# Patient Record
Sex: MALE | Race: WHITE | Employment: OTHER | URBAN - METROPOLITAN AREA
[De-identification: names, ages, dates, MRNs, and addresses within clinical notes are randomized per-mention and may not be internally consistent; named-entity substitution may affect disease eponyms.]

---

## 2017-04-25 ENCOUNTER — OFFICE VISIT (OUTPATIENT)
Dept: FAMILY MEDICINE CLINIC | Age: 62
End: 2017-04-25

## 2017-04-25 VITALS
TEMPERATURE: 97 F | WEIGHT: 214 LBS | SYSTOLIC BLOOD PRESSURE: 110 MMHG | DIASTOLIC BLOOD PRESSURE: 80 MMHG | BODY MASS INDEX: 30.71 KG/M2

## 2017-04-25 DIAGNOSIS — J06.9 UPPER RESPIRATORY TRACT INFECTION, UNSPECIFIED TYPE: Primary | ICD-10-CM

## 2017-04-25 PROCEDURE — 99213 OFFICE O/P EST LOW 20 MIN: CPT | Performed by: PHYSICIAN ASSISTANT

## 2017-04-25 RX ORDER — PREDNISONE 20 MG/1
60 TABLET ORAL DAILY
Qty: 15 TABLET | Refills: 0 | Status: SHIPPED | OUTPATIENT
Start: 2017-04-25 | End: 2017-04-30

## 2017-04-25 RX ORDER — CEPHALEXIN 500 MG/1
500 CAPSULE ORAL 3 TIMES DAILY
Qty: 30 CAPSULE | Refills: 0 | Status: SHIPPED | OUTPATIENT
Start: 2017-04-25 | End: 2017-07-07 | Stop reason: ALTCHOICE

## 2017-04-25 RX ORDER — ALBUTEROL SULFATE 90 UG/1
2 AEROSOL, METERED RESPIRATORY (INHALATION) EVERY 6 HOURS PRN
Qty: 1 INHALER | Refills: 5 | Status: SHIPPED | OUTPATIENT
Start: 2017-04-25 | End: 2020-04-23 | Stop reason: SDUPTHER

## 2017-04-25 ASSESSMENT — ENCOUNTER SYMPTOMS
TROUBLE SWALLOWING: 0
NAUSEA: 0
SORE THROAT: 0
COUGH: 1
EYE PAIN: 0
RHINORRHEA: 1
SINUS PRESSURE: 1
WHEEZING: 0
VOMITING: 0
SHORTNESS OF BREATH: 1
DIARRHEA: 0

## 2017-07-07 PROBLEM — Z00.00 ANNUAL PHYSICAL EXAM: Status: ACTIVE | Noted: 2017-07-07

## 2017-07-11 ENCOUNTER — TELEPHONE (OUTPATIENT)
Dept: OTHER | Facility: CLINIC | Age: 62
End: 2017-07-11

## 2017-10-13 ENCOUNTER — TELEPHONE (OUTPATIENT)
Dept: FAMILY MEDICINE CLINIC | Age: 62
End: 2017-10-13

## 2017-10-13 NOTE — TELEPHONE ENCOUNTER
Patient would like a lab order for his potassium level to be checked and what other labs need to be done    Please call them at [phone] when it's ready to be picked up.

## 2017-10-24 ENCOUNTER — TELEPHONE (OUTPATIENT)
Dept: FAMILY MEDICINE CLINIC | Age: 62
End: 2017-10-24

## 2017-10-24 DIAGNOSIS — I10 ESSENTIAL HYPERTENSION: Primary | ICD-10-CM

## 2017-10-24 NOTE — TELEPHONE ENCOUNTER
Pt's spouse states pt went to [facility] for his back and they ran blood test and EKG and chest x-rays and everything should be one Care Everywhere; pt's spouse states she would like to know the information; stated the creatin was a little high. Does pt need to do another blood work now or wait? Currently registered to do blood work on Friday 10/27/17. Please call and advise.

## 2017-10-26 ENCOUNTER — TELEPHONE (OUTPATIENT)
Dept: FAMILY MEDICINE CLINIC | Age: 62
End: 2017-10-26

## 2017-10-26 DIAGNOSIS — N28.9 ABNORMAL KIDNEY FUNCTION: Primary | ICD-10-CM

## 2017-11-06 ENCOUNTER — TELEPHONE (OUTPATIENT)
Dept: FAMILY MEDICINE CLINIC | Age: 62
End: 2017-11-06

## 2017-11-07 ENCOUNTER — OFFICE VISIT (OUTPATIENT)
Dept: FAMILY MEDICINE CLINIC | Age: 62
End: 2017-11-07

## 2017-11-07 VITALS
HEART RATE: 63 BPM | BODY MASS INDEX: 29.92 KG/M2 | TEMPERATURE: 97.3 F | WEIGHT: 210 LBS | DIASTOLIC BLOOD PRESSURE: 90 MMHG | SYSTOLIC BLOOD PRESSURE: 130 MMHG | OXYGEN SATURATION: 99 %

## 2017-11-07 DIAGNOSIS — I10 ESSENTIAL HYPERTENSION: ICD-10-CM

## 2017-11-07 DIAGNOSIS — M54.6 ACUTE LEFT-SIDED THORACIC BACK PAIN: Primary | ICD-10-CM

## 2017-11-07 PROCEDURE — 99214 OFFICE O/P EST MOD 30 MIN: CPT | Performed by: FAMILY MEDICINE

## 2017-11-07 RX ORDER — LOVASTATIN 10 MG/1
10 TABLET ORAL NIGHTLY
COMMUNITY
End: 2017-12-26 | Stop reason: SDUPTHER

## 2017-11-07 RX ORDER — CYCLOBENZAPRINE HCL 10 MG
10 TABLET ORAL 3 TIMES DAILY PRN
Qty: 30 TABLET | Refills: 1 | Status: SHIPPED | OUTPATIENT
Start: 2017-11-07 | End: 2017-11-17

## 2017-11-07 RX ORDER — PREDNISONE 20 MG/1
TABLET ORAL
Qty: 19 TABLET | Refills: 0 | Status: SHIPPED | OUTPATIENT
Start: 2017-11-07 | End: 2017-12-06 | Stop reason: HOSPADM

## 2017-11-07 RX ORDER — AMLODIPINE BESYLATE 5 MG/1
TABLET ORAL
Qty: 30 TABLET | Refills: 0 | Status: SHIPPED
Start: 2017-11-07 | End: 2017-12-26 | Stop reason: DRUGHIGH

## 2017-11-07 ASSESSMENT — ENCOUNTER SYMPTOMS
SHORTNESS OF BREATH: 0
BACK PAIN: 1
COUGH: 0

## 2017-11-07 NOTE — PATIENT INSTRUCTIONS
Patient Education        Back Stretches: Exercises  Your Care Instructions  Here are some examples of exercises for stretching your back. Start each exercise slowly. Ease off the exercise if you start to have pain. Your doctor or physical therapist will tell you when you can start these exercises and which ones will work best for you. How to do the exercises  Overhead stretch    1. Stand comfortably with your feet shoulder-width apart. 2. Looking straight ahead, raise both arms over your head and reach toward the ceiling. Do not allow your head to tilt back. 3. Hold for 15 to 30 seconds, then lower your arms to your sides. 4. Repeat 2 to 4 times. Side stretch    1. Stand comfortably with your feet shoulder-width apart. 2. Raise one arm over your head, and then lean to the other side. 3. Slide your hand down your leg as you let the weight of your arm gently stretch your side muscles. Hold for 15 to 30 seconds. 4. Repeat 2 to 4 times on each side. Press-up    1. Lie on your stomach, supporting your body with your forearms. 2. Press your elbows down into the floor to raise your upper back. As you do this, relax your stomach muscles and allow your back to arch without using your back muscles. As your press up, do not let your hips or pelvis come off the floor. 3. Hold for 15 to 30 seconds, then relax. 4. Repeat 2 to 4 times. Relax and rest    1. Lie on your back with a rolled towel under your neck and a pillow under your knees. Extend your arms comfortably to your sides. 2. Relax and breathe normally. 3. Remain in this position for about 10 minutes. 4. If you can, do this 2 or 3 times each day. Follow-up care is a key part of your treatment and safety. Be sure to make and go to all appointments, and call your doctor if you are having problems. It's also a good idea to know your test results and keep a list of the medicines you take. Where can you learn more?   Go to [email or web address] and sign in to your Screenleap account. Enter W189 in the [employer] International[employer] box to learn more about \"Back Stretches: Exercises. \"     If you do not have an account, please click on the \"Sign Up Now\" link. Current as of: March 21, 2017  Content Version: 11.3  © 8745-6652 [employer]. Care instructions adapted under license by [facility] ([facility]). If you have questions about a medical condition or this instruction, always ask your healthcare professional. [address] any warranty or liability for your use of this information. Patient Education        Rhomboid Muscle Strain: Rehab Exercises  Your Care Instructions  Here are some examples of typical rehabilitation exercises for your condition. Start each exercise slowly. Ease off the exercise if you start to have pain. Your doctor or physical therapist will tell you when you can start these exercises and which ones will work best for you. How to do the exercises  Lower neck and upper back (rhomboid) stretch    5. Stretch your arms out in front of your body. Clasp one hand on top of your other hand. 6. Gently reach out so that you feel your shoulder blades stretching away from each other. 7. Gently bend your head forward. 8. Hold for 15 to 30 seconds. 9. Repeat 2 to 4 times. Resisted rows    Note: For this exercise, you will need elastic exercise material, such as surgical tubing or Thera-Band. 5. Put the band around a solid object, such as a bedpost, at about waist level. Stand facing where you have placed the band. Hold equal lengths of the band in each hand. 6. Start with your arms held out in front of you. 7. Pull the bands back, and move your shoulder blades together. As you finish, your elbows should be at your side and bent at 90 degrees (like the angle of the letter \"L\"). 8. Return to the starting position. 9. Repeat 8 to 12 times. Neck stretches    5.  Look straight ahead, and tip your right ear to your right shoulder. Do not let your left shoulder rise as you tip your head to the right. 6. Hold for 15 to 30 seconds. 7. Tilt your head to the left. Do not let your right shoulder rise as you tip your head to the left. 8. Hold for 15 to 30 seconds. 9. Repeat 2 to 4 times to each side. Neck rotation    5. Sit in a firm chair, or stand up straight. 6. Keeping your chin level, turn your head to the right, and hold for 15 to 30 seconds. 7. Turn your head to the left, and hold for 15 to 30 seconds. 8. Repeat 2 to 4 times to each side. Follow-up care is a key part of your treatment and safety. Be sure to make and go to all appointments, and call your doctor if you are having problems. It's also a good idea to know your test results and keep a list of the medicines you take. Where can you learn more? Go to https://[email or web address] [email or web address] [email or web address]. org and sign in to your G[employer]ight account. Enter G071 in the ensembli box to learn more about \"Rhomboid Muscle Strain: Rehab Exercises. \"     If you do not have an account, please click on the \"Sign Up Now\" link. Current as of: March 21, 2017  Content Version: 11.3  © 7510-3103 [employer]. Care instructions adapted under license by [facility] ([facility]). If you have questions about a medical condition or this instruction, always ask your healthcare professional. [place] [address] any warranty or liability for your use of this information.

## 2017-11-13 ENCOUNTER — OFFICE VISIT (OUTPATIENT)
Dept: ORTHOPEDIC SURGERY | Age: 62
End: 2017-11-13

## 2017-11-13 VITALS — BODY MASS INDEX: 30.08 KG/M2 | WEIGHT: 210.1 LBS | HEIGHT: 70 IN

## 2017-11-13 DIAGNOSIS — M47.814 SPONDYLOSIS OF THORACIC REGION WITHOUT MYELOPATHY OR RADICULOPATHY: Primary | ICD-10-CM

## 2017-11-13 DIAGNOSIS — M48.10 DISH (DIFFUSE IDIOPATHIC SKELETAL HYPEROSTOSIS): ICD-10-CM

## 2017-11-13 DIAGNOSIS — M54.6 ACUTE BILATERAL THORACIC BACK PAIN: ICD-10-CM

## 2017-11-13 PROCEDURE — 72070 X-RAY EXAM THORAC SPINE 2VWS: CPT | Performed by: PHYSICAL MEDICINE & REHABILITATION

## 2017-11-13 PROCEDURE — 99243 OFF/OP CNSLTJ NEW/EST LOW 30: CPT | Performed by: PHYSICAL MEDICINE & REHABILITATION

## 2017-11-13 NOTE — LETTER
Please schedule the following with:     Date:       Account: [de-identified]  Patient: Gabi Smith    : 1955  Address:  [place] [address]    Phone (H):  [phone] (home) [phone] (work)     ----------------------------------------------------------------------------------------------  Diagnosis:     ICD-10-CM ICD-9-CM    1. Spondylosis of thoracic region without myelopathy or radiculopathy M47.814 721.2    2. DISH (diffuse idiopathic skeletal hyperostosis) M48.10 721.6    3. Acute bilateral thoracic back pain M54.6 724.1 XR THORACIC SPINE (2 VIEWS)         Levels: T6/T7, T7/T8  on the left  L-Facet    ----------------------------------------------------------------------------------------------  Injection #   [id number] [facility]    Attending Physician       Saad Valenzuela.  Kirill Trivedi MD.      ----------------------------------------------------------------------------------------------  Injection Scheduled For:    At:    1st Insurance:     Pre-Cert#    2nd Insurance:    Pre-Cert#    Comments:    · Infection control  · Tested positive for MRSA in past 12 months:  no  · Tested positive for MSSA \"staph infection\" in past 12 months: no  · Tested positive for VRE (Vancomycin Resistant Enterococci) in past 12 months:   no  · Currently on any antibiotics for an infection: no  · Anticoagulants:  · On a blood thinner:  no   · Any history of bleeding disorder: no   · Advanced Liver disease: no   · Advanced Renal disease: no   · Glaucoma: no   · Diabetes: no     Sedation:  Yes  -----------------------------------------------------------------------------------------------  Allergies   Allergen Reactions    Diovan [Valsartan]     Ezetimibe-Simvastatin     Biaxin [Clarithromycin] Rash     itching

## 2017-11-16 ENCOUNTER — TELEPHONE (OUTPATIENT)
Dept: ORTHOPEDIC SURGERY | Age: 62
End: 2017-11-16

## 2017-11-16 NOTE — TELEPHONE ENCOUNTER
CPT: 87496, [address] [facility] DX: [id number] [id number]   Outpatient  SX Location: [facility] Procedure:  Facet  Auth: NPR  Date: 12-6-17  Insurance: [address]  Physician: PRIYA

## 2017-12-06 ENCOUNTER — HOSPITAL ENCOUNTER (OUTPATIENT)
Dept: PAIN MANAGEMENT | Age: 62
Discharge: HOME | End: 2017-12-06
Attending: PHYSICAL MEDICINE & REHABILITATION | Admitting: PHYSICAL MEDICINE & REHABILITATION

## 2017-12-06 VITALS
HEART RATE: 66 BPM | RESPIRATION RATE: 16 BRPM | WEIGHT: 210 LBS | DIASTOLIC BLOOD PRESSURE: 84 MMHG | BODY MASS INDEX: 30.06 KG/M2 | OXYGEN SATURATION: 100 % | HEIGHT: 70 IN | TEMPERATURE: 97.2 F | SYSTOLIC BLOOD PRESSURE: 190 MMHG

## 2017-12-06 ASSESSMENT — PAIN - FUNCTIONAL ASSESSMENT
PAIN_FUNCTIONAL_ASSESSMENT: 0-10
PAIN_FUNCTIONAL_ASSESSMENT: 0-10

## 2017-12-06 ASSESSMENT — PAIN DESCRIPTION - DESCRIPTORS: DESCRIPTORS: ACHING

## 2017-12-06 NOTE — PROGRESS NOTES
IV discontinued, catheter intact, and dressing applied. Procedural dressing dry and intact. Bilateral lower extremities equal in strength. Discharge instructions reviewed with patient or responsible adult, signed and copy given. All home medications have been reviewed. All questions answered and patient or responsible adult verbalized understanding.

## 2017-12-06 NOTE — PROGRESS NOTES
Dr. Sonia Lares intra-procedure note:        Sight marked/confirmed with x-ray: yes  Position: prone  Prepped with: chloraprep    Local : lidocaine 1%       Betamethasone:  6 mg /ml :2.5 ml  Saline (ml) 4ml      Monitored by Chioma Delcid RN  C-arm : Ismael AGUIRRE  Ciruculator: Sang Galvan RN  Prepped by: Dr. Radha Tong MD

## 2017-12-26 PROBLEM — R39.11 URINARY HESITANCY DUE TO BENIGN PROSTATIC HYPERPLASIA: Status: ACTIVE | Noted: 2017-12-26

## 2017-12-26 PROBLEM — N40.1 URINARY HESITANCY DUE TO BENIGN PROSTATIC HYPERPLASIA: Status: ACTIVE | Noted: 2017-12-26

## 2018-01-03 ENCOUNTER — TELEPHONE (OUTPATIENT)
Dept: FAMILY MEDICINE CLINIC | Age: 63
End: 2018-01-03

## 2018-01-03 RX ORDER — AMOXICILLIN 500 MG/1
1 TABLET, FILM COATED ORAL 3 TIMES DAILY
Qty: 30 TABLET | Refills: 0 | Status: SHIPPED | OUTPATIENT
Start: 2018-01-03 | End: 2018-01-13

## 2018-01-17 ENCOUNTER — OFFICE VISIT (OUTPATIENT)
Dept: ORTHOPEDIC SURGERY | Age: 63
End: 2018-01-17

## 2018-01-17 DIAGNOSIS — M25.561 RIGHT KNEE PAIN, UNSPECIFIED CHRONICITY: ICD-10-CM

## 2018-01-17 DIAGNOSIS — M21.161 ACQUIRED GENU VARUM OF RIGHT LOWER EXTREMITY: ICD-10-CM

## 2018-01-17 DIAGNOSIS — M17.11 PRIMARY OSTEOARTHRITIS OF RIGHT KNEE: Primary | ICD-10-CM

## 2018-01-17 PROCEDURE — 73562 X-RAY EXAM OF KNEE 3: CPT | Performed by: INTERNAL MEDICINE

## 2018-01-17 PROCEDURE — 99214 OFFICE O/P EST MOD 30 MIN: CPT | Performed by: INTERNAL MEDICINE

## 2018-01-17 RX ORDER — TRAMADOL HYDROCHLORIDE 50 MG/1
50 TABLET ORAL EVERY 6 HOURS PRN
Qty: 28 TABLET | Refills: 0 | Status: SHIPPED | OUTPATIENT
Start: 2018-01-17 | End: 2018-01-24

## 2018-01-24 ENCOUNTER — OFFICE VISIT (OUTPATIENT)
Dept: ORTHOPEDIC SURGERY | Age: 63
End: 2018-01-24

## 2018-01-24 DIAGNOSIS — M17.11 OSTEOARTHRITIS OF RIGHT KNEE, UNSPECIFIED OSTEOARTHRITIS TYPE: ICD-10-CM

## 2018-01-24 DIAGNOSIS — M21.161 ACQUIRED GENU VARUM OF RIGHT LOWER EXTREMITY: ICD-10-CM

## 2018-01-24 DIAGNOSIS — M17.11 PRIMARY OSTEOARTHRITIS OF RIGHT KNEE: Primary | ICD-10-CM

## 2018-01-24 PROCEDURE — 99213 OFFICE O/P EST LOW 20 MIN: CPT | Performed by: INTERNAL MEDICINE

## 2018-01-24 PROCEDURE — 20611 DRAIN/INJ JOINT/BURSA W/US: CPT | Performed by: INTERNAL MEDICINE

## 2018-01-24 NOTE — LETTER
[facility]  [address] [address]  Phone: [phone]  Fax: [phone]    Gertrude Wade MD        January 24, 2018     Patient: Baron Oropeza   YOB: 1955   Date of Visit: 1/24/2018       To Whom It May Concern: It is my medical opinion that Arianna Ceballos Should avoid excessive walking activities much as possible to promote healing of his knee condition for the next 2 weeks especially. If you have any questions or concerns, please don't hesitate to call.     Sincerely,    Mago Méndez MD.      Gertrude Wade MD

## 2018-01-25 ENCOUNTER — TELEPHONE (OUTPATIENT)
Dept: ORTHOPEDIC SURGERY | Age: 63
End: 2018-01-25

## 2018-01-25 PROBLEM — M21.161 ACQUIRED GENU VARUM OF RIGHT LOWER EXTREMITY: Status: ACTIVE | Noted: 2018-01-25

## 2018-01-25 PROBLEM — M17.11 PRIMARY OSTEOARTHRITIS OF RIGHT KNEE: Status: ACTIVE | Noted: 2018-01-25

## 2018-01-25 NOTE — TELEPHONE ENCOUNTER
LM that I did submit the brace for precert and will call him back once I have that info. Told him to give me a call if he had any questions in the mean time.

## 2018-01-25 NOTE — PROGRESS NOTES
1/24/2019       Logic E Ultrasound / linear transducer 10 HZ    The patient was placed supine on the examination table with the knees supported. The  RT      Lower extremity was slightly abducted . The ultrasound was placed on knee preset function and the linear transducer was placed transversely  over the medial patellofemoral joint and the medial patella femoral  joint recess was identified. The skin was prepped in sterile fashion. Sterile ultrasound gel  and topical anesthetic were utilized. Using axial technique, a 25 GA 40 mm needle was advanced under direct guidance into the medial patellofemoral joint recess and 4ml of 0.5% marcaine and 2 ml of celestone was injected . The joint space was visualized distending with Injectate. There was no resistance to the Injectate. Patient tolerated this with minimal to no discomfort. Band-Aid applied to puncture wound. Technically successful ultrasound guided injection. The media patellafemoral joint recess was visualized in short axis. No evidence of effusion, soft tissue mass or cystic lesions. Other Outside Imaging and Testing Personally Reviewed:       none          Assessment   Impression: . Encounter Diagnoses   Name Primary?     Primary osteoarthritis of right knee Yes    Acquired genu varum of right lower extremity     Osteoarthritis of right knee, unspecified osteoarthritis type               Plan:     Postinjection protocol  Activity modification-formal restrictions in the workplace to avoid excess walking for the next 2 weeks especially  OA  brace  Consider him a candidate for biologic orthopedic injection-type III PRP injection and/or  BMAC  Continue NSAIDs for now which a precaution  Quad isometrics and knee range of motion and consider formal course of PT eventually     Orders:        Orders Placed This Encounter   Procedures    US Guided Needle Placement    WA BETAMETHASONE ACET&SOD PHOSP    WA ARTHROCENTESIS ASPIR&/INJ

## 2018-01-29 ENCOUNTER — TELEPHONE (OUTPATIENT)
Dept: ORTHOPEDIC SURGERY | Age: 63
End: 2018-01-29

## 2018-01-30 NOTE — TELEPHONE ENCOUNTER
LM to confirm appt for measuring today at 4:30 at [facility]

## 2018-02-22 ENCOUNTER — TELEPHONE (OUTPATIENT)
Dept: ORTHOPEDIC SURGERY | Age: 63
End: 2018-02-22

## 2018-02-26 ENCOUNTER — TELEPHONE (OUTPATIENT)
Dept: ORTHOPEDIC SURGERY | Age: 63
End: 2018-02-26

## 2018-02-28 DIAGNOSIS — M17.11 OSTEOARTHRITIS OF RIGHT KNEE, UNSPECIFIED OSTEOARTHRITIS TYPE: ICD-10-CM

## 2018-02-28 PROCEDURE — L1845 KO DOUBLE UPRIGHT PRE CST: HCPCS | Performed by: INTERNAL MEDICINE

## 2018-03-16 ENCOUNTER — TELEPHONE (OUTPATIENT)
Dept: ORTHOPEDIC SURGERY | Age: 63
End: 2018-03-16

## 2018-04-02 NOTE — PROGRESS NOTES
Results for the following test have been finalized and entered into the patients chart
effusion      Palpation:  Tenderness over the medial femoral condyle greater than tibial plateau and MJL      Range of Motion:  0/120 subjective tightness and pain and range flexion greater than extension      Strength:  Normal with SLR      Special Tests:  Grade 1 pseudo-varus instability, Lachman test negative knee stable to valgus stressing, anterior drawer posterior drawer negative medial Severo's  For pain, lateral Severo's negative      Skin: There are no rashes, ulcerations or lesions. Gait: mildly antalgic with mild varus thrust      Reflex intact lower     Additional Comments:        Additional Examinations:           Left Lower Extremity: Examination of the left lower extremity does not show any tenderness, deformity or injury. Range of motion is unremarkable. There is no gross instability. There are no rashes, ulcerations or lesions. Strength and tone are normal.   Neurolgic -Light touch sensation and manual muscle testing normal L2-S1. No fasiculations. Pattella tendon and Achilles tendon reflexes +2 bilaterally.   Seated SLR negative          Office Imaging Results/Procedures PerformedToday:      Radiology:      X-rays obtained and reviewed in office:   Views 3 views right knee comparison merchant/lateral left knee   Location right knee   Impression there is suggestive of medial compartment space narrowing affecting the right knee consistent with grade 3 osteoarthritis grade 1 degenerative change affecting the lateral and anterior compartments on the right only grade 1 degenerative change tricompartmental affecting the contralateral left knee traction spurring from the superior pole of patella       Office Procedures:     Orders Placed This Encounter   Procedures    XR KNEE RIGHT (3 VIEWS)     [id number]     Order Specific Question:   Reason for exam:     Answer:   Pain    XR KNEE LEFT (1-2 VIEWS)     [id number]     Order Specific Question:   Reason for exam:     Answer:   Pain           Other

## 2018-07-03 ENCOUNTER — OFFICE VISIT (OUTPATIENT)
Dept: ORTHOPEDIC SURGERY | Age: 63
End: 2018-07-03

## 2018-07-03 VITALS
DIASTOLIC BLOOD PRESSURE: 74 MMHG | BODY MASS INDEX: 30.78 KG/M2 | HEART RATE: 71 BPM | SYSTOLIC BLOOD PRESSURE: 166 MMHG | HEIGHT: 70 IN | WEIGHT: 215 LBS

## 2018-07-03 DIAGNOSIS — M25.521 RIGHT ELBOW PAIN: ICD-10-CM

## 2018-07-03 DIAGNOSIS — M77.11 LATERAL EPICONDYLITIS OF RIGHT ELBOW: Primary | ICD-10-CM

## 2018-07-03 PROCEDURE — 99213 OFFICE O/P EST LOW 20 MIN: CPT | Performed by: ORTHOPAEDIC SURGERY

## 2018-07-03 NOTE — PROGRESS NOTES
Chief Complaint   Patient presents with    Pain     right elbow         HISTORY OF PRESENT ILLNESS:  Umang Dumont is a  right-hand-dominant patient, self-employed  here for a pain over his right lateral elbow that began approximately 3 weeks ago. Symptoms have been intermittent. The pain is aggravated by grasping and lifting and relieved by rest. He has noted no out of proportion swelling, weakness, erythema, or other signs of inflammation. Symptoms are worsening over time. Previous treatment: Topical ointment, lateral forearm offloading strap with minimal benefit. He also has a history of frequent oral anti-inflammatory use but recently stopped several months ago with concern for gastric upset. No history of cervical spine pathology, no gout or rheumatoid arthritis history    He denies any specific recent injury or event leading to the onset of symptoms. No numbness or tingling or other symptoms in his right upper extremity including hand. He does have some occasional irritation he reports on the medial part of his elbow and tingling in his medial elbow. he does have a significant medical history of right forearm fracture with multiple surgeries including operative stabilization and subsequent hardware removal. He has had forearm contracture and elbow stiffness subsequently, injury was approximately 30 years ago  Medical History:  Patient's medications, allergies, past medical, surgical, social and family histories were reviewed and updated as appropriate. ROS:  Pertinent items are noted in HPI  Review of systems reviewed from Patient History Form dated on 7/3/18 and available in the patient's chart under the Media tab. PHYSICAL EXAMINATION:    Gen/Psych: Examination reveals a pleasant individual in no acute distress. The patient is oriented to time, place and person. The patient's mood and affect are appropriate. Lymph:  The lymphatic examination bilaterally reveals all areas

## 2018-09-26 PROBLEM — Z00.00 ANNUAL PHYSICAL EXAM: Status: RESOLVED | Noted: 2017-07-07 | Resolved: 2018-09-26

## 2018-12-17 ENCOUNTER — TELEPHONE (OUTPATIENT)
Dept: FAMILY MEDICINE CLINIC | Age: 63
End: 2018-12-17

## 2018-12-17 RX ORDER — LEVOTHYROXINE SODIUM 0.05 MG/1
TABLET ORAL
Qty: 90 TABLET | Refills: 0 | Status: SHIPPED | OUTPATIENT
Start: 2018-12-17 | End: 2019-01-25 | Stop reason: SDUPTHER

## 2018-12-17 RX ORDER — AMLODIPINE BESYLATE 10 MG/1
TABLET ORAL
Qty: 90 TABLET | Refills: 0 | Status: SHIPPED | OUTPATIENT
Start: 2018-12-17 | End: 2019-01-25 | Stop reason: SDUPTHER

## 2018-12-17 RX ORDER — LOVASTATIN 10 MG/1
TABLET ORAL
Qty: 90 TABLET | Refills: 0 | Status: SHIPPED | OUTPATIENT
Start: 2018-12-17 | End: 2019-01-25 | Stop reason: SDUPTHER

## 2018-12-17 NOTE — TELEPHONE ENCOUNTER
Pt is requesting refill on meds amlodipine, lovastatin, and  Levothyroxine. Pt stated these meds are [facility] filled [facility] and not [facility].  Please Advise

## 2018-12-17 NOTE — TELEPHONE ENCOUNTER
Yakelin Narvaez for this.  Pt was las seen on 12/17by Carmel Melendez and has appt on 01/25/18 with

## 2019-01-25 ENCOUNTER — OFFICE VISIT (OUTPATIENT)
Dept: FAMILY MEDICINE CLINIC | Age: 64
End: 2019-01-25
Payer: COMMERCIAL

## 2019-01-25 VITALS
DIASTOLIC BLOOD PRESSURE: 70 MMHG | SYSTOLIC BLOOD PRESSURE: 140 MMHG | RESPIRATION RATE: 16 BRPM | BODY MASS INDEX: 30.85 KG/M2 | HEART RATE: 68 BPM | OXYGEN SATURATION: 96 % | WEIGHT: 215 LBS

## 2019-01-25 DIAGNOSIS — E78.2 MIXED HYPERLIPIDEMIA: ICD-10-CM

## 2019-01-25 DIAGNOSIS — I10 ESSENTIAL HYPERTENSION: ICD-10-CM

## 2019-01-25 DIAGNOSIS — J20.9 ACUTE BRONCHITIS, UNSPECIFIED ORGANISM: ICD-10-CM

## 2019-01-25 DIAGNOSIS — M15.9 GENERALIZED OSTEOARTHROSIS, INVOLVING MULTIPLE SITES: ICD-10-CM

## 2019-01-25 DIAGNOSIS — J43.9 PULMONARY EMPHYSEMA, UNSPECIFIED EMPHYSEMA TYPE (HCC): ICD-10-CM

## 2019-01-25 DIAGNOSIS — Z00.00 WELL ADULT EXAM: Primary | ICD-10-CM

## 2019-01-25 PROBLEM — M21.161 ACQUIRED GENU VARUM OF RIGHT LOWER EXTREMITY: Status: RESOLVED | Noted: 2018-01-25 | Resolved: 2019-01-25

## 2019-01-25 PROBLEM — M77.11 LATERAL EPICONDYLITIS OF RIGHT ELBOW: Status: RESOLVED | Noted: 2018-07-03 | Resolved: 2019-01-25

## 2019-01-25 PROCEDURE — 99396 PREV VISIT EST AGE 40-64: CPT | Performed by: FAMILY MEDICINE

## 2019-01-25 RX ORDER — LEVOTHYROXINE SODIUM 0.05 MG/1
TABLET ORAL
Qty: 90 TABLET | Refills: 3 | Status: SHIPPED | OUTPATIENT
Start: 2019-01-25 | End: 2020-03-12 | Stop reason: SDUPTHER

## 2019-01-25 RX ORDER — PREDNISONE 20 MG/1
TABLET ORAL
Qty: 15 TABLET | Refills: 0 | Status: SHIPPED | OUTPATIENT
Start: 2019-01-25 | End: 2020-06-22

## 2019-01-25 RX ORDER — AMLODIPINE BESYLATE 10 MG/1
TABLET ORAL
Qty: 90 TABLET | Refills: 3 | Status: SHIPPED | OUTPATIENT
Start: 2019-01-25 | End: 2020-03-12 | Stop reason: SDUPTHER

## 2019-01-25 RX ORDER — LOVASTATIN 10 MG/1
TABLET ORAL
Qty: 90 TABLET | Refills: 3 | Status: SHIPPED | OUTPATIENT
Start: 2019-01-25 | End: 2020-03-12 | Stop reason: SDUPTHER

## 2019-01-25 RX ORDER — CEPHALEXIN 500 MG/1
500 CAPSULE ORAL 3 TIMES DAILY
Qty: 21 CAPSULE | Refills: 0 | Status: SHIPPED | OUTPATIENT
Start: 2019-01-25 | End: 2019-02-01

## 2019-01-25 RX ORDER — DICLOFENAC SODIUM 75 MG/1
TABLET, DELAYED RELEASE ORAL
Qty: 180 TABLET | Refills: 3 | Status: SHIPPED | OUTPATIENT
Start: 2019-01-25 | End: 2020-01-08

## 2019-01-25 ASSESSMENT — PATIENT HEALTH QUESTIONNAIRE - PHQ9
2. FEELING DOWN, DEPRESSED OR HOPELESS: 0
SUM OF ALL RESPONSES TO PHQ QUESTIONS 1-9: 0
SUM OF ALL RESPONSES TO PHQ9 QUESTIONS 1 & 2: 0
SUM OF ALL RESPONSES TO PHQ QUESTIONS 1-9: 0
1. LITTLE INTEREST OR PLEASURE IN DOING THINGS: 0
1. LITTLE INTEREST OR PLEASURE IN DOING THINGS: 0
SUM OF ALL RESPONSES TO PHQ QUESTIONS 1-9: 0
SUM OF ALL RESPONSES TO PHQ QUESTIONS 1-9: 0

## 2019-04-26 RX ORDER — FLUTICASONE PROPIONATE 0.05 %
CREAM (GRAM) TOPICAL
Qty: 60 G | Refills: 3 | Status: SHIPPED | OUTPATIENT
Start: 2019-04-26 | End: 2022-10-17 | Stop reason: SDUPTHER

## 2020-01-08 RX ORDER — DICLOFENAC SODIUM 75 MG/1
TABLET, DELAYED RELEASE ORAL
Qty: 180 TABLET | Refills: 0 | Status: SHIPPED | OUTPATIENT
Start: 2020-01-08 | End: 2020-04-08

## 2020-03-12 RX ORDER — AMLODIPINE BESYLATE 10 MG/1
TABLET ORAL
Qty: 90 TABLET | Refills: 0 | Status: SHIPPED | OUTPATIENT
Start: 2020-03-12 | End: 2020-04-23 | Stop reason: SDUPTHER

## 2020-03-12 RX ORDER — LEVOTHYROXINE SODIUM 0.05 MG/1
TABLET ORAL
Qty: 90 TABLET | Refills: 0 | Status: SHIPPED | OUTPATIENT
Start: 2020-03-12 | End: 2020-04-23 | Stop reason: SDUPTHER

## 2020-03-12 RX ORDER — LOVASTATIN 10 MG/1
TABLET ORAL
Qty: 90 TABLET | Refills: 0 | Status: SHIPPED | OUTPATIENT
Start: 2020-03-12 | End: 2020-04-23 | Stop reason: SDUPTHER

## 2020-03-12 NOTE — TELEPHONE ENCOUNTER
Disp Refills Start End    levothyroxine (SYNTHROID) 50 MCG tablet 90 tablet 3 1/25/2019     Sig: TAKE 1 TABLET DAILY         Disp Refills Start End    amLODIPine (NORVASC) 10 MG tablet 90 tablet 3 1/25/2019     Sig: TAKE 1 TABLET DAILY       Disp Refills Start End    lovastatin (MEVACOR) 10 MG tablet 90 tablet 3 1/25/2019     Sig: TAKE 1 TABLET NIGHTLY      Pharmacy:  [facility] , [address] [phone] [facility] [phone]      Patient has appt: 4/27/2020 for annual physical      Please advise

## 2020-04-08 RX ORDER — DICLOFENAC SODIUM 75 MG/1
TABLET, DELAYED RELEASE ORAL
Qty: 180 TABLET | Refills: 3 | Status: SHIPPED | OUTPATIENT
Start: 2020-04-08 | End: 2020-06-22

## 2020-04-16 ENCOUNTER — TELEPHONE (OUTPATIENT)
Dept: FAMILY MEDICINE CLINIC | Age: 65
End: 2020-04-16

## 2020-04-16 NOTE — TELEPHONE ENCOUNTER
Ptcalling back from yesterday they do not need albuterol sent to [facility] right now. cx refill fornow.

## 2020-04-23 ENCOUNTER — TELEPHONE (OUTPATIENT)
Dept: FAMILY MEDICINE CLINIC | Age: 65
End: 2020-04-23

## 2020-04-23 RX ORDER — AMLODIPINE BESYLATE 10 MG/1
TABLET ORAL
Qty: 90 TABLET | Refills: 0 | Status: SHIPPED | OUTPATIENT
Start: 2020-04-23 | End: 2020-06-22

## 2020-04-23 RX ORDER — LOVASTATIN 10 MG/1
TABLET ORAL
Qty: 90 TABLET | Refills: 0 | Status: SHIPPED | OUTPATIENT
Start: 2020-04-23 | End: 2020-07-27 | Stop reason: SDUPTHER

## 2020-04-23 RX ORDER — LEVOTHYROXINE SODIUM 0.05 MG/1
TABLET ORAL
Qty: 90 TABLET | Refills: 0 | Status: SHIPPED | OUTPATIENT
Start: 2020-04-23 | End: 2020-07-27 | Stop reason: SDUPTHER

## 2020-04-23 RX ORDER — ALBUTEROL SULFATE 90 UG/1
2 AEROSOL, METERED RESPIRATORY (INHALATION) EVERY 6 HOURS PRN
Qty: 1 INHALER | Refills: 1 | Status: SHIPPED | OUTPATIENT
Start: 2020-04-23 | End: 2021-08-24 | Stop reason: SDUPTHER

## 2020-05-01 ENCOUNTER — TELEPHONE (OUTPATIENT)
Dept: FAMILY MEDICINE CLINIC | Age: 65
End: 2020-05-01

## 2020-05-30 ENCOUNTER — TELEPHONE (OUTPATIENT)
Dept: FAMILY MEDICINE CLINIC | Age: 65
End: 2020-05-30

## 2020-06-05 ENCOUNTER — TELEPHONE (OUTPATIENT)
Dept: FAMILY MEDICINE CLINIC | Age: 65
End: 2020-06-05

## 2020-06-16 ENCOUNTER — TELEPHONE (OUTPATIENT)
Dept: FAMILY MEDICINE CLINIC | Age: 65
End: 2020-06-16

## 2020-06-16 NOTE — TELEPHONE ENCOUNTER
Wife called to inform , patient has been hospitalized at [facility] due BP over 200 since 6/14 and should possibly be d/c today. Also, on 6/5 patient was seen by Mark Pulido and they did some blood work, she requests for someone to get the office notes from them to put in patient's chart. Mark Pulido phn # [phone]      Advised wife, patient's appt on 6/22 may be changed from physical to hospital f/u.       Please advise

## 2020-06-17 ENCOUNTER — TELEPHONE (OUTPATIENT)
Dept: FAMILY MEDICINE CLINIC | Age: 65
End: 2020-06-17

## 2020-06-22 ENCOUNTER — OFFICE VISIT (OUTPATIENT)
Dept: FAMILY MEDICINE CLINIC | Age: 65
End: 2020-06-22
Payer: COMMERCIAL

## 2020-06-22 VITALS
TEMPERATURE: 98.8 F | SYSTOLIC BLOOD PRESSURE: 140 MMHG | OXYGEN SATURATION: 98 % | BODY MASS INDEX: 29.63 KG/M2 | DIASTOLIC BLOOD PRESSURE: 70 MMHG | RESPIRATION RATE: 16 BRPM | HEART RATE: 73 BPM | WEIGHT: 206.5 LBS

## 2020-06-22 PROCEDURE — 99214 OFFICE O/P EST MOD 30 MIN: CPT | Performed by: FAMILY MEDICINE

## 2020-06-22 RX ORDER — ASPIRIN 81 MG/1
81 TABLET ORAL DAILY
COMMUNITY
End: 2020-07-27

## 2020-06-22 RX ORDER — HYDROCHLOROTHIAZIDE 25 MG/1
25 TABLET ORAL DAILY
COMMUNITY
Start: 2020-06-17 | End: 2020-07-17

## 2020-06-22 RX ORDER — NIFEDIPINE 30 MG/1
TABLET, FILM COATED, EXTENDED RELEASE ORAL
COMMUNITY
Start: 2020-06-16 | End: 2020-06-23 | Stop reason: ALTCHOICE

## 2020-06-22 RX ORDER — SPIRONOLACTONE 25 MG/1
25 TABLET ORAL DAILY
COMMUNITY
Start: 2020-06-17 | End: 2020-06-23 | Stop reason: ALTCHOICE

## 2020-06-22 SDOH — HEALTH STABILITY: MENTAL HEALTH: HOW OFTEN DO YOU HAVE A DRINK CONTAINING ALCOHOL?: MONTHLY OR LESS

## 2020-06-22 NOTE — PROGRESS NOTES
with Dana Herman MD   Medication Sig Dispense Refill    hydroCHLOROthiazide (HYDRODIURIL) 25 MG tablet Take 25 mg by mouth daily      NIFEdipine (ADALAT CC) 30 MG extended release tablet       spironolactone (ALDACTONE) 25 MG tablet Take 25 mg by mouth daily      aspirin 81 MG EC tablet Take 81 mg by mouth daily      levothyroxine (SYNTHROID) 50 MCG tablet TAKE 1 TABLET DAILY 90 tablet 0    lovastatin (MEVACOR) 10 MG tablet TAKE 1 TABLET NIGHTLY 90 tablet 0    albuterol sulfate HFA (PROAIR HFA) 108 (90 Base) MCG/ACT inhaler Inhale 2 puffs into the lungs every 6 hours as needed for Wheezing 1 Inhaler 1    fluticasone (CUTIVATE) 0.05 % cream Apply topically 2 times daily as needed. 60 g 3       Allergies   Allergen Reactions    Diovan [Valsartan]     Ezetimibe-Simvastatin     Biaxin [Clarithromycin] Rash     itching       Social History     Tobacco Use    Smoking status: Former Smoker     Last attempt to quit:      Years since quittin.4    Smokeless tobacco: Never Used   Substance Use Topics    Alcohol use: Yes     Alcohol/week: 0.0 standard drinks     Comment: occasional alcohol use        BP (!) 140/70 (Site: Right Upper Arm, Position: Sitting, Cuff Size: Large Adult)   Pulse 73   Temp 98.8 °F (37.1 °C) (Tympanic)   Resp 16   Wt 206 lb 8 oz (93.7 kg)   SpO2 98%   BMI 29.63 kg/m²     Objective:   Physical Exam  Constitutional:       General: He is not in acute distress. Appearance: He is well-developed. Neck:      Thyroid: No thyroid mass or thyromegaly. Vascular: No carotid bruit. Cardiovascular:      Rate and Rhythm: Normal rate and regular rhythm. Pulses:           Dorsalis pedis pulses are 2+ on the right side and 2+ on the left side. Posterior tibial pulses are 2+ on the right side and 2+ on the left side. Heart sounds: Normal heart sounds. No murmur. No friction rub. No gallop.     Pulmonary:      Effort: Pulmonary effort is normal.      Breath sounds: Normal breath sounds. Musculoskeletal: Normal range of motion. General: No tenderness. Right lower leg: No edema. Left lower leg: No edema. Neurological:      Mental Status: He is alert. Sensory: Sensation is intact. Motor: Motor function is intact. Psychiatric:         Behavior: Behavior is cooperative. Assessment:      Tamiko Tracey was seen today for follow-up from hospital.    Diagnoses and all orders for this visit:    Essential hypertension    Pulmonary emphysema, unspecified emphysema type ([address]  -     Full PFT Study With Bronchodilator; Future    Hypothyroidism, unspecified type    Mixed hyperlipidemia            Plan:      Hospital information was reviewed with patient and wife  Agree with current blood pressure management although we did review that he was on a combination of lisinopril and amlodipine years ago and this was stopped as he developed some kind of cough problem. Instructed patient that he should be on a low-salt diet and avoid anti-inflammatory medications. For his osteoarthritis we discussed using Tylenol thousand milligrams up to 3 times a day  With the persistent wheezing he has not had a pulmonary function test done for over 10 years and this needs to be evaluated. RTC 1 month for CPE    Please note that this chart was generated using Dragon dictation software. Although every effort was made to ensure the accuracy of this automated transcription, some errors in transcription may have occurred.

## 2020-06-23 ENCOUNTER — TELEPHONE (OUTPATIENT)
Dept: FAMILY MEDICINE CLINIC | Age: 65
End: 2020-06-23

## 2020-06-23 RX ORDER — AMLODIPINE BESYLATE 10 MG/1
TABLET ORAL
Qty: 90 TABLET | Refills: 0
Start: 2020-06-23 | End: 2020-08-03 | Stop reason: SDUPTHER

## 2020-06-23 RX ORDER — LISINOPRIL 20 MG/1
20 TABLET ORAL DAILY
Qty: 30 TABLET | Refills: 2 | Status: SHIPPED | OUTPATIENT
Start: 2020-06-23 | End: 2020-07-27 | Stop reason: SDUPTHER

## 2020-06-23 NOTE — TELEPHONE ENCOUNTER
Wife states Patient forgot to mention to  some side effects he has been having with some of the medications that the hospital had changed him too. He has been having severe muscle cramps at night, heat palpitations and shakiness. She was wondering if maybe the patient should be placed back onto some medications that the hospital discontinued. They would like to speak with  regarding this.     Please advise

## 2020-06-23 NOTE — TELEPHONE ENCOUNTER
Patient thinks he is having some side effects with shaking and he had a lot more muscle cramps. He would like to restart amlodipine and discontinue nifedipine medication. Patient was advised that he could discontinue nifedipine and spironolactone when he starts the amlodipine back daily. Lisinopril was added on at 20 mg strength which he took years ago with good control with blood pressure.   Keep RTC appointment

## 2020-07-02 ENCOUNTER — TELEPHONE (OUTPATIENT)
Dept: FAMILY MEDICINE CLINIC | Age: 65
End: 2020-07-02

## 2020-07-02 NOTE — TELEPHONE ENCOUNTER
Patient wife is calling in regards to patient BP . His readings were running high and then Dr Hemanth Holland switched his medication . His readings are now registering about 103/57 but he is experiencing lots of dizziness . She is wondering if a dose adjustment needs to be made to his medication again . Please advise . I informed that his PCP was out of the office but she would like a call back from any provider today . Provider out of office .

## 2020-07-02 NOTE — TELEPHONE ENCOUNTER
Patient's wife states when lisinopril 20 mg was added is when the bp started dropping. She is wanting to know if they should lower this as well. She states he has been getting muscle cramps in his legs, lightheaded and dizzy. Got off of a ladder at work and didn't know if the heat is causing his BP to drop or the meds. Please advise.

## 2020-07-09 ENCOUNTER — OFFICE VISIT (OUTPATIENT)
Dept: PULMONOLOGY | Age: 65
End: 2020-07-09
Payer: COMMERCIAL

## 2020-07-09 ENCOUNTER — TELEPHONE (OUTPATIENT)
Dept: PULMONOLOGY | Age: 65
End: 2020-07-09

## 2020-07-09 VITALS
BODY MASS INDEX: 30.36 KG/M2 | HEIGHT: 69 IN | DIASTOLIC BLOOD PRESSURE: 84 MMHG | HEART RATE: 75 BPM | WEIGHT: 205 LBS | OXYGEN SATURATION: 98 % | RESPIRATION RATE: 18 BRPM | SYSTOLIC BLOOD PRESSURE: 138 MMHG

## 2020-07-09 PROCEDURE — 99204 OFFICE O/P NEW MOD 45 MIN: CPT | Performed by: INTERNAL MEDICINE

## 2020-07-09 ASSESSMENT — ENCOUNTER SYMPTOMS
SORE THROAT: 0
SINUS PRESSURE: 0
ABDOMINAL DISTENTION: 0
VOICE CHANGE: 0
BLOOD IN STOOL: 0
CHEST TIGHTNESS: 0
COUGH: 1
CHOKING: 0
WHEEZING: 0
ABDOMINAL PAIN: 0
ANAL BLEEDING: 0
DIARRHEA: 0
RHINORRHEA: 0
SHORTNESS OF BREATH: 1
STRIDOR: 0
BACK PAIN: 0
APNEA: 0
CONSTIPATION: 0

## 2020-07-09 NOTE — TELEPHONE ENCOUNTER
Pt's wife called back in stating that the Samuel Fuss is covered and only $35/month, the pharmacy tech ran the prescription wrong. Pt does not need another script.      Pt # [id number]

## 2020-07-09 NOTE — TELEPHONE ENCOUNTER
Pt's wife called in stating their pharmacy let them know that the Bevspi is not covered, would be over $200. Pt asking what else can be prescribed.      Pt # [id number]

## 2020-07-09 NOTE — PROGRESS NOTES
Geetha Welch    YOB: 1955     Date of Service:  7/9/2020     Chief Complaint   Patient presents with    New Patient     NPV - pt is a self referral for his SOB / Wheezing    Cough     dry cough    Shortness of Breath     for about 1 yr         HPI patient is here for an evaluation of COPD. Patient states that he was recently hospitalized at [facility] for hypertensive urgency, between 6/14 and 6/16. He has been worried about his shortness of breath-which has been persistent for quite a while, as long as 14 years and hence he underwent a complete PFT study on 7/6 at [facility], suggestive of COPD and hence is here for a pulmonary evaluation. Symptoms include dyspnea particularly with exertion-states that he felt short of breath all the time, but feels okay with exertion and steps. He states that due to COVID-19 pandemic he has been less active, since he is also been laid off. Cough with mucoid phlegm at times. Denies any chest pain. Denies any orthopnea, has some leg edema. Former heavy smoker-smoked up to 2-1/2 packs/day for approximately 33 years, quit 14 years ago. Worked as an  almost all his life. History of hypertension-recent hospitalization for urgency, currently on lisinopril, Norvasc and hydrochlorothiazide.     Allergies   Allergen Reactions    Diovan [Valsartan]     Ezetimibe-Simvastatin     Biaxin [Clarithromycin] Rash     itching     Outpatient Medications Marked as Taking for the 7/9/20 encounter (Office Visit) with Graciela Flood MD   Medication Sig Dispense Refill    glycopyrrolate-formoterol (BEVESPI) 9-4.8 MCG/ACT AERO Inhale 2 puffs into the lungs 2 times daily 1 Inhaler 3       Immunization History   Administered Date(s) Administered    Influenza Virus Vaccine 10/21/2010, 10/21/2011, 10/03/2012, 11/28/2014       Past Medical History:   Diagnosis Date    Anxiety state, unspecified     Chronic airway obstruction, not elsewhere classified     Generalized osteoarthrosis, involving multiple sites     Insomnia, unspecified     Other and unspecified hyperlipidemia     Rosacea     Thyroid disease     Unspecified essential hypertension      Past Surgical History:   Procedure Laterality Date    CLAVICLE EXCISION       Family History   Problem Relation Age of Onset    Emphysema Mother     Stroke Father 76    Kidney Disease Brother         renal cancer    High Blood Pressure Brother        Review of Systems:  Review of Systems   Constitutional: Positive for fatigue. Negative for activity change, appetite change and fever. HENT: Negative for congestion, ear discharge, ear pain, postnasal drip, rhinorrhea, sinus pressure, sneezing, sore throat, tinnitus and voice change. Respiratory: Positive for cough and shortness of breath. Negative for apnea, choking, chest tightness, wheezing and stridor. Cardiovascular: Positive for leg swelling. Negative for chest pain and palpitations. Gastrointestinal: Negative for abdominal distention, abdominal pain, anal bleeding, blood in stool, constipation and diarrhea. Musculoskeletal: Negative for arthralgias, back pain and gait problem. Skin: Negative for pallor and rash. Allergic/Immunologic: Negative for environmental allergies. Neurological: Negative for dizziness, tremors, seizures, syncope, speech difficulty, weakness, light-headedness, numbness and headaches. Hematological: Negative for adenopathy. Does not bruise/bleed easily. Psychiatric/Behavioral: Negative for sleep disturbance. Vitals:    07/09/20 1415   BP: 138/84   Pulse: 75   Resp: 18   SpO2: 98%   Weight: 205 lb (93 kg)   Height: 5' 9\" (1.753 m)     No flowsheet data found. Body mass index is 30.27 kg/m².      Wt Readings from Last 3 Encounters:   07/09/20 205 lb (93 kg)   06/22/20 206 lb 8 oz (93.7 kg)   01/25/19 215 lb (97.5 kg)     BP Readings from Last 3 Encounters:   07/09/20 obstructive airway disease, FEV1/FVC of 59, FEV1 of 1.87 [55% predicted], normal DLCO, significant bronchodilator reversibility. Based on history, patient has COPD-would commence patient on Bevespi inhaler, 2 puffs twice daily which she can use with the spacer device which he already has access to. Continue with albuterol inhaler as needed. Discussed with patient about the role of pulmonary rehabitation-currently is not keen upon this. Did encourage him to exercise at home, which might not only be beneficial for COPD but also for his history of hypertension. Reviewed reports of recent 2D echo and stress test obtained at [facility] of which were unremarkable. Offered Pneumovax 23 which would be recommended in COPD patients, patient declined. Will obtain low-dose CT imaging for lung nodule evaluation, based on smoking history. Return in about 3 months (around 10/9/2020).

## 2020-07-10 ENCOUNTER — TELEPHONE (OUTPATIENT)
Dept: PULMONOLOGY | Age: 65
End: 2020-07-10

## 2020-07-27 ENCOUNTER — OFFICE VISIT (OUTPATIENT)
Dept: FAMILY MEDICINE CLINIC | Age: 65
End: 2020-07-27
Payer: COMMERCIAL

## 2020-07-27 VITALS
RESPIRATION RATE: 12 BRPM | HEIGHT: 69 IN | WEIGHT: 204.5 LBS | DIASTOLIC BLOOD PRESSURE: 66 MMHG | HEART RATE: 58 BPM | SYSTOLIC BLOOD PRESSURE: 130 MMHG | TEMPERATURE: 98.3 F | BODY MASS INDEX: 30.29 KG/M2 | OXYGEN SATURATION: 98 %

## 2020-07-27 PROBLEM — N18.30 CHRONIC RENAL FAILURE, STAGE 3 (MODERATE) (HCC): Status: ACTIVE | Noted: 2020-07-27

## 2020-07-27 PROCEDURE — 99396 PREV VISIT EST AGE 40-64: CPT | Performed by: FAMILY MEDICINE

## 2020-07-27 RX ORDER — HYDROCHLOROTHIAZIDE 12.5 MG/1
12.5 TABLET ORAL DAILY
Qty: 90 TABLET | Refills: 1 | Status: SHIPPED | OUTPATIENT
Start: 2020-07-27 | End: 2020-12-16

## 2020-07-27 RX ORDER — LOVASTATIN 10 MG/1
TABLET ORAL
Qty: 90 TABLET | Refills: 1 | Status: SHIPPED | OUTPATIENT
Start: 2020-07-27 | End: 2020-12-16 | Stop reason: SDUPTHER

## 2020-07-27 RX ORDER — HYDROCHLOROTHIAZIDE 12.5 MG/1
12.5 TABLET ORAL DAILY
COMMUNITY
End: 2020-07-27 | Stop reason: SDUPTHER

## 2020-07-27 RX ORDER — LISINOPRIL 20 MG/1
20 TABLET ORAL DAILY
Qty: 90 TABLET | Refills: 1 | Status: SHIPPED | OUTPATIENT
Start: 2020-07-27 | End: 2020-12-16

## 2020-07-27 RX ORDER — LEVOTHYROXINE SODIUM 0.05 MG/1
TABLET ORAL
Qty: 90 TABLET | Refills: 1 | Status: SHIPPED | OUTPATIENT
Start: 2020-07-27 | End: 2020-12-16 | Stop reason: SDUPTHER

## 2020-07-27 ASSESSMENT — PATIENT HEALTH QUESTIONNAIRE - PHQ9
1. LITTLE INTEREST OR PLEASURE IN DOING THINGS: 0
2. FEELING DOWN, DEPRESSED OR HOPELESS: 0
SUM OF ALL RESPONSES TO PHQ QUESTIONS 1-9: 0
SUM OF ALL RESPONSES TO PHQ9 QUESTIONS 1 & 2: 0
SUM OF ALL RESPONSES TO PHQ QUESTIONS 1-9: 0

## 2020-07-27 NOTE — PROGRESS NOTES
History and Physical      Daisha Davenport  YOB: 1955    Date of Service:  7/27/2020    Chief Complaint:   Daisha Davenport is a 60 y.o. male who  presents for physical examination. HPI: Patient presents for physical examination but he has multiple other issues as he is accompanied by his wife. Wife states she went back and reviewed his laboratory profile that he gets through the workplace for the last 5+ years and his GFR is always been about 61. She was concerned that on his most recent laboratory testing that he was working out in her yard and his kidney function deteriorated rather quickly overnight. Since last office appointment he was changed back per his request to the blood pressure medication he was taken in addition to that hydrochlorothiazide. With that his blood pressure has been stable at home. They were concerned with using the inhaler that this could cause elevation of blood pressure and he did not want to use this although he still having the cough and wheezing episodes. Pulmonary function did demonstrate FEV1 of 55% and significant bronchodilator reversibility. Patient does have an appointment with nephrology in the next week.   Patient has been monitoring his salt in his diet but he drinks a fair amount of soda on a daily basis    Wt Readings from Last 3 Encounters:   07/27/20 204 lb 8 oz (92.8 kg)   07/09/20 205 lb (93 kg)   06/22/20 206 lb 8 oz (93.7 kg)     BP Readings from Last 3 Encounters:   07/27/20 130/66   07/09/20 138/84   06/22/20 (!) 140/70       Patient Active Problem List   Diagnosis    Anxiety state    Essential hypertension    Generalized osteoarthrosis, involving multiple sites    Mixed hyperlipidemia    Rosacea    COPD (chronic obstructive pulmonary disease) ([facility] [address]    Hypothyroid    Urinary hesitancy due to benign prostatic hyperplasia    Primary osteoarthritis of right knee       Allergies   Allergen Reactions    Diovan [Valsartan]     Ezetimibe-Simvastatin     Biaxin [Clarithromycin] Rash     itching     Outpatient Medications Marked as Taking for the 7/27/20 encounter (Office Visit) with Thania Grier MD   Medication Sig Dispense Refill    hydrochlorothiazide (HYDRODIURIL) 12.5 MG tablet Take 12.5 mg by mouth daily      lisinopril (PRINIVIL;ZESTRIL) 20 MG tablet Take 1 tablet by mouth daily 30 tablet 2    amLODIPine (NORVASC) 10 MG tablet TAKE 1 TABLET DAILY 90 tablet 0    levothyroxine (SYNTHROID) 50 MCG tablet TAKE 1 TABLET DAILY 90 tablet 0    lovastatin (MEVACOR) 10 MG tablet TAKE 1 TABLET NIGHTLY 90 tablet 0    albuterol sulfate HFA (PROAIR HFA) 108 (90 Base) MCG/ACT inhaler Inhale 2 puffs into the lungs every 6 hours as needed for Wheezing 1 Inhaler 1    fluticasone (CUTIVATE) 0.05 % cream Apply topically 2 times daily as needed.  60 g 3       Past Medical History:   Diagnosis Date    Anxiety state, unspecified     Chronic airway obstruction, not elsewhere classified     Generalized osteoarthrosis, involving multiple sites     Insomnia, unspecified     Other and unspecified hyperlipidemia     Rosacea     Thyroid disease     Unspecified essential hypertension      Past Surgical History:   Procedure Laterality Date    CLAVICLE EXCISION       Family History   Problem Relation Age of Onset    Emphysema Mother     Stroke Father 76    Kidney Disease Brother         renal cancer    High Blood Pressure Brother      Social History     Socioeconomic History    Marital status:      Spouse name: Not on file    Number of children: Not on file    Years of education: Not on file    Highest education level: Not on file   Occupational History    Not on file   Social Needs    Financial resource strain: Not on file    Food insecurity     Worry: Not on file     Inability: Not on file    Transportation needs     Medical: Not on file     Non-medical: Not on file   Tobacco Use    Smoking status: Former Smoker Packs/day: 2.50     Years: 33.00     Pack years: 82.50     Types: Cigarettes     Last attempt to quit: 2006     Years since quittin.6    Smokeless tobacco: Never Used    Tobacco comment: smoked for 33 yrs / smoked up to 2.5   Substance and Sexual Activity    Alcohol use: Yes     Alcohol/week: 0.0 standard drinks     Frequency: Monthly or less     Comment: occasional alcohol use     Drug use: No    Sexual activity: Not on file   Lifestyle    Physical activity     Days per week: Not on file     Minutes per session: Not on file    Stress: Not on file   Relationships    Social connections     Talks on phone: Not on file     Gets together: Not on file     Attends Yazidism service: Not on file     Active member of club or organization: Not on file     Attends meetings of clubs or organizations: Not on file     Relationship status: Not on file    Intimate partner violence     Fear of current or ex partner: Not on file     Emotionally abused: Not on file     Physically abused: Not on file     Forced sexual activity: Not on file   Other Topics Concern    Not on file   Social History Narrative    Not on file       Self-testicular exams: No  Sexual activity: single partner, contraception - none   Last eye exam: 2019, normal  Exercise: walks 3 time(s) per week    Review of Systems:  A comprehensive review of systems was negative except for what was noted in the HPI. Physical Exam:   Vitals:    20 0833   BP: 130/66   Site: Right Upper Arm   Position: Sitting   Cuff Size: Medium Adult   Pulse: 58   Resp: 12   Temp: 98.3 °F (36.8 °C)   TempSrc: Infrared   SpO2: 98%   Weight: 204 lb 8 oz (92.8 kg)   Height: 5' 9\" (1.753 m)     Body mass index is 30.2 kg/m². Constitutional: He is oriented to person, place, and time. He appears well-developed and well-nourished. No distress. HEENT:   Head: Normocephalic and atraumatic.    Right Ear: Tympanic membrane, external ear and ear canal normal.   Left Ear: 10/27/1976)    Flu vaccine (1) 09/01/2020    Colon cancer screen colonoscopy  09/28/2028    Hepatitis A vaccine  Aged Out    Hepatitis B vaccine  Aged Out    Hib vaccine  Aged Out    Meningococcal (ACWY) vaccine  Aged [employer] plan of care for Malissa King        7/27/2020           Preventive Measures Status       Recommendations for screening   Prostate Cancer Screen  Lab Results   Component Value Date    PSA 0.7 11/29/2013      Repeat yearly    Colon Cancer Screen  Last colonoscopy: 2018 Repeat in 10 years   Diabetes Screen  Glucose (mg/dL)   Date Value   12/14/2017 103 (H)    Repeat yearly   Cholesterol Screen  No results found for: CHOL, TRIG, HDL, LDLCALC, LDLDIRECT Screening not needed due to existing diagnosis   Aspirin for Cardiovascular Prevention   Yes Continue daily aspirin   Weight: Body mass index is 30.2 kg/m². 5' 9\" (1.753 m)204 lb 8 oz (92.8 kg)  Your BMI is 25 or greater, which indicates that you are overweight   Living Will: No Patient declined    Recommended Immunizations    Immunization History   Administered Date(s) Administered    Influenza Virus Vaccine 10/21/2010, 10/21/2011, 10/03/2012, 11/28/2014    Influenza vaccine:  recommended every fall         Other Recommendations ·   Try to get at least 30 minutes of exercise 3-4 days per week             Assessment/Plan:  Ania Nixon was seen today for annual exam.    Diagnoses and all orders for this visit:    Well adult exam    Essential hypertension  -     Comprehensive Metabolic Panel - [facility]ros; Future  -     Lipid Panel; Future    Pulmonary emphysema, unspecified emphysema type (HCC)    Hypothyroidism, unspecified type    Urinary hesitancy due to benign prostatic hyperplasia    Chronic renal failure, stage 3 (moderate) (HCC)    Other orders  -     lisinopril (PRINIVIL;ZESTRIL) 20 MG tablet; Take 1 tablet by mouth daily  -     hydrochlorothiazide (HYDRODIURIL) 12.5 MG tablet;  Take 1 tablet by mouth daily  -     lovastatin

## 2020-07-31 PROBLEM — I16.0 HYPERTENSIVE URGENCY: Status: ACTIVE | Noted: 2020-06-15

## 2020-08-03 RX ORDER — AMLODIPINE BESYLATE 10 MG/1
TABLET ORAL
Qty: 90 TABLET | Refills: 1 | Status: SHIPPED | OUTPATIENT
Start: 2020-08-03 | End: 2021-03-16 | Stop reason: SDUPTHER

## 2020-08-03 NOTE — TELEPHONE ENCOUNTER
Patient needs medication sent to [facility]       Disp  Refills  Start  End     amLODIPine (NORVASC) 10 MG tablet  90 tablet  0  6/23/2020      Sig: TAKE 1 TABLET DAILY      [facility] - Yahaira Kimball, [address] [place] [address] [phone] Carmita Jimenez [phone]      Please advise

## 2020-08-07 ENCOUNTER — HOSPITAL ENCOUNTER (OUTPATIENT)
Dept: CT IMAGING | Age: 65
Discharge: HOME | End: 2020-08-07
Payer: COMMERCIAL

## 2020-08-07 PROCEDURE — G0297 LDCT FOR LUNG CA SCREEN: HCPCS

## 2020-12-16 RX ORDER — LOVASTATIN 10 MG/1
TABLET ORAL
Qty: 90 TABLET | Refills: 0 | Status: SHIPPED | OUTPATIENT
Start: 2020-12-16 | End: 2021-03-16 | Stop reason: SDUPTHER

## 2020-12-16 RX ORDER — HYDROCHLOROTHIAZIDE 12.5 MG/1
TABLET ORAL
Qty: 90 TABLET | Refills: 0 | Status: SHIPPED | OUTPATIENT
Start: 2020-12-16 | End: 2021-01-04

## 2020-12-16 RX ORDER — LISINOPRIL 20 MG/1
TABLET ORAL
Qty: 90 TABLET | Refills: 0 | Status: SHIPPED | OUTPATIENT
Start: 2020-12-16 | End: 2021-01-04

## 2020-12-16 RX ORDER — LEVOTHYROXINE SODIUM 0.05 MG/1
TABLET ORAL
Qty: 90 TABLET | Refills: 0 | Status: SHIPPED | OUTPATIENT
Start: 2020-12-16 | End: 2021-03-16 | Stop reason: SDUPTHER

## 2020-12-22 ENCOUNTER — TELEPHONE (OUTPATIENT)
Dept: FAMILY MEDICINE CLINIC | Age: 65
End: 2020-12-22

## 2020-12-22 NOTE — TELEPHONE ENCOUNTER
Patient has been hospitalized since 12/6 at [facility]. Patient's wife wanted to make sure Dr Khurram Kahn was aware of what was going on. His records should be available in Care Everywhere. Please call Julissa's cell phone with questions.

## 2020-12-29 ENCOUNTER — TELEPHONE (OUTPATIENT)
Dept: FAMILY MEDICINE CLINIC | Age: 65
End: 2020-12-29

## 2020-12-30 NOTE — TELEPHONE ENCOUNTER
I called and spoke with Macho Grey from [facility], I informed her that this was approved.

## 2020-12-31 ENCOUNTER — TELEPHONE (OUTPATIENT)
Dept: FAMILY MEDICINE CLINIC | Age: 65
End: 2020-12-31

## 2020-12-31 NOTE — TELEPHONE ENCOUNTER
DON FROM [facility] NEEDS VERBAL ORDERS TO SEE PATIENT FOR PHYSICAL THERAPY 2X A WEEK FOR ONE WEEK DECREASED TO ONCE A WEEK FOR 3 WEEK.   [facility] [phone]

## 2021-01-04 ENCOUNTER — OFFICE VISIT (OUTPATIENT)
Dept: FAMILY MEDICINE CLINIC | Age: 66
End: 2021-01-04
Payer: COMMERCIAL

## 2021-01-04 VITALS
WEIGHT: 175 LBS | BODY MASS INDEX: 25.11 KG/M2 | TEMPERATURE: 97.8 F | SYSTOLIC BLOOD PRESSURE: 112 MMHG | OXYGEN SATURATION: 98 % | HEART RATE: 99 BPM | DIASTOLIC BLOOD PRESSURE: 60 MMHG

## 2021-01-04 DIAGNOSIS — K55.069 SUPERIOR MESENTERIC ARTERY THROMBOSIS (HCC): Primary | ICD-10-CM

## 2021-01-04 DIAGNOSIS — D64.9 ANEMIA, UNSPECIFIED TYPE: ICD-10-CM

## 2021-01-04 DIAGNOSIS — I48.20 CHRONIC ATRIAL FIBRILLATION (HCC): ICD-10-CM

## 2021-01-04 DIAGNOSIS — I10 ESSENTIAL HYPERTENSION: ICD-10-CM

## 2021-01-04 DIAGNOSIS — D62 ACUTE BLOOD LOSS ANEMIA: ICD-10-CM

## 2021-01-04 DIAGNOSIS — N18.30 CHRONIC RENAL FAILURE, STAGE 3 (MODERATE), UNSPECIFIED WHETHER STAGE 3A OR 3B CKD (HCC): ICD-10-CM

## 2021-01-04 PROBLEM — I16.0 HYPERTENSIVE URGENCY: Status: RESOLVED | Noted: 2020-06-15 | Resolved: 2021-01-04

## 2021-01-04 PROCEDURE — 99215 OFFICE O/P EST HI 40 MIN: CPT | Performed by: FAMILY MEDICINE

## 2021-01-04 RX ORDER — LOPERAMIDE HYDROCHLORIDE 2 MG/1
2 CAPSULE ORAL 2 TIMES DAILY PRN
COMMUNITY
Start: 2020-12-29

## 2021-01-04 RX ORDER — FLUTICASONE PROPIONATE 50 MCG
2 SPRAY, SUSPENSION (ML) NASAL DAILY
COMMUNITY
Start: 2020-12-29

## 2021-01-04 RX ORDER — SODIUM BICARBONATE 650 MG/1
2 TABLET ORAL 3 TIMES DAILY
COMMUNITY
Start: 2020-12-29 | End: 2021-02-05 | Stop reason: ALTCHOICE

## 2021-01-04 RX ORDER — METHOCARBAMOL 500 MG/1
500 TABLET, FILM COATED ORAL 3 TIMES DAILY PRN
COMMUNITY
Start: 2020-12-29 | End: 2021-02-05 | Stop reason: ALTCHOICE

## 2021-01-04 RX ORDER — OXYCODONE HYDROCHLORIDE 5 MG/1
TABLET ORAL EVERY 4 HOURS PRN
COMMUNITY
Start: 2020-12-29 | End: 2021-01-05 | Stop reason: SDUPTHER

## 2021-01-04 RX ORDER — NICOTINE 14MG/24HR
1 PATCH, TRANSDERMAL 24 HOURS TRANSDERMAL 2 TIMES DAILY
COMMUNITY
Start: 2020-12-29 | End: 2021-02-05 | Stop reason: ALTCHOICE

## 2021-01-04 RX ORDER — CHOLESTYRAMINE 4 G/9G
POWDER, FOR SUSPENSION ORAL 2 TIMES DAILY
COMMUNITY
Start: 2020-12-29 | End: 2021-06-14

## 2021-01-04 RX ORDER — SACCHAROMYCES BOULARDII 250 MG
250 CAPSULE ORAL 2 TIMES DAILY
COMMUNITY
Start: 2020-12-29 | End: 2021-02-05 | Stop reason: DRUGHIGH

## 2021-01-04 NOTE — PROGRESS NOTES
Subjective:      Patient ID: Cain Griffin is a 63 y.o. male. CC: Patient presents for hospital follow-up. HPI Patient presents in hospital follow-up accompanied with his wife. Patient presented to the hospital December 6 for abdominal pain and was discharged December 29. Patient initially was found to have small bowel ischemia secondary to acute SMA embolization. Also found to have atrial fibrillation. Initially patient was taken to surgery for SMA thrombectomy and exploratory laparotomy. And then had to have a second procedure for ischemic small bowel and underwent small bowel resection. Small bowel was slow to return to normal and patient had persistent diarrhea. Gastroenterology felt this was either secondary to short bowel syndrome versus bile salt diarrhea. Patient was placed on Questran and Imodium and was can be followed with gastroenterology in the future. Patient also has significant peripheral vascular disease underwent a thrombectomy with a stent placement in the left popliteal artery. Patient was found to have atrial fibrillation and chronic renal failure and hypertension and thought to be stable by discharge. Patient's discharge new medications include Eliquis, cholestyramine, clonidine as needed, sodium bicarb and probiotic. Patient states he has not taken the sodium bicarb nor the probiotic at this point of time as he was unsure why he was taking his medications. Patient states when he awakens in the morning he has good energy but within several hours his energy level is depleted and he feels poorly throughout the day. He is not sure if this is depression or related to the medications. Patient was also found to have significant anemia during the hospitalization was treated with blood transfusions and IV iron replacement. His last hemoglobin upon discharge from the hospital was 6.9 but he that was transfused that day.   Patient's wife states that she is off work until January 15 and is unsure if she will be able to return to work as he requires a fair amount of care. He states he is lost 10+ pounds since he left the hospital as he is unable to eat large meals. Review of Systems     Patient Active Problem List   Diagnosis    Anxiety state    Essential hypertension    Generalized osteoarthrosis, involving multiple sites    Mixed hyperlipidemia    Rosacea    COPD (chronic obstructive pulmonary disease) ([address]    Hypothyroid    Urinary hesitancy due to benign prostatic hyperplasia    Primary osteoarthritis of right knee    Chronic renal failure, stage 3 (moderate)    Hypertensive urgency    Pinguecula       Outpatient Medications Marked as Taking for the 1/4/21 encounter (Office Visit) with Al Brock MD   Medication Sig Dispense Refill    apixaban (ELIQUIS) 5 MG TABS tablet Take 5 mg by mouth 2 times daily      cholestyramine (QUESTRAN) 4 g packet 2 times daily      fluticasone (FLONASE) 50 MCG/ACT nasal spray 2 sprays by Nasal route daily      loperamide (IMODIUM) 2 MG capsule Take 2 mg by mouth 2 times daily      methocarbamol (ROBAXIN) 500 MG tablet Take 500 mg by mouth 3 times daily as needed      metoprolol tartrate (LOPRESSOR) 25 MG tablet Take 25 mg by mouth 2 times daily      oxyCODONE (ROXICODONE) 5 MG immediate release tablet every 4 hours as needed.       saccharomyces boulardii (FLORASTOR) 250 MG capsule Take 250 mg by mouth 2 times daily      sodium bicarbonate 650 MG tablet 2 tablets 3 times daily      Saccharomyces boulardii (PROBIOTIC) 250 MG CAPS 1 capsule 2 times daily      lovastatin (MEVACOR) 10 MG tablet TAKE 1 TABLET NIGHTLY 90 tablet 0    levothyroxine (SYNTHROID) 50 MCG tablet TAKE 1 TABLET DAILY 90 tablet 0    amLODIPine (NORVASC) 10 MG tablet TAKE 1 TABLET DAILY 90 tablet 1    albuterol sulfate HFA (PROAIR HFA) 108 (90 Base) MCG/ACT inhaler Inhale 2 puffs into the lungs every 6 hours as needed for Wheezing 1 Inhaler 1    fluticasone (CUTIVATE) 0.05 % cream Apply topically 2 times daily as needed. 60 g 3       Allergies   Allergen Reactions    Diovan [Valsartan]     Ezetimibe-Simvastatin     Biaxin [Clarithromycin] Rash     itching       Social History     Tobacco Use    Smoking status: Former Smoker     Packs/day: 2.50     Years: 33.00     Pack years: 82.50     Types: Cigarettes     Quit date: 2006     Years since quittin.0    Smokeless tobacco: Never Used    Tobacco comment: smoked for 33 yrs / smoked up to 2.5   Substance Use Topics    Alcohol use: Yes     Alcohol/week: 0.0 standard drinks     Frequency: Monthly or less     Comment: occasional alcohol use        /60 (Site: Left Upper Arm, Position: Sitting, Cuff Size: Medium Adult)   Pulse 99   Temp 97.8 °F (36.6 °C)   Wt 175 lb (79.4 kg)   SpO2 98%   BMI 25.11 kg/m²         Objective:   Physical Exam  Constitutional:       General: He is not in acute distress. Appearance: He is underweight. He is ill-appearing. Neck:      Vascular: No carotid bruit. Cardiovascular:      Rate and Rhythm: Normal rate and regular rhythm. Pulses:           Dorsalis pedis pulses are 2+ on the right side and 2+ on the left side. Posterior tibial pulses are 2+ on the right side and 2+ on the left side. Heart sounds: Normal heart sounds. No murmur. No friction rub. No gallop. Pulmonary:      Effort: Pulmonary effort is normal.      Breath sounds: Normal breath sounds. Abdominal:      General: Bowel sounds are normal. There is no distension. Palpations: Abdomen is soft. Musculoskeletal:         General: No tenderness. Right lower leg: Edema (2 plus ankle edema bilaterally) present. Left lower leg: Edema present. Neurological:      Mental Status: He is alert and oriented to person, place, and time. Sensory: Sensation is intact. Motor: Motor function is intact. Psychiatric:         Behavior: Behavior is cooperative. Assessment:      Cholo German was seen today for follow-up from hospital.    Diagnoses and all orders for this visit:    Superior mesenteric artery thrombosis (HCC)    Chronic renal failure, stage 3 (moderate), unspecified whether stage 3a or 3b CKD  -     CBC; Future  -     BASIC METABOLIC PANEL; Future  -     URIC ACID; Future    Anemia, unspecified type  -     CBC; Future    Chronic atrial fibrillation (HCC)    Acute blood loss anemia    Essential hypertension            Plan:      Hospital information reviewed with patient and wife. Certainly the embolization is probably secondary to the atrial fibrillation and recommend that he maintain long-term anticoagulation. The diarrhea and poor appetite was related to the recent surgery and recommended that he start taking protein supplementation twice daily and eat 4-5 small meals per day    Certainly he needs additional laboratory testing performed in regards to the anemia and chronic renal failure. We will need to determine further management based on that and we discussed possibly restarting the sodium bicarb medication. For the atrial fibrillation I recommend that he continue with metoprolol and blood thinner and cardiology appointment. We discussed that the metoprolol is probably not causing his fatigue it is more related to the recent surgery and anemia and weakness in general.  Certainly encourage patient to work with physical therapy and Occupational Therapy. Also discussed that indeed he may be depressed or this may be related to underlying medical issues and we will decide further management after his blood testing. Patient is open to take medication for depression if necessary    RTC in 1 month    Keep appoint with specialty physicians as outlined in his discharge plan    Medical decision making of high complexity      Please note that this chart was generated using Dragon dictation software.  Although every effort was made to ensure the accuracy of this automated transcription, some errors in transcription may have occurred.

## 2021-01-05 ENCOUNTER — TELEPHONE (OUTPATIENT)
Dept: FAMILY MEDICINE CLINIC | Age: 66
End: 2021-01-05

## 2021-01-05 DIAGNOSIS — R10.84 ABDOMINAL PAIN, ACUTE, GENERALIZED: Primary | ICD-10-CM

## 2021-01-05 RX ORDER — OXYCODONE HYDROCHLORIDE 5 MG/1
5 TABLET ORAL EVERY 4 HOURS PRN
Qty: 60 TABLET | Refills: 0 | Status: SHIPPED | OUTPATIENT
Start: 2021-01-05 | End: 2021-01-19

## 2021-01-05 NOTE — TELEPHONE ENCOUNTER
Patient was seen for hospital f/u on 1/4. Patient forgot to mention needing pain meds during the visit to Dr Lorraine Kern. Patient wants to know if Dr Lorraine Kern will prescribe him pain meds since he had a major surgery.       Please advise

## 2021-01-06 ENCOUNTER — TELEPHONE (OUTPATIENT)
Dept: FAMILY MEDICINE CLINIC | Age: 66
End: 2021-01-06

## 2021-01-06 RX ORDER — FLUOXETINE HYDROCHLORIDE 20 MG/1
20 CAPSULE ORAL DAILY
Qty: 30 CAPSULE | Refills: 3 | Status: SHIPPED | OUTPATIENT
Start: 2021-01-06 | End: 2021-02-05 | Stop reason: ALTCHOICE

## 2021-01-06 NOTE — TELEPHONE ENCOUNTER
Advised about lab results that anemia is improving, kidney test is stable and ok to stay off the Sodium Bicarb tablets, low protein level so add some protein drinks and also sent script over for depression. See labs in chart.

## 2021-01-08 ENCOUNTER — TELEPHONE (OUTPATIENT)
Dept: FAMILY MEDICINE CLINIC | Age: 66
End: 2021-01-08

## 2021-01-21 ENCOUNTER — TELEPHONE (OUTPATIENT)
Dept: FAMILY MEDICINE CLINIC | Age: 66
End: 2021-01-21

## 2021-01-21 NOTE — TELEPHONE ENCOUNTER
Wife calling about advice for patient's meds. Patient is currently on metoprolol but was recommended by his heart Dr to try the new drug Satolol. They want to know what Dr Lorraine Kern thinks, if one may be better than the other.       Provider out of office    Please advise

## 2021-01-26 ENCOUNTER — TELEPHONE (OUTPATIENT)
Dept: FAMILY MEDICINE CLINIC | Age: 66
End: 2021-01-26

## 2021-02-05 ENCOUNTER — OFFICE VISIT (OUTPATIENT)
Dept: FAMILY MEDICINE CLINIC | Age: 66
End: 2021-02-05
Payer: COMMERCIAL

## 2021-02-05 VITALS
HEART RATE: 91 BPM | SYSTOLIC BLOOD PRESSURE: 110 MMHG | WEIGHT: 177.2 LBS | DIASTOLIC BLOOD PRESSURE: 72 MMHG | BODY MASS INDEX: 25.43 KG/M2 | OXYGEN SATURATION: 99 % | TEMPERATURE: 96.9 F

## 2021-02-05 DIAGNOSIS — I10 ESSENTIAL HYPERTENSION: ICD-10-CM

## 2021-02-05 DIAGNOSIS — D62 ACUTE BLOOD LOSS ANEMIA: Primary | ICD-10-CM

## 2021-02-05 DIAGNOSIS — N18.30 CHRONIC RENAL FAILURE, STAGE 3 (MODERATE), UNSPECIFIED WHETHER STAGE 3A OR 3B CKD (HCC): ICD-10-CM

## 2021-02-05 DIAGNOSIS — E03.9 HYPOTHYROIDISM, UNSPECIFIED TYPE: ICD-10-CM

## 2021-02-05 DIAGNOSIS — I48.0 PAROXYSMAL ATRIAL FIBRILLATION (HCC): ICD-10-CM

## 2021-02-05 PROCEDURE — 99214 OFFICE O/P EST MOD 30 MIN: CPT | Performed by: FAMILY MEDICINE

## 2021-02-05 RX ORDER — FERROUS SULFATE 325(65) MG
325 TABLET ORAL 2 TIMES DAILY
Qty: 180 TABLET | Refills: 1 | Status: SHIPPED | OUTPATIENT
Start: 2021-02-05 | End: 2021-03-16 | Stop reason: DRUGHIGH

## 2021-02-05 RX ORDER — METOPROLOL TARTRATE 50 MG/1
50 TABLET, FILM COATED ORAL 2 TIMES DAILY
Qty: 180 TABLET | Refills: 1 | Status: SHIPPED | OUTPATIENT
Start: 2021-02-05 | End: 2021-06-01 | Stop reason: SDUPTHER

## 2021-02-05 RX ORDER — SACCHAROMYCES BOULARDII 250 MG
250 CAPSULE ORAL DAILY
Status: SHIPPED | COMMUNITY
Start: 2021-02-05 | End: 2021-06-14

## 2021-02-05 ASSESSMENT — PATIENT HEALTH QUESTIONNAIRE - PHQ9
SUM OF ALL RESPONSES TO PHQ9 QUESTIONS 1 & 2: 0
SUM OF ALL RESPONSES TO PHQ QUESTIONS 1-9: 0
SUM OF ALL RESPONSES TO PHQ QUESTIONS 1-9: 0
1. LITTLE INTEREST OR PLEASURE IN DOING THINGS: 0

## 2021-02-05 NOTE — PROGRESS NOTES
ubjective:      Patient ID: Lenard Bunn is a 63 y.o. male. CC: Patient presents for re-evaluation of chronic health problems including acute blood loss anemia, chronic renal failure, atrial fibrillation, hypertension, hypothyroidism and vascular disease. Peg Dover HPI Patient presents for a one month follow-up today in accompaniment of wife. Patient still gets very winded walking around. Patient's breathing has improved. They would like to get a second cardiac opinion in regards to his atrial fibrillation. They were evaluated with Dr. Bro Gonsalves and he was going to change the metoprolol to sotalol but wife went through the possible side effects and decided this was not a good medication for him. They have been compliant with the metoprolol medication and blood thinner. He did not start antidepressant medication and he discontinued the probiotic medication, allopurinol and sodium bicarb pills. He is eating better and he feels the diarrhea problem has resolved. Laboratory profile in the interim improved but not quite back to normal with hemoglobin up to 10.6. The metabolic acidosis also has improved even off the sodium bicarb pills. They have also revisited general surgery and vascular surgery both of whom thought he was doing extremely well but wife was concerned the podiatrist recently discussed that he ultimately may lose his left great toe but clinically his toe seems to be improving. His energy and stamina are slowly improving but he is not able to do any extended standing or walking without becoming fatigued.     Review of Systems     Patient Active Problem List   Diagnosis    Anxiety state    Essential hypertension    Generalized osteoarthrosis, involving multiple sites    Mixed hyperlipidemia    Rosacea    COPD (chronic obstructive pulmonary disease) (HCC)    Hypothyroid    Urinary hesitancy due to benign prostatic hyperplasia    Primary osteoarthritis of right knee    Chronic renal failure, stage 3 (moderate)    Pinguecula    Acute blood loss anemia       Outpatient Medications Marked as Taking for the 21 encounter (Office Visit) with Onofre Camacho MD   Medication Sig Dispense Refill    saccharomyces boulardii (FLORASTOR) 250 MG capsule Take 1 capsule by mouth daily      apixaban (ELIQUIS) 5 MG TABS tablet Take 5 mg by mouth 2 times daily      fluticasone (FLONASE) 50 MCG/ACT nasal spray 2 sprays by Nasal route daily      metoprolol tartrate (LOPRESSOR) 25 MG tablet Take 25 mg by mouth 2 times daily      lovastatin (MEVACOR) 10 MG tablet TAKE 1 TABLET NIGHTLY 90 tablet 0    levothyroxine (SYNTHROID) 50 MCG tablet TAKE 1 TABLET DAILY 90 tablet 0    amLODIPine (NORVASC) 10 MG tablet TAKE 1 TABLET DAILY 90 tablet 1    albuterol sulfate HFA (PROAIR HFA) 108 (90 Base) MCG/ACT inhaler Inhale 2 puffs into the lungs every 6 hours as needed for Wheezing 1 Inhaler 1    fluticasone (CUTIVATE) 0.05 % cream Apply topically 2 times daily as needed. 60 g 3       Allergies   Allergen Reactions    Diovan [Valsartan]     Ezetimibe-Simvastatin     Ezetimibe-Simvastatin     Biaxin [Clarithromycin] Rash     itching       Social History     Tobacco Use    Smoking status: Former Smoker     Packs/day: 2.50     Years: 33.00     Pack years: 82.50     Types: Cigarettes     Quit date: 2006     Years since quittin.1    Smokeless tobacco: Never Used    Tobacco comment: smoked for 33 yrs / smoked up to 2.5   Substance Use Topics    Alcohol use: Yes     Alcohol/week: 0.0 standard drinks     Frequency: Monthly or less     Comment: occasional alcohol use        /72 (Site: Left Upper Arm, Position: Sitting, Cuff Size: Medium Adult)   Pulse 91   Temp 96.9 °F (36.1 °C) (Infrared)   Wt 177 lb 3.2 oz (80.4 kg)   SpO2 99%   BMI 25.43 kg/m²         Objective:   Physical Exam  Constitutional:       General: He is not in acute distress. Appearance: He is well-developed.    Neck:      Vascular: No

## 2021-02-08 ENCOUNTER — TELEPHONE (OUTPATIENT)
Dept: CARDIOLOGY CLINIC | Age: 66
End: 2021-02-08

## 2021-02-08 NOTE — TELEPHONE ENCOUNTER
Pt wife calling she had to cancel her husbands appt with [facility] in feb because her insurance only covers [facility] his first available is April 20th pt wants to know if this can be any sooner? pls call to advise thank you

## 2021-02-08 NOTE — TELEPHONE ENCOUNTER
Spoke to the pt. He has been having episodes of a-fib for years, Did wear a monitor then, and was diagnosed as a caffeine overdose. Now having much more shortness of breath.

## 2021-02-09 NOTE — TELEPHONE ENCOUNTER
Called pt wife and explained to her if [facility] is covered on her plan then [facility] is also covered. However she was afraid to change back to [facility] since her insurance said he wasn't covered. So I wang pt to 02/16 2:15pm with [facility].

## 2021-03-16 ENCOUNTER — OFFICE VISIT (OUTPATIENT)
Dept: FAMILY MEDICINE CLINIC | Age: 66
End: 2021-03-16
Payer: COMMERCIAL

## 2021-03-16 VITALS
TEMPERATURE: 97.2 F | SYSTOLIC BLOOD PRESSURE: 120 MMHG | BODY MASS INDEX: 26.26 KG/M2 | WEIGHT: 183 LBS | HEART RATE: 60 BPM | OXYGEN SATURATION: 98 % | DIASTOLIC BLOOD PRESSURE: 60 MMHG

## 2021-03-16 DIAGNOSIS — N18.30 CHRONIC RENAL FAILURE, STAGE 3 (MODERATE), UNSPECIFIED WHETHER STAGE 3A OR 3B CKD (HCC): ICD-10-CM

## 2021-03-16 DIAGNOSIS — Z12.5 SCREENING PSA (PROSTATE SPECIFIC ANTIGEN): ICD-10-CM

## 2021-03-16 DIAGNOSIS — D62 ACUTE BLOOD LOSS ANEMIA: ICD-10-CM

## 2021-03-16 DIAGNOSIS — J43.9 PULMONARY EMPHYSEMA, UNSPECIFIED EMPHYSEMA TYPE (HCC): ICD-10-CM

## 2021-03-16 DIAGNOSIS — B96.89 ACUTE BACTERIAL SINUSITIS: Primary | ICD-10-CM

## 2021-03-16 DIAGNOSIS — I48.0 PAROXYSMAL ATRIAL FIBRILLATION (HCC): ICD-10-CM

## 2021-03-16 DIAGNOSIS — J01.90 ACUTE BACTERIAL SINUSITIS: Primary | ICD-10-CM

## 2021-03-16 DIAGNOSIS — E03.9 HYPOTHYROIDISM, UNSPECIFIED TYPE: ICD-10-CM

## 2021-03-16 PROCEDURE — 99214 OFFICE O/P EST MOD 30 MIN: CPT | Performed by: FAMILY MEDICINE

## 2021-03-16 RX ORDER — LOVASTATIN 10 MG/1
TABLET ORAL
Qty: 90 TABLET | Refills: 1 | Status: SHIPPED | OUTPATIENT
Start: 2021-03-16 | End: 2021-12-27 | Stop reason: SDUPTHER

## 2021-03-16 RX ORDER — FERROUS SULFATE 325(65) MG
325 TABLET ORAL 2 TIMES DAILY
Qty: 180 TABLET | Refills: 1 | Status: SHIPPED | OUTPATIENT
Start: 2021-03-16 | End: 2021-09-20 | Stop reason: ALTCHOICE

## 2021-03-16 RX ORDER — AMLODIPINE BESYLATE 10 MG/1
TABLET ORAL
Qty: 90 TABLET | Refills: 1 | Status: SHIPPED | OUTPATIENT
Start: 2021-03-16 | End: 2021-11-16

## 2021-03-16 RX ORDER — LEVOFLOXACIN 500 MG/1
500 TABLET, FILM COATED ORAL DAILY
Qty: 7 TABLET | Refills: 0 | Status: SHIPPED | OUTPATIENT
Start: 2021-03-16 | End: 2021-03-23

## 2021-03-16 RX ORDER — LEVOTHYROXINE SODIUM 0.05 MG/1
TABLET ORAL
Qty: 90 TABLET | Refills: 1 | Status: SHIPPED | OUTPATIENT
Start: 2021-03-16 | End: 2021-12-27 | Stop reason: SDUPTHER

## 2021-03-16 ASSESSMENT — PATIENT HEALTH QUESTIONNAIRE - PHQ9
2. FEELING DOWN, DEPRESSED OR HOPELESS: 0
SUM OF ALL RESPONSES TO PHQ QUESTIONS 1-9: 0
1. LITTLE INTEREST OR PLEASURE IN DOING THINGS: 0
SUM OF ALL RESPONSES TO PHQ9 QUESTIONS 1 & 2: 0
SUM OF ALL RESPONSES TO PHQ QUESTIONS 1-9: 0

## 2021-03-16 NOTE — PROGRESS NOTES
Subjective:      Patient ID: Vashti Pickett is a 63 y.o. male. CC: Patient presents for re-evaluation of chronic health problems including respiratory infection, anemia, atrial fibrillation, embolization of SMA and toe, hypothyroidism and abnormal CT scan. HPI Patient presents for a one month follow-up on his labs and accompanied by wife. Patient wants to go over lab results and CT lung screen that he had done in August 2020. Patient feels that his strength is slowly improving although he does seem to have the same stamina that he had before. He has to lift a lot of heavy items on his workplace he does not feel he is able to do that at this point of time. He still only taking 1 iron pill daily. He does have a follow-up point with a different cardiologist in the next couple weeks. He was reevaluated with nephrology since last office appointment and they agreed that he still continue to have proteinuria but that his kidney function had improved. Patient states has had this cough and congestion even though he was treated with amoxicillin antibiotic per urgent care center. Patient says he always has some cough but is not as significant as it was in the past.  He was also recently evaluated by vascular surgeon and no change of therapy. He has been compliant with anticoagulation.     Review of Systems     Patient Active Problem List   Diagnosis    Anxiety state    Essential hypertension    Generalized osteoarthrosis, involving multiple sites    Mixed hyperlipidemia    Rosacea    COPD (chronic obstructive pulmonary disease) (HCC)    Hypothyroid    Urinary hesitancy due to benign prostatic hyperplasia    Primary osteoarthritis of right knee    Chronic renal failure, stage 3 (moderate)    Pinguecula    Acute blood loss anemia    Paroxysmal atrial fibrillation [facility])       Outpatient Medications Marked as Taking for the 3/16/21 encounter (Office Visit) with Meghna Cope MD   Medication Sig Dispense Refill    apixaban (ELIQUIS) 5 MG TABS tablet Take 1 tablet by mouth 2 times daily Take 5 mg by mouth 2 times daily 180 tablet 1    metoprolol tartrate (LOPRESSOR) 50 MG tablet Take 1 tablet by mouth 2 times daily 180 tablet 1    saccharomyces boulardii (FLORASTOR) 250 MG capsule Take 1 capsule by mouth daily      ferrous sulfate (IRON 325) 325 (65 Fe) MG tablet Take 1 tablet by mouth 2 times daily (Patient taking differently: Take 325 mg by mouth daily (with breakfast) ) 180 tablet 1    cholestyramine (QUESTRAN) 4 g packet 2 times daily      fluticasone (FLONASE) 50 MCG/ACT nasal spray 2 sprays by Nasal route daily      loperamide (IMODIUM) 2 MG capsule Take 2 mg by mouth 2 times daily      lovastatin (MEVACOR) 10 MG tablet TAKE 1 TABLET NIGHTLY 90 tablet 0    levothyroxine (SYNTHROID) 50 MCG tablet TAKE 1 TABLET DAILY 90 tablet 0    amLODIPine (NORVASC) 10 MG tablet TAKE 1 TABLET DAILY 90 tablet 1    albuterol sulfate HFA (PROAIR HFA) 108 (90 Base) MCG/ACT inhaler Inhale 2 puffs into the lungs every 6 hours as needed for Wheezing 1 Inhaler 1    fluticasone (CUTIVATE) 0.05 % cream Apply topically 2 times daily as needed. 60 g 3       Allergies   Allergen Reactions    Diovan [Valsartan]     Ezetimibe-Simvastatin     Ezetimibe-Simvastatin     Biaxin [Clarithromycin] Rash     itching       Social History     Tobacco Use    Smoking status: Former Smoker     Packs/day: 2.50     Years: 33.00     Pack years: 82.50     Types: Cigarettes     Quit date: 2006     Years since quittin.2    Smokeless tobacco: Never Used    Tobacco comment: smoked for 33 yrs / smoked up to 2.5   Substance Use Topics    Alcohol use:  Yes     Alcohol/week: 0.0 standard drinks     Frequency: Monthly or less     Comment: occasional alcohol use        /60 (Site: Left Upper Arm, Position: Sitting, Cuff Size: Large Adult)   Pulse 60   Temp 97.2 °F (36.2 °C) (Infrared)   Wt 183 lb (83 kg)   SpO2 98%   BMI 26.26 kg/m²       Allergies   Allergen Reactions    Diovan [Valsartan]     Ezetimibe-Simvastatin     Ezetimibe-Simvastatin     Biaxin [Clarithromycin] Rash     itching       Objective:   Physical Exam  Constitutional:       General: He is not in acute distress. Appearance: He is well-developed. HENT:      Right Ear: Tympanic membrane normal.      Left Ear: Tympanic membrane normal.      Nose: Nose normal.      Mouth/Throat:      Pharynx: Uvula midline. Neck:      Musculoskeletal: Neck supple. Vascular: No carotid bruit. Cardiovascular:      Rate and Rhythm: Normal rate and regular rhythm. Pulses:           Dorsalis pedis pulses are 2+ on the right side and 2+ on the left side. Posterior tibial pulses are 2+ on the right side and 2+ on the left side. Heart sounds: Normal heart sounds. No murmur. No friction rub. No gallop. Pulmonary:      Effort: Pulmonary effort is normal.      Breath sounds: Rhonchi present. No decreased breath sounds. Musculoskeletal: Normal range of motion. General: No tenderness. Right lower leg: No edema. Left lower leg: No edema. Lymphadenopathy:      Cervical: No cervical adenopathy. Neurological:      General: No focal deficit present. Mental Status: He is alert and oriented to person, place, and time. Sensory: Sensation is intact. Motor: Motor function is intact. Psychiatric:         Behavior: Behavior is cooperative. Assessment:      Renay Stewart was seen today for 1 month follow-up. Diagnoses and all orders for this visit:    Acute bacterial sinusitis    Pulmonary emphysema, unspecified emphysema type ([address]    Acute blood loss anemia  -     CBC; Future  -     Iron and TIBC; Future    Paroxysmal atrial fibrillation (HCC)    Chronic renal failure, stage 3 (moderate), unspecified whether stage 3a or 3b CKD  -     CBC; Future  -     Basic Metabolic Panel;  Future    Hypothyroidism, unspecified type  -     TSH without Reflex; Future    Screening PSA (prostate specific antigen)  -     PSA screening; Future    Other orders  -     amLODIPine (NORVASC) 10 MG tablet; TAKE 1 TABLET DAILY  -     lovastatin (MEVACOR) 10 MG tablet; TAKE 1 TABLET NIGHTLY  -     levothyroxine (SYNTHROID) 50 MCG tablet; TAKE 1 TABLET DAILY  -     levoFLOXacin (LEVAQUIN) 500 MG tablet; Take 1 tablet by mouth daily for 7 days  -     ferrous sulfate (IRON 325) 325 (65 Fe) MG tablet; Take 1 tablet by mouth 2 times daily            Plan:      Laboratory profile reviewed with patient and wife    He still has anemia and his iron levels are on the low end. I recommended again that he increase his iron pill to twice a day. I independently reviewed the chest CT scan demonstrating calcifications of the cardiac arteries. At this point I recommend that he continue to be a non-smoker, continue with blood pressure and cholesterol management and stay with oral anticoagulation and did not add aspirin therapy at this time. In regards to the atrial fibrillation and long-term anticoagulation need to maintain this. Kidney function studies have improved and I recommend that he maintain current blood pressure management maintain low-salt diet and obviously recommend nephrology care    For the atrial fibrillation certainly anticoagulation and cardiac evaluation    Respiratory at all think he has sinusitis I believe he has acute bronchitis with COPD. We discussed again using antibiotic therapy at this point of time and staying off cigarette smoking    RTC 3 months    Medical decision making of moderate complexity. Please note that this chart was generated using Dragon dictation software. Although every effort was made to ensure the accuracy of this automated transcription, some errors in transcription may have occurred.

## 2021-04-20 ENCOUNTER — OFFICE VISIT (OUTPATIENT)
Dept: CARDIOLOGY CLINIC | Age: 66
End: 2021-04-20
Payer: COMMERCIAL

## 2021-04-20 VITALS
HEART RATE: 68 BPM | SYSTOLIC BLOOD PRESSURE: 152 MMHG | HEIGHT: 70 IN | OXYGEN SATURATION: 97 % | WEIGHT: 191 LBS | DIASTOLIC BLOOD PRESSURE: 78 MMHG | BODY MASS INDEX: 27.35 KG/M2

## 2021-04-20 DIAGNOSIS — I10 ESSENTIAL HYPERTENSION: ICD-10-CM

## 2021-04-20 DIAGNOSIS — I48.0 PAROXYSMAL ATRIAL FIBRILLATION (HCC): Primary | ICD-10-CM

## 2021-04-20 DIAGNOSIS — I73.9 PVD (PERIPHERAL VASCULAR DISEASE) (HCC): ICD-10-CM

## 2021-04-20 DIAGNOSIS — N18.30 CHRONIC RENAL FAILURE, STAGE 3 (MODERATE), UNSPECIFIED WHETHER STAGE 3A OR 3B CKD (HCC): ICD-10-CM

## 2021-04-20 PROCEDURE — 93000 ELECTROCARDIOGRAM COMPLETE: CPT | Performed by: INTERNAL MEDICINE

## 2021-04-20 PROCEDURE — 99205 OFFICE O/P NEW HI 60 MIN: CPT | Performed by: INTERNAL MEDICINE

## 2021-04-20 PROCEDURE — 93270 REMOTE 30 DAY ECG REV/REPORT: CPT | Performed by: INTERNAL MEDICINE

## 2021-04-20 NOTE — PROGRESS NOTES
[address]   Electrophysiology Consultation   Date: 4/20/2021  Reason for Consultation: Afib  Consult Requesting Physician:     CC: Atrial fibrillation  HPI: Deric Sosa is a 63 y.o. male with a PMH of Anemia, CRF, Atrial fibrillation, HTN, hypothyroidism and vascular disease    12/6/2020 was admitted to [facility] with severe abdominal pain. 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 Underwent urgent SMA thrombectomy and exploratory laparotomy. Underwent secondary surgery for small bowel resection. Hospital course further complicated by new onset atrial fibrillation, ecg 12/7/2020 revealed atrial fibrillation with RVR in the setting of acute illness. 1/20/2021 Presented for f/u with  at [facility]. ECG revealed SR. Discussion of cessation of metoprolol and started on Sotalol 80 mg BID. BP was said to not be well controlled. Nik Garces presents to the office as a new patient. He states he believes he had Atrial fibrillation when he was 36years old. He states he was admitted to the hospital at that time and that he had excessive caffeine and sodium prior to that. He had no other episodes that took him to the hospital between age 36 and last year. 6/14/2020 he states he went to [facility] with hypertensive urgency and felt as though he was in afib at that time but no record of atrial fibrillation during that admission. He previously snored at night but once he was in the hospital and lost weight his wife states he has stopped snoring. When he is in atrial fibrillation he feels more anxious and aggravated. Assessment and plan:   -Atrial fibrillation,Paroxysmal     It appears that patient has had atrial fibrillation for a long time. He describes 1 episodes of atrial fibrillation many years ago that led to admission to the hospital.  He also has had occasional fluttering.    -ECG today shows Sinus rhythm   -30 day monitor to obtain Afib burden    -Afib risk factors including age, HTN, obesity, inactivity and RUI were discussed with patient. Risk factor modification recommended. All questions were answered.  -He has history of uncontrolled blood pressure, no alcohol usage but also hx of excessive caffeine use  -Did snore in past but with recent weight loss he has stopped    -We discussed treatment options including antiarrhythmics, rate control with anticoagulation, and ablation.     -We discussed progressive nature of atrial fibrillation. Treatment success decreases when AF becomes persistent and last more than 6 months.      -Antiarrhythmic therapy, side effects, benefits and alternative discussed. Has mild LVH and normal prior stress test.       -Atrial fibrillation ablation procedure was discussed. We discussed the need for repeat procedure. On average patients may need more than one ablation procedure.     -Risks associated with ablation include but not limited to allergic reaction to the medications, pain, bleeding, infection, nerve injury, injury to diaphragm(breathing muscle), pulmonary embolus(blood clot in lungs), deep vein blood clot, pneumothorax, hemothorax, acute renal failure, cardiac perforation,  tamponade, need for emergent surgery (open heart), permanent pacemaker, pulmonary vein stenosis, left atrial to esophageal fistula, stroke, myocardial infarction and death. Difference between atrial fibrillation and atrial flutter discussed and treatment discussed. -ECG 12/7/2020 at [facility] showed Atrial fibrillation RVR during acute illness   -Patient continues metoprolol 50 mg BID      -[id number] Vasc score and anticoagulation discussed. High risk for stroke and thromboembolism. Anticoagulation is recommended. I discussed anticoagulation to decrease the risk of thromboembolic events including stroke. Benefits and alternatives were discussed with patient. Risk of bleeding was discussed.  Patient verbalized Soft. No tenderness. · Musculoskeletal: No tenderness. No edema    · Lymphadenopathy: Has no cervical adenopathy. · Neurological: Alert and oriented. Follows command, No Gross deficit   · Skin: Skin is warm, No rash noted. · Psychiatric: Has a normal behavior     Review of System:  [x] Full ROS obtained and negative except as mentioned in HPI    Prior to Admission medications    Medication Sig Start Date End Date Taking? Authorizing Provider   amLODIPine (NORVASC) 10 MG tablet TAKE 1 TABLET DAILY 3/16/21  Yes Guy Gaona MD   lovastatin (MEVACOR) 10 MG tablet TAKE 1 TABLET NIGHTLY 3/16/21  Yes Guy Gaona MD   levothyroxine (SYNTHROID) 50 MCG tablet TAKE 1 TABLET DAILY 3/16/21  Yes Guy Gaona MD   ferrous sulfate (IRON 325) 325 (65 Fe) MG tablet Take 1 tablet by mouth 2 times daily 3/16/21  Yes Guy Gaona MD   apixaban (ELIQUIS) 5 MG TABS tablet Take 1 tablet by mouth 2 times daily Take 5 mg by mouth 2 times daily 2/5/21  Yes Guy Gaona MD   metoprolol tartrate (LOPRESSOR) 50 MG tablet Take 1 tablet by mouth 2 times daily 2/5/21  Yes Guy Gaona MD   saccharomyces boulardii (FLORASTOR) 250 MG capsule Take 1 capsule by mouth daily 2/5/21  Yes Guy Gaona MD   cholestyramine Odette Nhi) 4 g packet 2 times daily 12/29/20  Yes Historical Provider, MD   fluticasone (FLONASE) 50 MCG/ACT nasal spray 2 sprays by Nasal route daily 12/29/20  Yes Historical Provider, MD   albuterol sulfate HFA (PROAIR HFA) 108 (90 Base) MCG/ACT inhaler Inhale 2 puffs into the lungs every 6 hours as needed for Wheezing 4/23/20  Yes Guy Gaona MD   fluticasone (CUTIVATE) 0.05 % cream Apply topically 2 times daily as needed.  4/26/19  Yes Guy Gaona MD   loperamide (IMODIUM) 2 MG capsule Take 2 mg by mouth 2 times daily 12/29/20   Historical Provider, MD       Past Medical History:   Diagnosis Date    Anxiety state, unspecified     Asthma     Back problem     Chronic decision making performed by me. NOTE: This report was transcribed using voice recognition software. Every effort was made to ensure accuracy, however, inadvertent computerized transcription errors may be present.      Melissa Prabhakar MD, MPH  [facility]   Office: [phone]  Fax: [phone]

## 2021-05-04 ENCOUNTER — OFFICE VISIT (OUTPATIENT)
Dept: PULMONOLOGY | Age: 66
End: 2021-05-04
Payer: COMMERCIAL

## 2021-05-04 VITALS
TEMPERATURE: 97.3 F | BODY MASS INDEX: 27.35 KG/M2 | WEIGHT: 191 LBS | HEIGHT: 70 IN | OXYGEN SATURATION: 99 % | SYSTOLIC BLOOD PRESSURE: 136 MMHG | HEART RATE: 60 BPM | DIASTOLIC BLOOD PRESSURE: 72 MMHG

## 2021-05-04 DIAGNOSIS — Z87.891 PERSONAL HISTORY OF TOBACCO USE: Primary | ICD-10-CM

## 2021-05-04 DIAGNOSIS — J44.9 COPD, MODERATE (HCC): ICD-10-CM

## 2021-05-04 DIAGNOSIS — R91.1 LUNG NODULE: ICD-10-CM

## 2021-05-04 PROCEDURE — 99214 OFFICE O/P EST MOD 30 MIN: CPT | Performed by: INTERNAL MEDICINE

## 2021-05-04 PROCEDURE — G0296 VISIT TO DETERM LDCT ELIG: HCPCS | Performed by: INTERNAL MEDICINE

## 2021-05-04 ASSESSMENT — ENCOUNTER SYMPTOMS
SHORTNESS OF BREATH: 1
BACK PAIN: 0
ABDOMINAL DISTENTION: 0
ANAL BLEEDING: 0
COUGH: 1
SORE THROAT: 0
RHINORRHEA: 0
ABDOMINAL PAIN: 0
STRIDOR: 0
APNEA: 0
CHEST TIGHTNESS: 0
WHEEZING: 0
CHOKING: 0
SINUS PRESSURE: 0
VOICE CHANGE: 0
CONSTIPATION: 0
BLOOD IN STOOL: 0
DIARRHEA: 0

## 2021-05-04 NOTE — PROGRESS NOTES
Chris Puliod    YOB: 1955     Date of Service:  5/4/2021     Chief Complaint   Patient presents with    Shortness of Breath         HPI patient has been accompanied by his wife to our office today. Patient is status post SMA embolus thought to be secondary to atrial fibrillation status post laparotomy/thrombectomy of superior mesenteric artery followed by mechanical thrombectomy of left lower extremity DVT in December 2020 at [facility]. Repeat CTA chest/abdomen done on 4/14 showed a new 8 mm left upper lobe opacity ? Scarlike tissue and hence patient is here for an opinion. Has dyspnea with exertion-particularly on an incline. Some cough with mucoid phlegm. Former smoker.     Allergies   Allergen Reactions    Diovan [Valsartan]     Ezetimibe-Simvastatin     Ezetimibe-Simvastatin     Biaxin [Clarithromycin] Rash     itching     Outpatient Medications Marked as Taking for the 5/4/21 encounter (Office Visit) with Mingo Dupree MD   Medication Sig Dispense Refill    glycopyrrolate-formoterol (BEVESPI AEROSPHERE) 9-4.8 MCG/ACT AERO Inhale 2 puffs into the lungs 2 times daily 1 Inhaler 3    amLODIPine (NORVASC) 10 MG tablet TAKE 1 TABLET DAILY 90 tablet 1    lovastatin (MEVACOR) 10 MG tablet TAKE 1 TABLET NIGHTLY 90 tablet 1    levothyroxine (SYNTHROID) 50 MCG tablet TAKE 1 TABLET DAILY 90 tablet 1    ferrous sulfate (IRON 325) 325 (65 Fe) MG tablet Take 1 tablet by mouth 2 times daily 180 tablet 1    apixaban (ELIQUIS) 5 MG TABS tablet Take 1 tablet by mouth 2 times daily Take 5 mg by mouth 2 times daily 180 tablet 1    metoprolol tartrate (LOPRESSOR) 50 MG tablet Take 1 tablet by mouth 2 times daily 180 tablet 1    saccharomyces boulardii (FLORASTOR) 250 MG capsule Take 1 capsule by mouth daily      cholestyramine (QUESTRAN) 4 g packet 2 times daily      fluticasone (FLONASE) 50 MCG/ACT nasal spray 2 sprays by Nasal route daily      loperamide (IMODIUM) 2 MG capsule Take 2 mg by mouth 2 times daily      albuterol sulfate HFA (PROAIR HFA) 108 (90 Base) MCG/ACT inhaler Inhale 2 puffs into the lungs every 6 hours as needed for Wheezing 1 Inhaler 1    fluticasone (CUTIVATE) 0.05 % cream Apply topically 2 times daily as needed. 60 g 3       Immunization History   Administered Date(s) Administered    Influenza Virus Vaccine 10/21/2010, 10/21/2011, 10/03/2012, 11/28/2014       Past Medical History:   Diagnosis Date    Anxiety state, unspecified     Asthma     Back problem     Chronic airway obstruction, not elsewhere classified     Generalized osteoarthrosis, involving multiple sites     Insomnia, unspecified     Other and unspecified hyperlipidemia     Rosacea     Thyroid disease     Unspecified essential hypertension      Past Surgical History:   Procedure Laterality Date    ARM SURGERY Right 1986    fracture surgery    CLAVICLE EXCISION       Family History   Problem Relation Age of Onset    Emphysema Mother     Stroke Father 76    Cancer Father     Kidney Disease Brother         renal cancer    High Blood Pressure Brother     Prostate Cancer Brother     Prostate Cancer Brother        Review of Systems:  Review of Systems   Constitutional: Negative for activity change, appetite change, fatigue and fever. HENT: Negative for congestion, ear discharge, ear pain, postnasal drip, rhinorrhea, sinus pressure, sneezing, sore throat, tinnitus and voice change. Respiratory: Positive for cough and shortness of breath. Negative for apnea, choking, chest tightness, wheezing and stridor. Cardiovascular: Negative for chest pain, palpitations and leg swelling. Gastrointestinal: Negative for abdominal distention, abdominal pain, anal bleeding, blood in stool, constipation and diarrhea. Musculoskeletal: Negative for arthralgias, back pain and gait problem. Skin: Negative for pallor and rash. Allergic/Immunologic: Negative for environmental allergies. Neurological: Negative for dizziness, tremors, seizures, syncope, speech difficulty, weakness, light-headedness, numbness and headaches. Hematological: Negative for adenopathy. Does not bruise/bleed easily. Psychiatric/Behavioral: Negative for sleep disturbance. Vitals:    05/04/21 1410   BP: 136/72   Pulse: 60   Temp: 97.3 °F (36.3 °C)   SpO2: 99%   Weight: 191 lb (86.6 kg)   Height: 5' 9.5\" (1.765 m)     Patient-Reported Vitals 11/19/2020   Patient-Reported Systolic 494   Patient-Reported Diastolic 90      Body mass index is 27.8 kg/m². Wt Readings from Last 3 Encounters:   05/04/21 191 lb (86.6 kg)   04/20/21 191 lb (86.6 kg)   03/16/21 183 lb (83 kg)     BP Readings from Last 3 Encounters:   05/04/21 136/72   04/20/21 (!) 152/78   03/16/21 120/60         Physical Exam  Constitutional:       General: He is not in acute distress. Appearance: He is well-developed. He is not diaphoretic. HENT:      Mouth/Throat:      Pharynx: No oropharyngeal exudate. Cardiovascular:      Rate and Rhythm: Normal rate and regular rhythm. Heart sounds: Normal heart sounds. No murmur. Pulmonary:      Effort: No respiratory distress. Breath sounds: Normal breath sounds. No wheezing or rales. Chest:      Chest wall: No tenderness. Abdominal:      General: There is no distension. Palpations: There is no mass. Tenderness: There is no abdominal tenderness. There is no guarding or rebound. Musculoskeletal:         General: No swelling, tenderness or deformity. Skin:     Coloration: Skin is not pale. Findings: No erythema or rash. Neurological:      Mental Status: He is alert and oriented to person, place, and time. Cranial Nerves: No cranial nerve deficit. Motor: No abnormal muscle tone.       Coordination: Coordination normal.      Deep Tendon Reflexes: Reflexes normal.             Health Maintenance   Topic Date Due    Hepatitis C screen  Never done    Pneumococcal 0-64 years Vaccine (1 of 1 - PPSV23) Never done    Lipid screen  Never done    HIV screen  Never done    COVID-19 Vaccine (1) Never done    DTaP/Tdap/Td vaccine (1 - Tdap) Never done    Diabetes screen  Never done    Shingles Vaccine (1 of 2) Never done    TSH testing  12/14/2018    Potassium monitoring  12/14/2018    Creatinine monitoring  12/14/2018    Flu vaccine (Season Ended) 09/01/2021    Low dose CT lung screening  04/14/2022    Colon cancer screen colonoscopy  09/28/2028    Hepatitis A vaccine  Aged Out    Hepatitis B vaccine  Aged Out    Hib vaccine  Aged Out    Meningococcal (ACWY) vaccine  Aged Out          Assessment/Plan:     Diagnosis Orders   1. Personal history of tobacco use  IA VISIT TO DISCUSS LUNG CA SCREEN W LDCT    CT Lung Screen (Annual)   2. Moderate COPD  3. Left upper lobe lung nodule    Reviewed patient CT images from [facility] from 4/14 which shows a focal left upper lobe scarlike opacity, which is new in comparison to prior CT in August.  Given his prior history of smoking, would definitely repeat CT imaging, is due for low-dose CT in August which will be ordered today. Currently patient does not have any signs or symptoms of a pulmonary infection, hold off on antibiotics and steroids. Patient states that his dyspnea is at baseline. History of moderate COPD based on prior PFT study done in July 2020-FEV1 of 1.87 L [55% predicted]. Patient states he has stopped using Bevespi inhaler, only uses albuterol as needed. But given his symptoms, patient would benefit from LABA/LAMA, which he would restart again. Return in about 4 months (around 9/4/2021).

## 2021-05-21 PROCEDURE — 93272 ECG/REVIEW INTERPRET ONLY: CPT | Performed by: INTERNAL MEDICINE

## 2021-06-01 RX ORDER — METOPROLOL TARTRATE 50 MG/1
50 TABLET, FILM COATED ORAL 2 TIMES DAILY
Qty: 180 TABLET | Refills: 0 | Status: SHIPPED | OUTPATIENT
Start: 2021-06-01 | End: 2021-06-14 | Stop reason: SDUPTHER

## 2021-06-01 NOTE — TELEPHONE ENCOUNTER
PT is requesting refills on metoprolol tartrate (LOPRESSOR) 50 MG tablet to please be called into [address] [place] [address] [phone]

## 2021-06-14 ENCOUNTER — OFFICE VISIT (OUTPATIENT)
Dept: FAMILY MEDICINE CLINIC | Age: 66
End: 2021-06-14
Payer: COMMERCIAL

## 2021-06-14 VITALS
DIASTOLIC BLOOD PRESSURE: 72 MMHG | TEMPERATURE: 97.5 F | WEIGHT: 183.8 LBS | OXYGEN SATURATION: 97 % | BODY MASS INDEX: 26.75 KG/M2 | HEART RATE: 54 BPM | SYSTOLIC BLOOD PRESSURE: 136 MMHG

## 2021-06-14 DIAGNOSIS — I73.9 PVD (PERIPHERAL VASCULAR DISEASE) (HCC): ICD-10-CM

## 2021-06-14 DIAGNOSIS — I48.0 PAROXYSMAL ATRIAL FIBRILLATION (HCC): ICD-10-CM

## 2021-06-14 DIAGNOSIS — I10 ESSENTIAL HYPERTENSION: ICD-10-CM

## 2021-06-14 DIAGNOSIS — J43.9 PULMONARY EMPHYSEMA, UNSPECIFIED EMPHYSEMA TYPE (HCC): Primary | ICD-10-CM

## 2021-06-14 DIAGNOSIS — D64.9 ANEMIA, UNSPECIFIED TYPE: ICD-10-CM

## 2021-06-14 DIAGNOSIS — N18.30 CHRONIC RENAL FAILURE, STAGE 3 (MODERATE), UNSPECIFIED WHETHER STAGE 3A OR 3B CKD (HCC): ICD-10-CM

## 2021-06-14 DIAGNOSIS — E03.9 ACQUIRED HYPOTHYROIDISM: ICD-10-CM

## 2021-06-14 PROBLEM — D62 ACUTE BLOOD LOSS ANEMIA: Status: RESOLVED | Noted: 2021-01-04 | Resolved: 2021-06-14

## 2021-06-14 PROCEDURE — 99214 OFFICE O/P EST MOD 30 MIN: CPT | Performed by: FAMILY MEDICINE

## 2021-06-14 RX ORDER — PENTOXIFYLLINE 400 MG/1
400 TABLET, EXTENDED RELEASE ORAL 2 TIMES DAILY WITH MEALS
Qty: 60 TABLET | Refills: 3 | Status: SHIPPED
Start: 2021-06-14 | End: 2021-07-06 | Stop reason: SINTOL

## 2021-06-14 RX ORDER — METOPROLOL TARTRATE 50 MG/1
50 TABLET, FILM COATED ORAL 2 TIMES DAILY
Qty: 180 TABLET | Refills: 1 | Status: SHIPPED | OUTPATIENT
Start: 2021-06-14 | End: 2021-12-27 | Stop reason: SDUPTHER

## 2021-06-14 RX ORDER — AZELASTINE 1 MG/ML
1 SPRAY, METERED NASAL DAILY PRN
COMMUNITY
End: 2021-06-14 | Stop reason: DRUGHIGH

## 2021-06-14 RX ORDER — AZELASTINE 1 MG/ML
1 SPRAY, METERED NASAL DAILY
Qty: 1 BOTTLE | Status: SHIPPED | COMMUNITY
Start: 2021-06-14

## 2021-06-14 ASSESSMENT — PATIENT HEALTH QUESTIONNAIRE - PHQ9
SUM OF ALL RESPONSES TO PHQ9 QUESTIONS 1 & 2: 0
2. FEELING DOWN, DEPRESSED OR HOPELESS: 0
1. LITTLE INTEREST OR PLEASURE IN DOING THINGS: 0
SUM OF ALL RESPONSES TO PHQ QUESTIONS 1-9: 0

## 2021-06-14 NOTE — LETTER
[address] [facility] [address]  Phone: [phone]  Fax: [phone]    Hiro Hernandez MD         June 14, 2021     Patient: Pradeep Jesus   YOB: 1955   Date of Visit: 6/14/2021       To Whom It May Concern: It is my medical opinion that Shilpa Harris requires a disability parking placard for the following reasons:  He is restricted by lung disease to the extent that forced expiratory volume for one second when measured by spirometry is less than one liter. Duration of need: permanent    If you have any questions or concerns, please don't hesitate to call.     Sincerely,          Hiro Hernandez MD

## 2021-06-14 NOTE — PATIENT INSTRUCTIONS
Patient Education        Learning About Peripheral Arterial Disease (PAD)  What is peripheral arterial disease? Peripheral arterial disease (PAD) is narrowing or blockage of arteries that causes poor blood flow to your arms and legs. PAD is most common in the legs. The most common cause of PAD is the buildup of plaque on the inside of arteries. Over time, plaque builds up in the walls of the arteries, including those that supply blood to your legs. If you have PAD, you're likely to have plaque in other arteries in your body. This raises your risk of a heart attack and stroke. Medicines and lifestyle changes may lower your risk of heart attack and stroke. They may also help if you have symptoms. In some cases, surgery or other treatment is needed. Peripheral arterial disease is also called peripheral vascular disease. What are the symptoms? Many people who have PAD don't have symptoms. If you have symptoms, they may include a tight, aching, or squeezing pain in your calf, thigh, or buttock. This pain is called intermittent claudication. It usually happens after you have walked a certain distance. The pain goes away if you stop walking. As PAD gets worse, you may have pain in your foot or toe when you aren't walking. Other symptoms may include weak or tired legs. You might have trouble walking or balancing. If PAD gets worse, you may have other symptoms caused by poor blood flow to your legs and feet. These symptoms aren't common. They may include cold or numb feet or toes, sores that are slow to heal, or leg or foot pain when you're at rest.  How can you prevent PAD? · Quit smoking. Quitting smoking is one of the best things you can do to help prevent PAD. If you need help quitting, talk to your doctor about stop-smoking programs and medicines. These can increase your chances of quitting for good. · Stay at a healthy weight.   · Manage other health problems, including diabetes, high blood pressure, and high cholesterol. · Be physically active. Try to do moderate activity at least 2½ hours a week. Or try to do vigorous activity at least 1¼ hours a week. You may want to walk or try other activities, such as running, swimming, cycling, or playing tennis or team sports. · Eat a variety of heart-healthy foods. ? Eat fruits, vegetables, whole grains, beans, and other high-fiber foods. ? Eat lean proteins, such as seafood, lean meats, beans, nuts, and soy products. ? Eat healthy fats, such as canola and olive oil. ? Choose foods that are low in saturated fat and avoid trans fat. ? Limit sodium and alcohol. ? Limit drinks and foods with added sugar. How is PAD treated? Treatment for PAD focuses on relieving symptoms and lowering your risk of heart attack and stroke. Making healthy lifestyle changes can help you lower this risk. · If you smoke, quit. Quitting is the best thing you can do when you have PAD. Medicines and counseling can help you quit for good. · Get regular exercise (if your doctor says it's safe). Try walking, swimming, or biking for at least 30 minutes on most, if not all, days of the week. If you have leg symptoms when you exercise, your doctor might recommend a specialized exercise program that may relieve symptoms. The goal is to be able to walk farther without pain. · Eat heart-healthy foods, such as vegetables, fruits, nuts, beans, fish, and whole grains. Limit foods that have a lot of salt, fat, and sugar. · Stay at a healthy weight. Lose weight if you need to. You may need medicines to help lower your risk of heart attack and stroke. These include medicine to prevent blood clots, improve cholesterol, or lower blood pressure. You also may take a medicine that can help ease pain while you are walking. People who have severe PAD may have bypass surgery or other procedures (such as angioplasty) to restore proper blood flow to the legs.   Follow-up care is a key part of your treatment and safety. Be sure to make and go to all appointments, and call your doctor if you are having problems. It's also a good idea to know your test results and keep a list of the medicines you take. Where can you learn more? Go to https://sylvester.Newsbound. org and sign in to your [employer] account. Enter Z350 in the We Tribute box to learn more about \"Learning About Peripheral Arterial Disease (PAD). \"     If you do not have an account, please click on the \"Sign Up Now\" link. Current as of: August 31, 2020               Content Version: 12.8  © 4516-8890 [employer]. Care instructions adapted under license by [facility] ([facility]). If you have questions about a medical condition or this instruction, always ask your healthcare professional. [address] any warranty or liability for your use of this information. Patient Education        Peripheral Arterial Disease (PAD): Care Instructions  Overview  Peripheral arterial disease (PAD) occurs when the blood vessels (arteries) that supply blood to the legs, belly, pelvis, arms, or neck get narrow or blocked. This reduces blood flow to that area. The legs are affected most often. PAD is often caused by fatty buildup (plaque) in the arteries. This buildup is also called \"hardening\" of the arteries. Your risk of PAD increases if you smoke or have high cholesterol, high blood pressure, diabetes, or a family history of PAD. Many people don't have symptoms. If you do have symptoms, you may have weak or tired legs, difficulty walking or balancing, or pain. If you have pain, you might feel a tight, aching, or squeezing pain in the calf, foot, thigh, or buttock that occurs during exercise.  The pain usually gets worse during exercise and goes away when you rest. If PAD gets worse, you may have symptoms of poor blood flow, such as leg pain when you rest.  Medicines and lifestyle changes may help your symptoms and lower your risk of heart attack and stroke. In some cases, surgery or other treatment is needed. It is important that you follow up with your doctor. Follow-up care is a key part of your treatment and safety. Be sure to make and go to all appointments, and call your doctor if you are having problems. It's also a good idea to know your test results and keep a list of the medicines you take. How can you care for yourself at home? · Do not smoke. Smoking can make PAD worse. If you need help quitting, talk to your doctor about stop-smoking programs and medicines. These can increase your chances of quitting for good. · Take your medicines exactly as prescribed. Call your doctor if you think you are having a problem with your medicine. · If you take a blood thinner, such as aspirin, be sure to get instructions about how to take your medicine safely. Blood thinners can cause serious bleeding problems. · Ask your doctor if a cardiac rehab program is right for you. Cardiac rehab can help you make lifestyle changes. In cardiac rehab, a team of health professionals provides education and support to help you make new, healthy habits. · Eat heart-healthy foods such as fruits, vegetables, whole grains, fish, lean meats, and low-fat or nonfat dairy foods. Limit sodium, sugar, and alcohol. · If your doctor recommends it, get more exercise. Walking is a good choice. Bit by bit, increase the amount you walk every day. Try for at least 30 minutes on most days of the week. If you have symptoms when you exercise, ask your doctor about a special exercise program that may help relieve your symptoms. · Stay at a healthy weight. Lose weight if you need to. · Take good care of your feet. ? Treat cuts and scrapes on your legs right away. Poor blood flow prevents (or slows) quick healing of even small cuts or scrapes. This is even more important if you have diabetes. ? Avoid shoes that are too tight or that rub your feet.  Shoes should be comfortable and fit well. ? Avoid socks or stockings that are tight enough to leave elastic-band marks on your legs. Tight socks can make circulation problems worse. ? Keep your feet clean and moisturized to prevent drying and cracking. Place cotton or lamb's wool between your toes to prevent rubbing and to absorb moisture. ? If you have a sore on your leg or foot, keep it dry and cover it with a nonstick bandage until you see your doctor. When should you call for help? Call 911 anytime you think you may need emergency care. For example, call if:    · You have symptoms of a heart attack. These may include:  ? Chest pain or pressure, or a strange feeling in the chest.  ? Sweating. ? Shortness of breath. ? Nausea or vomiting. ? Pain, pressure, or a strange feeling in the back, neck, jaw, or upper belly or in one or both shoulders or arms. ? Lightheadedness or sudden weakness. ? A fast or irregular heartbeat. After you call 911, the  may tell you to chew 1 adult-strength or 2 to 4 low-dose aspirin. Wait for an ambulance. Do not try to drive yourself.    · You have sudden, severe leg pain, and your leg is cool and pale.     · You have symptoms of a stroke. These may include:  ? Sudden numbness, tingling, weakness, or loss of movement in your face, arm, or leg, especially on only one side of your body. ? Sudden vision changes. ? Sudden trouble speaking. ? Sudden confusion or trouble understanding simple statements. ? Sudden problems with walking or balance. ? A sudden, severe headache that is different from past headaches. Call your doctor now or seek immediate medical care if:    · You have leg pain that does not go away even if you rest.     · Your leg pain changes or gets worse.  For example, if you have more pain with normal activity or the same pain with decreased activity, you should call.     · You have cold or numb feet or toes.     · You have leg or foot sores that are slow to heal.   · The skin on your legs or feet changes color.     · You have an open sore on your leg or foot that is infected. Signs of infection include:  ? Increased pain, swelling, warmth, or redness. ? Red streaks leading from the sore. ? Pus draining from the sore. ? A fever. Watch closely for changes in your health, and be sure to contact your doctor if you have any problems. Where can you learn more? Go to https://Big HealthpeLexicon Pharmaceuticalseb."[employer]. org and sign in to your [employer] account. Enter [phone] in the Grouper box to learn more about \"Peripheral Arterial Disease (PAD): Care Instructions. \"     If you do not have an account, please click on the \"Sign Up Now\" link. Current as of: August 31, 2020               Content Version: 12.8  © 0272-6287 [employer]. Care instructions adapted under license by [facility] ([facility]). If you have questions about a medical condition or this instruction, always ask your healthcare professional. [place] [address] any warranty or liability for your use of this information.

## 2021-06-14 NOTE — PROGRESS NOTES
Subjective:      Patient ID: Pradeep Jesus is a 63 y.o. male. CC: Patient presents for re-evaluation of chronic health problems including chronic renal failure, hypertension, paroxysmal atrial fibrillation, peripheral vascular disease and emphysema. HPI Patient presents today for a follow-up on chronic medications and medical conditions in accompaniment of wife. He has been having some pain on the right side of his abdomen which seems a little better. Patient states he was working in the yard and after that developed some lower right back pain which radiated to the abdomen. This is happened for about 2 weeks and finally went away. Wife was concerned as possibly gallbladder related. He denies any issues with eating. He had lost 9 lbs in about a 2 week period. Patient states his weight was up 191 pounds and he went back to 183 pounds. His last weight in our office was 183 pounds and it is similar today. He is having claudication symptoms in his legs where he can walk for about 20 minutes before he develops significant claudication symptoms. He just had recent vascular evaluation with having a CT scan that demonstrated mild to moderate stenosis of both lower extremities. He also had stenosis of the celiac artery. CT scan also demonstrated some abnormalities in the left upper chest and he was evaluated by pulmonology for this and thought this was more scarring and a repeat CT scan will be ordered in the future. He does find his breathing restriction quite a bit. He was placed on a Bevespi inhaler but has not been using this and only using his rescue inhaler. He apparently ran out of his iron medication several weeks ago. He states he still does not have the same energy and strength that he had before he became ill.     Review of Systems      Patient Active Problem List   Diagnosis    Anxiety state    Essential hypertension    Generalized osteoarthrosis, involving multiple sites    Mixed hyperlipidemia    Rosacea    COPD [facility]chronic obstructive pulmonary disease) [facility]    Hypothyroid    Urinary hesitancy due to benign prostatic hyperplasia    Primary osteoarthritis of right knee    Chronic renal failure, stage 3 (moderate) ([facility]    Pinguecula    Acute blood loss anemia    Paroxysmal atrial fibrillation ([facility]    PVD (peripheral vascular disease) ([facility] [address]       Outpatient Medications Marked as Taking for the 6/14/21 encounter (Office Visit) with Micky Simmons MD   Medication Sig Dispense Refill    azelastine (ASTELIN) 0.1 % nasal spray 1 spray by Nasal route daily as needed for Rhinitis Use in each nostril as directed      metoprolol tartrate (LOPRESSOR) 50 MG tablet Take 1 tablet by mouth 2 times daily 180 tablet 0    glycopyrrolate-formoterol (BEVESPI AEROSPHERE) 9-4.8 MCG/ACT AERO Inhale 2 puffs into the lungs 2 times daily 1 Inhaler 3    amLODIPine (NORVASC) 10 MG tablet TAKE 1 TABLET DAILY 90 tablet 1    lovastatin (MEVACOR) 10 MG tablet TAKE 1 TABLET NIGHTLY 90 tablet 1    levothyroxine (SYNTHROID) 50 MCG tablet TAKE 1 TABLET DAILY 90 tablet 1    ferrous sulfate (IRON 325) 325 (65 Fe) MG tablet Take 1 tablet by mouth 2 times daily 180 tablet 1    apixaban (ELIQUIS) 5 MG TABS tablet Take 1 tablet by mouth 2 times daily Take 5 mg by mouth 2 times daily 180 tablet 1    fluticasone (FLONASE) 50 MCG/ACT nasal spray 2 sprays by Nasal route daily      loperamide (IMODIUM) 2 MG capsule Take 2 mg by mouth 2 times daily as needed for Diarrhea       albuterol sulfate HFA (PROAIR HFA) 108 (90 Base) MCG/ACT inhaler Inhale 2 puffs into the lungs every 6 hours as needed for Wheezing 1 Inhaler 1    fluticasone (CUTIVATE) 0.05 % cream Apply topically 2 times daily as needed.  60 g 3       Allergies   Allergen Reactions    Diovan [Valsartan]     Ezetimibe-Simvastatin     Ezetimibe-Simvastatin     Biaxin [Clarithromycin] Rash     itching       Social History     Tobacco Use    Smoking status: Former Smoker     Packs/day: 2.50     Years: 33.00     Pack years: 82.50     Types: Cigarettes     Quit date: 2006     Years since quittin.5    Smokeless tobacco: Never Used    Tobacco comment: smoked for 33 yrs / smoked up to 2.5   Substance Use Topics    Alcohol use: Yes     Alcohol/week: 0.0 standard drinks     Comment: occasional alcohol use        /72 (Site: Right Upper Arm, Position: Sitting, Cuff Size: Medium Adult)   Pulse 54   Temp 97.5 °F (36.4 °C) (Infrared)   Wt 183 lb 12.8 oz (83.4 kg)   SpO2 97%   BMI 26.75 kg/m²     Objective:   Physical Exam  Constitutional:       General: He is not in acute distress. Appearance: Normal appearance. He is well-developed. Neck:      Vascular: No carotid bruit. Cardiovascular:      Rate and Rhythm: Normal rate and regular rhythm. Pulses:           Dorsalis pedis pulses are 2+ on the right side and 2+ on the left side. Posterior tibial pulses are 2+ on the right side and 2+ on the left side. Heart sounds: Normal heart sounds. No murmur heard. No friction rub. No gallop. Pulmonary:      Effort: Pulmonary effort is normal.      Breath sounds: Normal breath sounds. Abdominal:      General: Bowel sounds are normal. There is no distension. Palpations: Abdomen is soft. Abdomen is not rigid. There is no hepatomegaly, splenomegaly or mass. Tenderness: There is no abdominal tenderness. There is no right CVA tenderness, left CVA tenderness, guarding or rebound. Hernia: No hernia is present. Musculoskeletal:         General: No tenderness. Normal range of motion. Right lower leg: No edema. Left lower leg: No edema. Skin:     General: Skin is warm. Findings: No rash (ity.). Neurological:      Mental Status: He is alert and oriented to person, place, and time. Mental status is at baseline. Sensory: Sensation is intact. Motor: Motor function is intact.    Psychiatric: Behavior: Behavior is cooperative. Assessment:      Michelle Lopez was seen today for 3 month follow-up. Diagnoses and all orders for this visit:    Pulmonary emphysema, unspecified emphysema type ([facility] [address]    PVD (peripheral vascular disease) ([facility] [address]    Paroxysmal atrial fibrillation (H[facility])    Acquired hypothyroidism    Chronic renal failure, stage 3 (moderate), unspecified whether stage 3a or 3b CKD ([facility] [address]  -     CBC; Future  -     Comprehensive Metabolic Panel; Future    Anemia, unspecified type  -     Iron and TIBC; Future    Essential hypertension    Other orders  -     metoprolol tartrate (LOPRESSOR) 50 MG tablet; Take 1 tablet by mouth 2 times daily  -     pentoxifylline (TRENTAL) 400 MG extended release tablet; Take 1 tablet by mouth 2 times daily (with meals)            Plan:      Laboratory profile reviewed with patient and wife. Anemia has resolved at this point of time and advised him he can stay off the iron therapy. For the chronic renal failure maintain a low-salt diet and nephrotoxic medications. He is also drink 6 to 8 glasses of fluid a day. Continue with cardiology care and vascular care    For the peripheral arterial disease he is in agreement this point time start Trental medication. Begin a trial of this medication over the next 3 months. For the COPD certainly recommended that he start using the Bevespi medication regularly and he still may use the albuterol inhaler on a as needed basis. RTC 3 months    Medical decision making of moderate complexity        Please note that this chart was generated using Dragon dictation software. Although every effort was made to ensure the accuracy of this automated transcription, some errors in transcription may have occurred.

## 2021-07-01 ENCOUNTER — TELEPHONE (OUTPATIENT)
Dept: FAMILY MEDICINE CLINIC | Age: 66
End: 2021-07-01

## 2021-07-01 NOTE — TELEPHONE ENCOUNTER
Wife informed. Routed message to Dr. José Quiet. They are ok for message to await his return on what next steps should be.

## 2021-07-01 NOTE — TELEPHONE ENCOUNTER
Medication:   Requested Prescriptions      No prescriptions requested or ordered in this encounter          Patient Phone Number: [phone] (home) [phone] (work)    Last appt: 6/14/2021   Next appt: 9/20/2021    Last OARRS: No flowsheet data found.   PDMP Monitoring:    Last PDMP [facility] SYSTEM as Reviewed [facility]):  Review User Review Instant Review Result          Preferred Pharmacy:   [address], [address] [place] [address] [phone] Ezequiel Walker [phone] [facility] [address]  Phone: [phone] Fax: [phone]

## 2021-07-01 NOTE — TELEPHONE ENCOUNTER
Patients wife is calling to state that the new medication that the pateint was prescibed is making him worse he isn't feeling any better. He is nausuous, light head, dizzy. Patient wants to know if he can just stop this medication or does he slowly need to stop      pentoxifylline (TRENTAL) 400 MG extended release tablet 60 tablet 3 6/14/2021     Sig - Route:  Take 1 tablet by mouth 2 times daily (with meals) -             Joycelyn in chart        Provider out of the office

## 2021-07-06 NOTE — TELEPHONE ENCOUNTER
This medication was for leg arterial symptoms.   If he is have a lot of side effects that he certainly can stop the medication but he probably should see the vascular surgeon for further evaluation and treatment

## 2021-07-23 ENCOUNTER — TELEPHONE (OUTPATIENT)
Dept: FAMILY MEDICINE CLINIC | Age: 66
End: 2021-07-23

## 2021-07-28 NOTE — TELEPHONE ENCOUNTER
Patient and wife are wondering about his potassium level since its 5.4 because last time it was 4.7. please advise if anything needs to be done.

## 2021-07-28 NOTE — TELEPHONE ENCOUNTER
Since kidney function is stable the elevated potassium is probably secondary to blood drawing. No change in medications or therapy.

## 2021-08-17 ENCOUNTER — HOSPITAL ENCOUNTER (OUTPATIENT)
Dept: CT IMAGING | Age: 66
Discharge: HOME | End: 2021-08-17
Payer: COMMERCIAL

## 2021-08-17 DIAGNOSIS — Z87.891 PERSONAL HISTORY OF TOBACCO USE: ICD-10-CM

## 2021-08-17 PROCEDURE — 71271 CT THORAX LUNG CANCER SCR C-: CPT

## 2021-08-24 ENCOUNTER — OFFICE VISIT (OUTPATIENT)
Dept: PULMONOLOGY | Age: 66
End: 2021-08-24
Payer: COMMERCIAL

## 2021-08-24 VITALS
DIASTOLIC BLOOD PRESSURE: 70 MMHG | HEIGHT: 70 IN | OXYGEN SATURATION: 97 % | HEART RATE: 60 BPM | BODY MASS INDEX: 25.62 KG/M2 | WEIGHT: 179 LBS | SYSTOLIC BLOOD PRESSURE: 128 MMHG

## 2021-08-24 DIAGNOSIS — R91.1 LUNG NODULE: Primary | ICD-10-CM

## 2021-08-24 DIAGNOSIS — J44.1 COPD WITH ACUTE EXACERBATION (HCC): ICD-10-CM

## 2021-08-24 PROCEDURE — 99214 OFFICE O/P EST MOD 30 MIN: CPT | Performed by: INTERNAL MEDICINE

## 2021-08-24 RX ORDER — PREDNISONE 10 MG/1
TABLET ORAL
Qty: 11 TABLET | Refills: 0 | Status: SHIPPED | OUTPATIENT
Start: 2021-08-24 | End: 2021-09-20 | Stop reason: ALTCHOICE

## 2021-08-24 RX ORDER — ALBUTEROL SULFATE 90 UG/1
2 AEROSOL, METERED RESPIRATORY (INHALATION) EVERY 6 HOURS PRN
Qty: 1 INHALER | Refills: 5 | Status: SHIPPED | OUTPATIENT
Start: 2021-08-24

## 2021-08-24 ASSESSMENT — ENCOUNTER SYMPTOMS
STRIDOR: 0
CONSTIPATION: 0
ABDOMINAL PAIN: 0
ABDOMINAL DISTENTION: 0
BACK PAIN: 0
ANAL BLEEDING: 0
SINUS PRESSURE: 0
BLOOD IN STOOL: 0
APNEA: 0
CHEST TIGHTNESS: 0
SHORTNESS OF BREATH: 1
SORE THROAT: 0
CHOKING: 0
RHINORRHEA: 0
DIARRHEA: 0
COUGH: 1
WHEEZING: 0
VOICE CHANGE: 0

## 2021-08-24 NOTE — PROGRESS NOTES
Bob Tajik    YOB: 1955     Date of Service:  8/24/2021     Chief Complaint   Patient presents with    Results     ct chest 08/17/21         HPI patient has been accompanied by his wife to over office today. Wants to know the results of the recently performed low-dose CT from 8/17. Dyspnea with exertion and cough with mucoid phlegm. Denies any wheezing    Allergies   Allergen Reactions    Diovan [Valsartan]     Ezetimibe-Simvastatin     Ezetimibe-Simvastatin     Biaxin [Clarithromycin] Rash     itching     Outpatient Medications Marked as Taking for the 8/24/21 encounter (Office Visit) with Wilfred Schultz MD   Medication Sig Dispense Refill    metoprolol tartrate (LOPRESSOR) 50 MG tablet Take 1 tablet by mouth 2 times daily 180 tablet 1    azelastine (ASTELIN) 0.1 % nasal spray 1 spray by Nasal route daily Use in each nostril as directed 1 Bottle     glycopyrrolate-formoterol (BEVESPI AEROSPHERE) 9-4.8 MCG/ACT AERO Inhale 2 puffs into the lungs 2 times daily 1 Inhaler 3    amLODIPine (NORVASC) 10 MG tablet TAKE 1 TABLET DAILY 90 tablet 1    lovastatin (MEVACOR) 10 MG tablet TAKE 1 TABLET NIGHTLY 90 tablet 1    levothyroxine (SYNTHROID) 50 MCG tablet TAKE 1 TABLET DAILY 90 tablet 1    ferrous sulfate (IRON 325) 325 (65 Fe) MG tablet Take 1 tablet by mouth 2 times daily 180 tablet 1    apixaban (ELIQUIS) 5 MG TABS tablet Take 1 tablet by mouth 2 times daily Take 5 mg by mouth 2 times daily 180 tablet 1    fluticasone (FLONASE) 50 MCG/ACT nasal spray 2 sprays by Nasal route daily      loperamide (IMODIUM) 2 MG capsule Take 2 mg by mouth 2 times daily as needed for Diarrhea       albuterol sulfate HFA (PROAIR HFA) 108 (90 Base) MCG/ACT inhaler Inhale 2 puffs into the lungs every 6 hours as needed for Wheezing 1 Inhaler 1    fluticasone (CUTIVATE) 0.05 % cream Apply topically 2 times daily as needed.  60 g 3       Immunization History   Administered Date(s) Administered  Influenza Virus Vaccine 10/21/2010, 10/21/2011, 10/03/2012, 11/28/2014       Past Medical History:   Diagnosis Date    Anxiety state, unspecified     Asthma     Back problem     Chronic airway obstruction, not elsewhere classified     Generalized osteoarthrosis, involving multiple sites     Insomnia, unspecified     Other and unspecified hyperlipidemia     Rosacea     Thyroid disease     Unspecified essential hypertension      Past Surgical History:   Procedure Laterality Date    ARM SURGERY Right 1986    fracture surgery    CLAVICLE EXCISION       Family History   Problem Relation Age of Onset    Emphysema Mother     Stroke Father 76    Cancer Father     Kidney Disease Brother         renal cancer    High Blood Pressure Brother     Prostate Cancer Brother     Prostate Cancer Brother        Review of Systems:  Review of Systems   Constitutional: Negative for activity change, appetite change, fatigue and fever. HENT: Negative for congestion, ear discharge, ear pain, postnasal drip, rhinorrhea, sinus pressure, sneezing, sore throat, tinnitus and voice change. Respiratory: Positive for cough and shortness of breath. Negative for apnea, choking, chest tightness, wheezing and stridor. Cardiovascular: Negative for chest pain, palpitations and leg swelling. Gastrointestinal: Negative for abdominal distention, abdominal pain, anal bleeding, blood in stool, constipation and diarrhea. Musculoskeletal: Negative for arthralgias, back pain and gait problem. Skin: Negative for pallor and rash. Allergic/Immunologic: Negative for environmental allergies. Neurological: Negative for dizziness, tremors, seizures, syncope, speech difficulty, weakness, light-headedness, numbness and headaches. Hematological: Negative for adenopathy. Does not bruise/bleed easily. Psychiatric/Behavioral: Negative for sleep disturbance.        Vitals:    08/24/21 1418   BP: 128/70   Pulse: 60   SpO2: 97% Weight: 179 lb (81.2 kg)   Height: 5' 9.5\" (1.765 m)     Patient-Reported Vitals 11/19/2020   Patient-Reported Systolic 529   Patient-Reported Diastolic 90      Body mass index is 26.05 kg/m². Wt Readings from Last 3 Encounters:   08/24/21 179 lb (81.2 kg)   06/25/21 179 lb 8 oz (81.4 kg)   06/14/21 183 lb 12.8 oz (83.4 kg)     BP Readings from Last 3 Encounters:   08/24/21 128/70   06/25/21 130/61   06/14/21 136/72         Physical Exam  Constitutional:       General: He is not in acute distress. Appearance: He is well-developed. He is not diaphoretic. HENT:      Mouth/Throat:      Pharynx: No oropharyngeal exudate. Cardiovascular:      Rate and Rhythm: Normal rate and regular rhythm. Heart sounds: Normal heart sounds. No murmur heard. Pulmonary:      Effort: No respiratory distress. Breath sounds: Wheezing present. No rales. Chest:      Chest wall: No tenderness. Abdominal:      General: There is no distension. Palpations: There is no mass. Tenderness: There is no abdominal tenderness. There is no guarding or rebound. Musculoskeletal:         General: No swelling, tenderness or deformity. Skin:     Coloration: Skin is not pale. Findings: No erythema or rash. Neurological:      Mental Status: He is alert and oriented to person, place, and time. Cranial Nerves: No cranial nerve deficit. Motor: No abnormal muscle tone.       Coordination: Coordination normal.      Deep Tendon Reflexes: Reflexes normal.             Health Maintenance   Topic Date Due    Hepatitis C screen  Never done    Pneumococcal 0-64 years Vaccine (1 of 2 - PPSV23) Never done    Lipid screen  Never done    COVID-19 Vaccine (1) Never done    HIV screen  Never done    Diabetes screen  Never done    TSH testing  12/14/2018    Potassium monitoring  12/14/2018    Creatinine monitoring  12/14/2018    DTaP/Tdap/Td vaccine (1 - Tdap) 06/14/2022 (Originally 10/27/1974)    Shingles Vaccine (1 of 2) 06/14/2022 (Originally 10/27/2007)    Flu vaccine (1) 09/01/2021    Low dose CT lung screening  08/17/2022    Colon cancer screen colonoscopy  09/28/2028    Hepatitis A vaccine  Aged Out    Hepatitis B vaccine  Aged Out    Hib vaccine  Aged Out    Meningococcal (ACWY) vaccine  Aged Out          Assessment/Plan: Moderate COPD with mild exacerbation  Left upper lobe lung nodule    Reviewed low-dose CT performed on 8/17-stable appearance of 8 mm left upper lobe lung nodule in comparison to prior incidental finding of the same from April 2021 from [facility].  However, the nodule is new in comparison to prior LDCT in August 2020. Other lung nodules, small and benign in appearance. Overall, the impression is of a benign process-patient states that he had symptoms suggestive of \"pneumonia\" last winter. Will repeat LDCT in 6 months to confirm stability, and if stable would revert back to annual LDCT. I reassured the patient and wife about the results of the recently performed CT scan. Patient currently appears to have mild exacerbation of COPD, will prescribe 1 week course of prednisone. Patient is also currently on Bevespi and albuterol inhaler as needed. Moderate obstructive airway disease from prior PFT done in July 2020, FEV1 of 1.87 L [55% predicted].     Follow-up in 6 months

## 2021-09-20 ENCOUNTER — OFFICE VISIT (OUTPATIENT)
Dept: FAMILY MEDICINE CLINIC | Age: 66
End: 2021-09-20
Payer: COMMERCIAL

## 2021-09-20 VITALS
WEIGHT: 194 LBS | OXYGEN SATURATION: 98 % | SYSTOLIC BLOOD PRESSURE: 110 MMHG | BODY MASS INDEX: 28.24 KG/M2 | DIASTOLIC BLOOD PRESSURE: 70 MMHG | HEART RATE: 61 BPM | TEMPERATURE: 97.1 F

## 2021-09-20 DIAGNOSIS — E03.9 ACQUIRED HYPOTHYROIDISM: ICD-10-CM

## 2021-09-20 DIAGNOSIS — N18.30 CHRONIC RENAL FAILURE, STAGE 3 (MODERATE), UNSPECIFIED WHETHER STAGE 3A OR 3B CKD (HCC): ICD-10-CM

## 2021-09-20 DIAGNOSIS — E78.2 MIXED HYPERLIPIDEMIA: ICD-10-CM

## 2021-09-20 DIAGNOSIS — I48.0 PAROXYSMAL ATRIAL FIBRILLATION (HCC): ICD-10-CM

## 2021-09-20 DIAGNOSIS — I73.9 PVD (PERIPHERAL VASCULAR DISEASE) (HCC): Primary | ICD-10-CM

## 2021-09-20 DIAGNOSIS — M15.9 GENERALIZED OSTEOARTHROSIS, INVOLVING MULTIPLE SITES: ICD-10-CM

## 2021-09-20 PROCEDURE — 99214 OFFICE O/P EST MOD 30 MIN: CPT | Performed by: FAMILY MEDICINE

## 2021-09-20 ASSESSMENT — PATIENT HEALTH QUESTIONNAIRE - PHQ9
2. FEELING DOWN, DEPRESSED OR HOPELESS: 1
SUM OF ALL RESPONSES TO PHQ9 QUESTIONS 1 & 2: 1
1. LITTLE INTEREST OR PLEASURE IN DOING THINGS: 0
SUM OF ALL RESPONSES TO PHQ QUESTIONS 1-9: 1

## 2021-09-20 NOTE — PROGRESS NOTES
Subjective:      Patient ID: Meghan Armstrong is a 63 y.o. male. CC: Patient presents for re-evaluation of chronic health problems including peripheral vascular disease, atrial fibrillation, osteoarthritis, chronic renal failure and hypothyroidism. Dewayne Beyer [facility] Patient presents today for a follow-up on chronic medications and medical conditions. Patient has noticed a major increase in his appetite the last 2 weeks. He has been eating more than normal.  He apparently only took the Pletal medication for several days and started some GI upset associated with this and therefore discontinued medication. He has been able to ride his bike although he typically does this once a week and he has significant leg symptoms associated with that. He has not been reevaluated by vascular surgery and plans to make appointment for October. Patient never did follow through regards to cardiology and the cardiac monitoring. I reviewed the loop monitoring with patient demonstrated multiple PACs. He should maintain long-term anticoagulation. He feels his arthritis is causing more and more issues with a lot of joint stiffness and soreness. He has been reevaluated by nephrology and no change of medical management at this time. Pulmonology reevaluated patient and the thought would be to repeat the CT scan in the next 4 months. Maintain Bevespi medication and as needed albuterol inhaler. Patient has not had his Covid vaccinations.     Review of Systems     Patient Active Problem List   Diagnosis    Anxiety state    Essential hypertension    Generalized osteoarthrosis, involving multiple sites    Mixed hyperlipidemia    Rosacea    COPD [facility]chronic obstructive pulmonary disease) ([facility] [address]    Hypothyroid    Urinary hesitancy due to benign prostatic hyperplasia    Primary osteoarthritis of right knee    Chronic renal failure, stage 3 (moderate) (HCC)    Pinguecula    Paroxysmal atrial fibrillation (HCC)    PVD (peripheral vascular disease) [facility])       Outpatient Medications Marked as Taking for the 21 encounter (Office Visit) with Tawanda Keita MD   Medication Sig Dispense Refill    albuterol sulfate HFA (PROAIR HFA) 108 (90 Base) MCG/ACT inhaler Inhale 2 puffs into the lungs every 6 hours as needed for Wheezing 1 Inhaler 5    metoprolol tartrate (LOPRESSOR) 50 MG tablet Take 1 tablet by mouth 2 times daily 180 tablet 1    azelastine (ASTELIN) 0.1 % nasal spray 1 spray by Nasal route daily Use in each nostril as directed 1 Bottle     glycopyrrolate-formoterol (BEVESPI AEROSPHERE) 9-4.8 MCG/ACT AERO Inhale 2 puffs into the lungs 2 times daily 1 Inhaler 3    amLODIPine (NORVASC) 10 MG tablet TAKE 1 TABLET DAILY 90 tablet 1    lovastatin (MEVACOR) 10 MG tablet TAKE 1 TABLET NIGHTLY 90 tablet 1    levothyroxine (SYNTHROID) 50 MCG tablet TAKE 1 TABLET DAILY 90 tablet 1    apixaban (ELIQUIS) 5 MG TABS tablet Take 1 tablet by mouth 2 times daily Take 5 mg by mouth 2 times daily 180 tablet 1    fluticasone (FLONASE) 50 MCG/ACT nasal spray 2 sprays by Nasal route daily      loperamide (IMODIUM) 2 MG capsule Take 2 mg by mouth 2 times daily as needed for Diarrhea       fluticasone (CUTIVATE) 0.05 % cream Apply topically 2 times daily as needed. 60 g 3       Allergies   Allergen Reactions    Diovan [Valsartan]     Ezetimibe-Simvastatin     Ezetimibe-Simvastatin     Biaxin [Clarithromycin] Rash     itching       Social History     Tobacco Use    Smoking status: Former Smoker     Packs/day: 2.50     Years: 33.00     Pack years: 82.50     Types: Cigarettes     Quit date: 2006     Years since quittin.8    Smokeless tobacco: Never Used    Tobacco comment: smoked for 33 yrs / smoked up to 2.5   Substance Use Topics    Alcohol use:  Yes     Alcohol/week: 0.0 standard drinks     Comment: occasional alcohol use        /70 (Site: Left Upper Arm, Position: Sitting, Cuff Size: Medium Adult)   Pulse 61   Temp 97.1 °F (36.2 °C) (Infrared)   Wt 194 lb (88 kg)   SpO2 98%   BMI 28.24 kg/m²     Patient Active Problem List   Diagnosis    Anxiety state    Essential hypertension    Generalized osteoarthrosis, involving multiple sites    Mixed hyperlipidemia    Rosacea    COPD (chronic obstructive pulmonary disease) ([facility] [address]    Hypothyroid    Urinary hesitancy due to benign prostatic hyperplasia    Primary osteoarthritis of right knee    Chronic renal failure, stage 3 (moderate) (HCC)    Pinguecula    Paroxysmal atrial fibrillation (HCC)    PVD (peripheral vascular disease) ([facility] [address]       Outpatient Medications Marked as Taking for the 9/20/21 encounter (Office Visit) with Zaire Murray MD   Medication Sig Dispense Refill    albuterol sulfate HFA (PROAIR HFA) 108 (90 Base) MCG/ACT inhaler Inhale 2 puffs into the lungs every 6 hours as needed for Wheezing 1 Inhaler 5    predniSONE (DELTASONE) 10 MG tablet Take 2 tablets daily for 4 days and then 1 tablet daily for 3 days 11 tablet 0    metoprolol tartrate (LOPRESSOR) 50 MG tablet Take 1 tablet by mouth 2 times daily 180 tablet 1    azelastine (ASTELIN) 0.1 % nasal spray 1 spray by Nasal route daily Use in each nostril as directed 1 Bottle     glycopyrrolate-formoterol (BEVESPI AEROSPHERE) 9-4.8 MCG/ACT AERO Inhale 2 puffs into the lungs 2 times daily 1 Inhaler 3    amLODIPine (NORVASC) 10 MG tablet TAKE 1 TABLET DAILY 90 tablet 1    lovastatin (MEVACOR) 10 MG tablet TAKE 1 TABLET NIGHTLY 90 tablet 1    levothyroxine (SYNTHROID) 50 MCG tablet TAKE 1 TABLET DAILY 90 tablet 1    ferrous sulfate (IRON 325) 325 (65 Fe) MG tablet Take 1 tablet by mouth 2 times daily 180 tablet 1    apixaban (ELIQUIS) 5 MG TABS tablet Take 1 tablet by mouth 2 times daily Take 5 mg by mouth 2 times daily 180 tablet 1    fluticasone (FLONASE) 50 MCG/ACT nasal spray 2 sprays by Nasal route daily      loperamide (IMODIUM) 2 MG capsule Take 2 mg by mouth 2 times daily as needed for Diarrhea  fluticasone (CUTIVATE) 0.05 % cream Apply topically 2 times daily as needed. 60 g 3       Allergies   Allergen Reactions    Diovan [Valsartan]     Ezetimibe-Simvastatin     Ezetimibe-Simvastatin     Biaxin [Clarithromycin] Rash     itching       Social History     Tobacco Use    Smoking status: Former Smoker     Packs/day: 2.50     Years: 33.00     Pack years: 82.50     Types: Cigarettes     Quit date: 2006     Years since quittin.8    Smokeless tobacco: Never Used    Tobacco comment: smoked for 33 yrs / smoked up to 2.5   Substance Use Topics    Alcohol use: Yes     Alcohol/week: 0.0 standard drinks     Comment: occasional alcohol use        /70 (Site: Left Upper Arm, Position: Sitting, Cuff Size: Medium Adult)   Pulse 61   Temp 97.1 °F (36.2 °C) (Infrared)   Wt 194 lb (88 kg)   SpO2 98%   BMI 28.24 kg/m²       Objective:   Physical Exam  Constitutional:       General: He is not in acute distress. Appearance: He is well-developed. Neck:      Vascular: No carotid bruit. Cardiovascular:      Rate and Rhythm: Normal rate and regular rhythm. Pulses:           Dorsalis pedis pulses are 2+ on the right side and 2+ on the left side. Posterior tibial pulses are 2+ on the right side and 2+ on the left side. Heart sounds: Normal heart sounds. No murmur heard. No friction rub. No gallop. Pulmonary:      Effort: Pulmonary effort is normal.      Breath sounds: Normal breath sounds. Musculoskeletal:         General: Normal range of motion. Right lower leg: No edema. Left lower leg: No edema. Neurological:      Mental Status: He is alert and oriented to person, place, and time. Sensory: Sensation is intact. Motor: Motor function is intact. Psychiatric:         Behavior: Behavior is cooperative. Assessment:      Alannah Martinez was seen today for 3 month follow-up and hypertension.     Diagnoses and all orders for this visit:    CLAIR

## 2021-09-21 ENCOUNTER — APPOINTMENT (RX ONLY)
Dept: URBAN - METROPOLITAN AREA CLINIC 170 | Facility: CLINIC | Age: 66
Setting detail: DERMATOLOGY
End: 2021-09-21

## 2021-09-21 DIAGNOSIS — D18.0 HEMANGIOMA: ICD-10-CM | Status: STABLE

## 2021-09-21 DIAGNOSIS — D22 MELANOCYTIC NEVI: ICD-10-CM | Status: STABLE

## 2021-09-21 DIAGNOSIS — L82.1 OTHER SEBORRHEIC KERATOSIS: ICD-10-CM | Status: STABLE

## 2021-09-21 DIAGNOSIS — L81.4 OTHER MELANIN HYPERPIGMENTATION: ICD-10-CM | Status: STABLE

## 2021-09-21 DIAGNOSIS — L71.8 OTHER ROSACEA: ICD-10-CM

## 2021-09-21 DIAGNOSIS — L57.0 ACTINIC KERATOSIS: ICD-10-CM

## 2021-09-21 PROBLEM — D22.5 MELANOCYTIC NEVI OF TRUNK: Status: ACTIVE | Noted: 2021-09-21

## 2021-09-21 PROBLEM — D18.01 HEMANGIOMA OF SKIN AND SUBCUTANEOUS TISSUE: Status: ACTIVE | Noted: 2021-09-21

## 2021-09-21 PROCEDURE — 17000 DESTRUCT PREMALG LESION: CPT

## 2021-09-21 PROCEDURE — ? LIQUID NITROGEN

## 2021-09-21 PROCEDURE — 99203 OFFICE O/P NEW LOW 30 MIN: CPT | Mod: 25

## 2021-09-21 PROCEDURE — ? TREATMENT REGIMEN

## 2021-09-21 PROCEDURE — 17003 DESTRUCT PREMALG LES 2-14: CPT

## 2021-09-21 PROCEDURE — ? COUNSELING

## 2021-09-21 PROCEDURE — ? ADDITIONAL NOTES

## 2021-09-21 ASSESSMENT — LOCATION DETAILED DESCRIPTION DERM
LOCATION DETAILED: RIGHT ANTERIOR PROXIMAL UPPER ARM
LOCATION DETAILED: RIGHT INFERIOR UPPER BACK
LOCATION DETAILED: EPIGASTRIC SKIN
LOCATION DETAILED: RIGHT ANTERIOR DISTAL THIGH
LOCATION DETAILED: LEFT SUPERIOR UPPER BACK
LOCATION DETAILED: PERIUMBILICAL SKIN
LOCATION DETAILED: RIGHT CENTRAL MALAR CHEEK
LOCATION DETAILED: LEFT SUPERIOR MEDIAL MIDBACK
LOCATION DETAILED: LEFT VENTRAL PROXIMAL FOREARM
LOCATION DETAILED: LEFT CENTRAL LATERAL NECK
LOCATION DETAILED: LEFT CENTRAL PARIETAL SCALP
LOCATION DETAILED: LEFT CENTRAL MALAR CHEEK

## 2021-09-21 ASSESSMENT — LOCATION ZONE DERM
LOCATION ZONE: TRUNK
LOCATION ZONE: LEG
LOCATION ZONE: ARM
LOCATION ZONE: FACE
LOCATION ZONE: SCALP
LOCATION ZONE: NECK

## 2021-09-21 ASSESSMENT — LOCATION SIMPLE DESCRIPTION DERM
LOCATION SIMPLE: RIGHT THIGH
LOCATION SIMPLE: SCALP
LOCATION SIMPLE: ABDOMEN
LOCATION SIMPLE: RIGHT UPPER BACK
LOCATION SIMPLE: LEFT CHEEK
LOCATION SIMPLE: RIGHT UPPER ARM
LOCATION SIMPLE: LEFT UPPER BACK
LOCATION SIMPLE: LEFT LOWER BACK
LOCATION SIMPLE: RIGHT CHEEK
LOCATION SIMPLE: NECK
LOCATION SIMPLE: LEFT FOREARM

## 2021-09-21 NOTE — HPI: EVALUATION OF SKIN LESION(S)
What Type Of Note Output Would You Prefer (Optional)?: Bullet Format
Hpi Title: Evaluation of Skin Lesions
How Severe Are Your Spot(S)?: mild
Have Your Spot(S) Been Treated In The Past?: has not been treated
Additional History: Check neck spot is irritating. Check left leg brown growth.

## 2021-09-21 NOTE — PROCEDURE: ADDITIONAL NOTES
Detail Level: Simple
Additional Notes: Patient consent was obtained to proceed with the visit and recommended plan of care after discussion of all risks and benefits, including the risks of COVID-19 exposure.
Additional Notes: Patient has medication from other provider to treat rosacea
Render Risk Assessment In Note?: no
Detail Level: Detailed

## 2021-09-21 NOTE — PROCEDURE: LIQUID NITROGEN
Render Post-Care Instructions In Note?: yes
Number Of Freeze-Thaw Cycles: 1 freeze-thaw cycle
Detail Level: Detailed
Post-Care Instructions: I reviewed with the patient in detail post-care instructions. Patient is to wear sunprotection, and avoid picking at any of the treated lesions. Pt may apply Vaseline to crusted or scabbing areas.
Duration Of Freeze Thaw-Cycle (Seconds): 5
Render Note In Bullet Format When Appropriate: No
Consent: The patient's consent was obtained including but not limited to risks of crusting, scabbing, blistering, scarring, darker or lighter pigmentary change, recurrence, incomplete removal and infection.

## 2021-10-26 ENCOUNTER — TELEPHONE (OUTPATIENT)
Dept: CARDIOLOGY CLINIC | Age: 66
End: 2021-10-26

## 2021-10-26 NOTE — TELEPHONE ENCOUNTER
Please let the patient know his Heart monitor was essentially normal. No atrial fibrillation.  If he is stable and having no symptoms we can follow up the first of the year with RMM or can see NP if he feels he is unstable

## 2021-10-26 NOTE — TELEPHONE ENCOUNTER
Patient had appt in July with RMM that was cxld because RMM was out . He was offered an appt with NP but wants to see the doctor since the copay is the same. He has never gotten the result from his heart monitor . Wife asking to make an appt with RMM .  Can he be worked in ?

## 2021-10-26 NOTE — TELEPHONE ENCOUNTER
Spoke with the patients wife and advised her of the message below . She voiced understanding and has been added to the recall list for appt with RMM .  Call complete

## 2021-11-15 NOTE — TELEPHONE ENCOUNTER
Medication:   Requested Prescriptions     Pending Prescriptions Disp Refills    amLODIPine (NORVASC) 10 MG tablet [Pharmacy Med Name: amLODIPine BESYLATE 10 MG TAB] 90 tablet 1     Sig: TAKE ONE TABLET BY MOUTH DAILY      Last Filled:      Patient Phone Number: [phone] (home) [phone] (work)    Last appt: 9/20/2021   Next appt: 1/17/2022    Last OARRS: No flowsheet data found.   PDMP Monitoring:    Last PDMP Yinka Mendes as Reviewed [facility]):  Review User Review Instant Review Result          Preferred Pharmacy:   [facility] [address], [address] [phone] [facility] [phone]  [address] [place] [address]  Phone: [phone] Fax: [phone]  Camila Shane. [facility], [address] [phone] - f [phone]  [address] [place] [address]  Phone: [phone] Fax: [phone]

## 2021-11-16 RX ORDER — AMLODIPINE BESYLATE 10 MG/1
TABLET ORAL
Qty: 90 TABLET | Refills: 1 | Status: SHIPPED | OUTPATIENT
Start: 2021-11-16 | End: 2022-01-17 | Stop reason: SDUPTHER

## 2021-11-29 ENCOUNTER — NURSE TRIAGE (OUTPATIENT)
Dept: OTHER | Facility: CLINIC | Age: 66
End: 2021-11-29

## 2021-11-29 ENCOUNTER — OFFICE VISIT (OUTPATIENT)
Dept: FAMILY MEDICINE CLINIC | Age: 66
End: 2021-11-29
Payer: COMMERCIAL

## 2021-11-29 VITALS — HEART RATE: 63 BPM | OXYGEN SATURATION: 99 % | TEMPERATURE: 98.7 F

## 2021-11-29 DIAGNOSIS — J06.9 URI, ACUTE: ICD-10-CM

## 2021-11-29 DIAGNOSIS — U07.1 COVID: Primary | ICD-10-CM

## 2021-11-29 PROCEDURE — 99213 OFFICE O/P EST LOW 20 MIN: CPT | Performed by: FAMILY MEDICINE

## 2021-11-29 ASSESSMENT — PATIENT HEALTH QUESTIONNAIRE - PHQ9
SUM OF ALL RESPONSES TO PHQ QUESTIONS 1-9: 0
SUM OF ALL RESPONSES TO PHQ QUESTIONS 1-9: 0
1. LITTLE INTEREST OR PLEASURE IN DOING THINGS: 0
2. FEELING DOWN, DEPRESSED OR HOPELESS: 0
SUM OF ALL RESPONSES TO PHQ QUESTIONS 1-9: 0
SUM OF ALL RESPONSES TO PHQ9 QUESTIONS 1 & 2: 0

## 2021-11-29 NOTE — PROGRESS NOTES
Subjective:      Patient ID: Ron Hopper is a 61 y.o. male. HPI patient presents in the company of wife with a 3-day history of respiratory congestion and high fevers. He is also having headaches associated with this. He denies any GI symptoms. No Covid exposure as far as he knows. He has not been vaccinated. Review of Systems  Allergies   Allergen Reactions    Diovan [Valsartan]     Ezetimibe-Simvastatin     Ezetimibe-Simvastatin     Biaxin [Clarithromycin] Rash     itching     Vitals:    11/29/21 1332   Pulse: 63   Temp: 98.7 °F (37.1 °C)   SpO2: 99%       Objective:   Physical Exam  Vitals reviewed. Constitutional:       General: He is not in acute distress. Appearance: He is well-developed. HENT:      Right Ear: Tympanic membrane normal.      Left Ear: Tympanic membrane normal.      Nose: Mucosal edema, congestion and rhinorrhea present. Right Sinus: No maxillary sinus tenderness or frontal sinus tenderness. Left Sinus: No maxillary sinus tenderness or frontal sinus tenderness. Pulmonary:      Effort: Pulmonary effort is normal. No respiratory distress. Breath sounds: Normal breath sounds. Musculoskeletal:      Cervical back: Neck supple. Lymphadenopathy:      Cervical: No cervical adenopathy. Neurological:      Mental Status: He is alert. Psychiatric:         Behavior: Behavior is cooperative. Assessment:      Ant Lucero was seen today for concern for covid-19 and fever. Diagnoses and all orders for this visit:    COVID  -     COVID-19    URI, acute    Other orders  -     COVID-19  -     COVID-19  -     COVID-19            Plan:      Informational handout provided  Continue with symptomatic treatment at this time.   RTC PRN    Medical decision making of low complexity            Caryle Husky, MD

## 2021-11-30 LAB — SARS-COV-2: DETECTED

## 2021-12-08 ENCOUNTER — TELEPHONE (OUTPATIENT)
Dept: FAMILY MEDICINE CLINIC | Age: 66
End: 2021-12-08

## 2021-12-08 RX ORDER — PREDNISONE 20 MG/1
TABLET ORAL
Qty: 15 TABLET | Refills: 0 | Status: SHIPPED | OUTPATIENT
Start: 2021-12-08 | End: 2022-01-03

## 2021-12-08 NOTE — TELEPHONE ENCOUNTER
Chest x-ray did not demonstrate Covid pneumonia. Recommend he start using the albuterol inhaler 2 puffs every 4 hours and start taking Mucinex 600 mg twice daily.

## 2021-12-08 NOTE — TELEPHONE ENCOUNTER
Wife is calling to state she took the patient to the ER last night. They were there for 7 hours. Patient has Covid and is having trouble breathing. Was sent home with prednisone and was told to call your PCP. Patient is still having trouble breathing. O2 is 80      Wife is wanting to know if he needs another RX for prednisone?         Wife would love a phone call return to let her know if meds were called in or what she should do?    [phone] in chart

## 2021-12-08 NOTE — TELEPHONE ENCOUNTER
Patient's wife advised and she states he already has an albuterol inhaler, albuterol nebulizer solution, and Bevespi inhaler. His wife is requesting some Prednisone to take for short term.  Please advise

## 2021-12-27 ENCOUNTER — TELEPHONE (OUTPATIENT)
Dept: CARDIOLOGY CLINIC | Age: 66
End: 2021-12-27

## 2021-12-27 RX ORDER — LEVOTHYROXINE SODIUM 0.05 MG/1
TABLET ORAL
Qty: 90 TABLET | Refills: 0 | Status: SHIPPED | OUTPATIENT
Start: 2021-12-27 | End: 2022-01-17 | Stop reason: SDUPTHER

## 2021-12-27 RX ORDER — LOVASTATIN 10 MG/1
TABLET ORAL
Qty: 90 TABLET | Refills: 0 | Status: SHIPPED | OUTPATIENT
Start: 2021-12-27 | End: 2022-01-17 | Stop reason: SDUPTHER

## 2021-12-27 RX ORDER — METOPROLOL TARTRATE 50 MG/1
50 TABLET, FILM COATED ORAL 2 TIMES DAILY
Qty: 180 TABLET | Refills: 0 | Status: SHIPPED | OUTPATIENT
Start: 2021-12-27 | End: 2022-01-17 | Stop reason: SDUPTHER

## 2021-12-27 NOTE — TELEPHONE ENCOUNTER
metoprolol tartrate (LOPRESSOR) 50 MG tablet 180 tablet 1 6/14/2021     Sig - Route:  Take 1 tablet by mouth 2 times daily - Oral      levothyroxine (SYNTHROID) 50 MCG tablet 90 tablet 1 3/16/2021     Sig: TAKE 1 TABLET DAILY            [facility]

## 2021-12-27 NOTE — TELEPHONE ENCOUNTER
Pt wife called asking for appt to see RMM she stated someone was suppose to call to get scheduled with RMM and no one has called them to schedule, she stated that the pt has never gotten the results of the monitor her wore. Wife was offered appointment Feb to see RMM, wife was very upset they had to wait that long. Pt did not want the appt 2/1 at 7:45AM,  They did take the appt 2/7 at 3pm with RMM, but would like to see RMM sooner. Pt is scheduled for NPSR 1/3, but rather see RMM.    Pls advise thank you

## 2021-12-27 NOTE — PROGRESS NOTES
[address]   Electrophysiology  Marden People, APRN-CNP  Attending EP: Dr. Clark Pressley    Date: 1/3/2022  I had the privilege of visiting Pradeep Jesus in the office. Chief Complaint:   Chief Complaint   Patient presents with    Follow-up    Atrial Fibrillation     History of Present Illness: History obtained from patient and medical record. Pradeep Jesus is 61 y.o. male with a past medical history of HTN, HLD, PAF, PAD, CKD, and hypothyroidism. 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 He underwent surgery for SBO. He was found to have new onset AF. -Interval history: Today, Pradeep Jesus is being seen for routine follow up. His wife is present for the visit. He is doing well. His palpitations were bad a few months ago when his pharmacy began getting his metoprolol from a different supplier. He has a home monitor [facility]) that showed episodes of SVT at the time. He states his symptoms have been resolved since resuming his normal metoprolol. His BP is well controlled today, 118/72. He does not smoke cigarettes. He has leg pain with heavier activity, like mowing the grass. He denies any chest pain with activity. He does occasionally feel SOB and has to rest with certain activity. Denies having chest pain, palpitations, shortness of breath, orthopnea/PND, cough, or dizziness at the time of this visit. With regard to medication therapy the patient has been compliant with prescribed regimen. They have tolerated therapy to date. Allergies: Allergies   Allergen Reactions    Diovan [Valsartan]     Ezetimibe-Simvastatin     Ezetimibe-Simvastatin     Biaxin [Clarithromycin] Rash     itching     Home Medications:  Prior to Visit Medications    Medication Sig Taking?  Authorizing Provider   Coenzyme Q10 (CO Q 10 PO) Take by mouth Yes Historical Provider, MD   Ascorbic Acid (VITAMIN C) 500 MG CAPS Take by mouth Yes Historical Provider, MD b complex vitamins capsule Take 1 capsule by mouth daily Yes Historical Provider, MD   Cholecalciferol (VITAMIN D) 50 MCG (2000 UT) CAPS capsule Take by mouth Yes Historical Provider, MD   metoprolol tartrate (LOPRESSOR) 50 MG tablet Take 1 tablet by mouth 2 times daily Yes Joo Mcneil MD   levothyroxine (SYNTHROID) 50 MCG tablet TAKE 1 TABLET DAILY Yes Joo Mcneil MD   lovastatin (MEVACOR) 10 MG tablet TAKE 1 TABLET NIGHTLY Yes Joo Mcneil MD   amLODIPine (NORVASC) 10 MG tablet TAKE ONE TABLET BY MOUTH DAILY Yes Joo Mcneil MD   albuterol sulfate HFA (PROAIR HFA) 108 (90 Base) MCG/ACT inhaler Inhale 2 puffs into the lungs every 6 hours as needed for Wheezing Yes Ollie Landaverde MD   azelastine (ASTELIN) 0.1 % nasal spray 1 spray by Nasal route daily Use in each nostril as directed Yes Joo Mcneil MD   glycopyrrolate-formoterol (BEVESPI AEROSPHERE) 9-4.8 MCG/ACT AERO Inhale 2 puffs into the lungs 2 times daily Yes Ollie Landaverde MD   apixaban (ELIQUIS) 5 MG TABS tablet Take 1 tablet by mouth 2 times daily Take 5 mg by mouth 2 times daily Yes Joo Mcneil MD   fluticasone (FLONASE) 50 MCG/ACT nasal spray 2 sprays by Nasal route daily Yes Historical Provider, MD   loperamide (IMODIUM) 2 MG capsule Take 2 mg by mouth 2 times daily as needed for Diarrhea  Yes Historical Provider, MD   fluticasone (CUTIVATE) 0.05 % cream Apply topically 2 times daily as needed.  Yes Ge Sports, MD      Past Medical History:  Past Medical History:   Diagnosis Date    Anxiety state, unspecified     Asthma     Back problem     Chronic airway obstruction, not elsewhere classified     Generalized osteoarthrosis, involving multiple sites     Insomnia, unspecified     Other and unspecified hyperlipidemia     Rosacea     Thyroid disease     Unspecified essential hypertension      Past Surgical History:    has a past surgical history that includes Clavicle excision and Arm Surgery (Right, 1986). Social History:  Reviewed. reports that he quit smoking about 15 years ago. His smoking use included cigarettes. He has a 82.50 pack-year smoking history. He has never used smokeless tobacco. He reports current alcohol use. He reports that he does not use drugs. Family History:  Reviewed. family history includes Cancer in his father; Emphysema in his mother; High Blood Pressure in his brother; Kidney Disease in his brother; Prostate Cancer in his brother and brother; Stroke (age of onset: 76) in his father. Review of Systems:  · Constitutional: Negative for fever, night sweats, chills, weight changes, or weakness  · Skin: Negative for rash, dry skin, pruritus, bruising, bleeding, blood clots, or changes in skin pigment  · HEENT: Negative for vision changes, ringing in the ears, sore throat, dysphagia, or swollen lymph nodes  · Respiratory: Reviewed in HPI  · Cardiovascular: Reviewed in HPI  · Gastrointestinal: Negative for abdominal pain, N/V/D, constipation, or black/tarry stools  · Genito-Urinary: Negative for dysuria, incontinence, urgency, or hematuria  · Musculoskeletal: Negative for joint swelling, muscle pain, or injuries  · Neurological/Psych: Negative for confusion, seizures, dizziness, headaches, balance issues or TIA-like symptoms. No anxiety, depression, or insomnia    Physical Examination:  Vitals:    01/03/22 1506   BP: 118/72   Pulse: 76   SpO2: 95%      Wt Readings from Last 3 Encounters:   01/03/22 189 lb 3.2 oz (85.8 kg)   09/20/21 194 lb (88 kg)   08/24/21 179 lb (81.2 kg)     Constitutional: Cooperative and in no apparent distress, and appears well nourished  Skin: Warm and pink; no pallor, cyanosis, bruising, or clubbing  HEENT: Symmetric and normocephalic. PERRL, EOM intact. Conjunctiva pink with clear sclera. Mucus membranes pink and moist. Teeth intact. Thyroid smooth without nodules or goiter  Respiratory: Respirations symmetric and unlabored. Lungs clear to auscultation bilaterally, no wheezing, rhonchi, or crackles  Cardiovascular:  Regular rate and rhythm. S1/S2 present without murmurs, rubs, or gallops. Peripheral pulses 2+, capillary refill < 3 seconds. No elevation of JVP. No peripheral edema  Gastrointestinal: Abdomen soft and round. Bowel sounds normoactive in all quadrants without tenderness or masses. Musculoskeletal: Bilateral upper and lower extremity strength 5/5 with full ROM. Neurological/Psych: Awake and orientated to person, place and time. Calm affect, appropriate mood. Pertinent labs, diagnostic, device, and imaging results reviewed as a part of this visit    LABS    CBC:   Lab Results   Component Value Date    WBC 6.0 2017    HGB 14.6 2017    HCT 43.8 2017    MCV 89 2017     2017     BMP:   Lab Results   Component Value Date    CREATININE 1.12 2017    BUN 17 2017     2017    K 4.7 2017     2017    CO2 25 2017     CrCl cannot be calculated (Patient's most recent lab result is older than the maximum 120 days allowed. ). Thyroid:   Lab Results   Component Value Date    TSH 2.050 2017     Lipid Panel: No results found for: CHOL, HDL, TRIG  LFTs:  Lab Results   Component Value Date    ALT 29 2017    AST 27 2017    ALKPHOS 69 2017    BILITOT 1.0 2017     Coags: No results found for: PROTIME, INR, APTT    EC/3/2022  - NSR. Rate 70, QRS 92, QTc 377    Echo:  [facility])   The left ventricular function is normal.   Overall left ventricular ejection fraction is estimated to be 60-65%. There is moderate concentric left ventricular hypertrophy. The left ventricular wall motion is normal.   Injection of agitated saline showed no interatrial shunt. GXT:  [email or web address])  The LV EF is 66%.  Normal global and regional wall motion in all territories.      o Non-diagnostic ECG for ischemia with pharmocologic stress.      o Negative nuclear stress imaging for inducible ischemia. Event Monitor: 4/21  NSR. Average HR 64, low 50, high 120  No AF/AFL. 1% PAC burden    Assessment:    1. Paroxysmal Atrial Fibrillation  - Currently in NSR  - Continue metoprolol 50 mg BID  - [id number] score:high; [id number] Vasc score and anticoagulation discussed. High risk for stroke and thromboembolism. Anticoagulation is recommended. Risk of bleeding was discussed.  ~ On Eliquis 5 mg BID (needs lifelong for hx of SMA thrombosis)    - Afib risk factors including age, HTN, obesity, inactivity and RUI were discussed with patient. Risk factor modification recommended      - Treatment options including cardioversion, rate control strategy, antiarrhythmics, anticoagulation and possible ablation were discussed with patient. Risks, benefits and alternative of each treatment options were explained. All questions answered    2. HTN  - Controlled: Goal <130/80  - Continue current medications  - Encouraged to monitor and log BP readings at home, then bring log to next visit  - Discussed importance of low sodium diet, weight control and exercise    3. Hx of SMA Embolism   - Hx of SMA thrombectomy (12/20)   - On Eliquis    4. Anemia   - Stable   - On PO iron    5. Hyperlipidemia  - Controlled. Goal: LDL <100   ~ LDL 20 (1/21)  - On lovastatin 10 mg QD  - Discussed diet, weight loss, and exercise needs  - Followed per PCP    6. PAD   - Stable   - On Eliquis, statin   - Followed by vascular surgery    7. CKD   - Stable   - Need routine monitoring    Plan:  1. No changes  2. Call office if palpitations worsen  3.  Activity as tolerared    F/U: Follow-up with NPSR in 6 months  -Call [facility] at [phone] with any questions    Diet & Exercise:   The patient is counseled to follow a low salt diet to assure blood pressure remains controlled for cardiovascular risk factor modification   The patient is counseled to avoid excess caffeine, and energy drinks as this may exacerbated ectopy and arrhythmia   The patient is counseled to lose weight to control cardiovascular risk factors   Exercise program discussed: To improve overall cardiovascular health, the patient is instructed to increase cardiovascular related activities with a goal of 150 min/week of moderate level activity or 10,000 steps per day. Encouraged to perform as much activity as tolerated    I have addressed the patient's cardiac risk factors and adjusted pharmacologic treatment as needed. In addition, I have reinforced the need for patient directed risk factor modification. I independently reviewed the MCOT and ECG    All questions and concerns were addressed with the patient. Alternatives to treatment were discussed. Thank you for allowing to us to participate in the care of Aurelio White    Time  33 minutes spent preparing to see patient including reviewing patient history/prior tests/prior consults, performing a medical exam, counseling and educating patient/family/caregiver, ordering medications/tests/procedures, referring and communicating with PCPs and other pertinent consultants, documenting information in the EMR, independently interpreting results and communicating to family and coordination of patient care.     CHAYITO Singh-CNP  [facility]   Office: [phone]

## 2022-01-03 ENCOUNTER — OFFICE VISIT (OUTPATIENT)
Dept: CARDIOLOGY CLINIC | Age: 67
End: 2022-01-03
Payer: COMMERCIAL

## 2022-01-03 VITALS
SYSTOLIC BLOOD PRESSURE: 118 MMHG | DIASTOLIC BLOOD PRESSURE: 72 MMHG | HEART RATE: 76 BPM | BODY MASS INDEX: 27.09 KG/M2 | OXYGEN SATURATION: 95 % | HEIGHT: 70 IN | WEIGHT: 189.2 LBS

## 2022-01-03 DIAGNOSIS — E78.2 MIXED HYPERLIPIDEMIA: ICD-10-CM

## 2022-01-03 DIAGNOSIS — N18.30 CHRONIC RENAL FAILURE, STAGE 3 (MODERATE), UNSPECIFIED WHETHER STAGE 3A OR 3B CKD (HCC): ICD-10-CM

## 2022-01-03 DIAGNOSIS — I48.0 PAROXYSMAL ATRIAL FIBRILLATION (HCC): Primary | ICD-10-CM

## 2022-01-03 DIAGNOSIS — Z86.718 H/O THROMBOSIS: ICD-10-CM

## 2022-01-03 PROCEDURE — 99214 OFFICE O/P EST MOD 30 MIN: CPT | Performed by: NURSE PRACTITIONER

## 2022-01-03 PROCEDURE — 93000 ELECTROCARDIOGRAM COMPLETE: CPT | Performed by: NURSE PRACTITIONER

## 2022-01-03 RX ORDER — MULTIVIT-MIN/IRON/FOLIC ACID/K 18-600-40
CAPSULE ORAL
COMMUNITY
End: 2022-07-15

## 2022-01-03 RX ORDER — MULTIVIT-MIN/IRON/FOLIC ACID/K 18-600-40
CAPSULE ORAL
COMMUNITY

## 2022-01-03 RX ORDER — VITAMIN B COMPLEX
1 CAPSULE ORAL DAILY
COMMUNITY
End: 2022-07-15

## 2022-01-17 ENCOUNTER — OFFICE VISIT (OUTPATIENT)
Dept: FAMILY MEDICINE CLINIC | Age: 67
End: 2022-01-17
Payer: COMMERCIAL

## 2022-01-17 VITALS
RESPIRATION RATE: 12 BRPM | BODY MASS INDEX: 27.91 KG/M2 | HEART RATE: 74 BPM | SYSTOLIC BLOOD PRESSURE: 118 MMHG | DIASTOLIC BLOOD PRESSURE: 64 MMHG | WEIGHT: 194.5 LBS | TEMPERATURE: 97.7 F

## 2022-01-17 DIAGNOSIS — R79.89 ELEVATED LIVER FUNCTION TESTS: ICD-10-CM

## 2022-01-17 DIAGNOSIS — E78.2 MIXED HYPERLIPIDEMIA: ICD-10-CM

## 2022-01-17 DIAGNOSIS — E03.9 ACQUIRED HYPOTHYROIDISM: ICD-10-CM

## 2022-01-17 DIAGNOSIS — Z86.718 H/O THROMBOSIS: ICD-10-CM

## 2022-01-17 DIAGNOSIS — I48.0 PAROXYSMAL ATRIAL FIBRILLATION (HCC): ICD-10-CM

## 2022-01-17 DIAGNOSIS — N18.30 CHRONIC RENAL FAILURE, STAGE 3 (MODERATE), UNSPECIFIED WHETHER STAGE 3A OR 3B CKD (HCC): Primary | ICD-10-CM

## 2022-01-17 DIAGNOSIS — Z12.5 SCREENING PSA (PROSTATE SPECIFIC ANTIGEN): ICD-10-CM

## 2022-01-17 DIAGNOSIS — I73.9 PVD (PERIPHERAL VASCULAR DISEASE) (HCC): ICD-10-CM

## 2022-01-17 DIAGNOSIS — J43.9 PULMONARY EMPHYSEMA, UNSPECIFIED EMPHYSEMA TYPE (HCC): ICD-10-CM

## 2022-01-17 PROCEDURE — 99214 OFFICE O/P EST MOD 30 MIN: CPT | Performed by: FAMILY MEDICINE

## 2022-01-17 RX ORDER — METOPROLOL TARTRATE 50 MG/1
50 TABLET, FILM COATED ORAL 2 TIMES DAILY
Qty: 180 TABLET | Refills: 1 | Status: SHIPPED | OUTPATIENT
Start: 2022-01-17 | End: 2022-07-15

## 2022-01-17 RX ORDER — AMLODIPINE BESYLATE 10 MG/1
TABLET ORAL
Qty: 90 TABLET | Refills: 1 | Status: SHIPPED | OUTPATIENT
Start: 2022-01-17 | End: 2022-09-30

## 2022-01-17 RX ORDER — LOVASTATIN 10 MG/1
TABLET ORAL
Qty: 90 TABLET | Refills: 1 | Status: SHIPPED | OUTPATIENT
Start: 2022-01-17

## 2022-01-17 RX ORDER — LEVOTHYROXINE SODIUM 0.05 MG/1
TABLET ORAL
Qty: 90 TABLET | Refills: 1 | Status: SHIPPED | OUTPATIENT
Start: 2022-01-17 | End: 2022-07-15 | Stop reason: SDUPTHER

## 2022-01-17 NOTE — PROGRESS NOTES
Subjective:      Patient ID: David Lopez is a 61 y.o. male. CC: Patient presents for re-evaluation of chronic health problems including chronic renal failure, hypothyroidism, emphysema, paroxysmal atrial fibrillation, peripheral vascular disease with a history of thrombosis and hyperlipidemia. Rnoy STEWARD Pt is here for a follow up, med refill, test results in accompaniment of wife. He feels he is doing overall well. He still using a rescue inhaler almost daily. He went off the Bevespi inhaler sometime ago. He states he has had this chronic cough from his cigarette smoking for 30+ years. He has not noticed any atrial fibrillation episodes. He did not make an appointment with the vascular surgeon but plans to in April. That will put about 1 year from his last appointment time. He denies any claudication symptoms. He continues on anticoagulation. Patient is very compliant with medications with no adverse reactions. Patient also feels that he is fully recovered from the COVID infection of 1 month ago.     Review of Systems  Patient Active Problem List   Diagnosis    Anxiety state    Generalized osteoarthrosis, involving multiple sites    Mixed hyperlipidemia    Rosacea    COPD (chronic obstructive pulmonary disease) (HCC)    Hypothyroid    Urinary hesitancy due to benign prostatic hyperplasia    Primary osteoarthritis of right knee    Chronic renal failure, stage 3 (moderate) (HCC)    Pinguecula    Paroxysmal atrial fibrillation (HCC)    [facility]    H/O thrombosis       Outpatient Medications Marked as Taking for the 1/17/22 encounter (Office Visit) with Michael Quesada MD   Medication Sig Dispense Refill    Coenzyme Q10 (CO Q 10 PO) Take by mouth      Ascorbic Acid (VITAMIN C) 500 MG CAPS Take by mouth      b complex vitamins capsule Take 1 capsule by mouth daily      Cholecalciferol (VITAMIN D) 50 MCG (2000 UT) CAPS capsule Take by mouth      apixaban (ELIQUIS) 5 MG TABS tablet Take 1 tablet by mouth 2 times daily Take 5 mg by mouth 2 times daily 180 tablet 3    metoprolol tartrate (LOPRESSOR) 50 MG tablet Take 1 tablet by mouth 2 times daily 180 tablet 0    levothyroxine (SYNTHROID) 50 MCG tablet TAKE 1 TABLET DAILY 90 tablet 0    lovastatin (MEVACOR) 10 MG tablet TAKE 1 TABLET NIGHTLY 90 tablet 0    amLODIPine (NORVASC) 10 MG tablet TAKE ONE TABLET BY MOUTH DAILY 90 tablet 1    albuterol sulfate HFA (PROAIR HFA) 108 (90 Base) MCG/ACT inhaler Inhale 2 puffs into the lungs every 6 hours as needed for Wheezing 1 Inhaler 5    azelastine (ASTELIN) 0.1 % nasal spray 1 spray by Nasal route daily Use in each nostril as directed 1 Bottle     glycopyrrolate-formoterol (BEVESPI AEROSPHERE) 9-4.8 MCG/ACT AERO Inhale 2 puffs into the lungs 2 times daily 1 Inhaler 3    fluticasone (FLONASE) 50 MCG/ACT nasal spray 2 sprays by Nasal route daily      loperamide (IMODIUM) 2 MG capsule Take 2 mg by mouth 2 times daily as needed for Diarrhea       fluticasone (CUTIVATE) 0.05 % cream Apply topically 2 times daily as needed. 60 g 3       Allergies   Allergen Reactions    Diovan [Valsartan]     Ezetimibe-Simvastatin     Ezetimibe-Simvastatin     Biaxin [Clarithromycin] Rash     itching       Social History     Tobacco Use    Smoking status: Former Smoker     Packs/day: 2.50     Years: 33.00     Pack years: 82.50     Types: Cigarettes     Quit date: 12/4/2006     Years since quitting: 15.1    Smokeless tobacco: Never Used    Tobacco comment: smoked for 33 yrs / smoked up to 2.5   Substance Use Topics    Alcohol use:  Yes     Alcohol/week: 0.0 standard drinks     Comment: occasional alcohol use        /64 (Site: Left Upper Arm, Position: Sitting, Cuff Size: Large Adult)   Pulse 74   Temp 97.7 °F (36.5 °C) (Infrared)   Resp 12   Wt 194 lb 8 oz (88.2 kg)   BMI 27.91 kg/m²     Objective:   Physical Exam  Constitutional:       General: He is not in acute distress. Appearance: He is well-developed. Neck:      Vascular: No carotid bruit. Cardiovascular:      Rate and Rhythm: Normal rate and regular rhythm. Pulses:           Dorsalis pedis pulses are 2+ on the right side and 2+ on the left side. Posterior tibial pulses are 2+ on the right side and 2+ on the left side. Heart sounds: Normal heart sounds. No murmur heard. No friction rub. No gallop. Pulmonary:      Effort: Pulmonary effort is normal. No respiratory distress. Breath sounds: Wheezing and rhonchi present. Musculoskeletal:         General: No tenderness. Normal range of motion. Right lower leg: No edema. Left lower leg: No edema. Neurological:      Mental Status: He is alert and oriented to person, place, and time. Sensory: Sensation is intact. Motor: Motor function is intact. Psychiatric:         Behavior: Behavior is cooperative. Assessment:      Mariel Horta was seen today for hyperlipidemia and anxiety. Diagnoses and all orders for this visit:    Chronic renal failure, stage 3 (moderate), unspecified whether stage 3a or 3b CKD ([facility]  -     Comprehensive Metabolic Panel; Future  -     CBC; Future    Screening PSA (prostate specific antigen)  -     PSA screening; Future    Acquired hypothyroidism  -     TSH without Reflex; Future    Pulmonary emphysema, unspecified emphysema type (HCC)    Paroxysmal atrial fibrillation (HCC)    PVD (peripheral vascular disease) ([facility]    H/O thrombosis    Mixed hyperlipidemia    Elevated liver function tests    Other orders  -     metoprolol tartrate (LOPRESSOR) 50 MG tablet;  Take 1 tablet by mouth 2 times daily  -     levothyroxine (SYNTHROID) 50 MCG tablet; TAKE 1 TABLET DAILY  -     lovastatin (MEVACOR) 10 MG tablet; TAKE 1 TABLET NIGHTLY  -     amLODIPine (NORVASC) 10 MG tablet; TAKE ONE TABLET BY MOUTH DAILY            Plan:      Reviewed labs and test results with patient and wife    Maintain current oral medications and strongly encourage patient to restart the Bevespi inhaler. In regards to recent COVID infection recommend patient receive COVID vaccinations and we also discussed Pneumovax but he wants to hold off on those for right now. Make an appointment with vascular surgery to be reevaluated for the abdominal thrombosis probably secondary to atrial fibrillation. Patient received counseling on the following healthy behaviors: nutrition and exercise     Patient given educational materials     Health maintenance updated    Discussed use, benefit, and side effects of prescribed medications. Barriers to medication compliance addressed. All patient questions answered. Pt voiced understanding. Patient needs RTC in 6 months. Medical decision making of moderate complexity. Please note that this chart was generated using Dragon dictation software. Although every effort was made to ensure the accuracy of this automated transcription, some errors in transcription may have occurred.

## 2022-01-26 ENCOUNTER — TELEPHONE (OUTPATIENT)
Dept: PULMONOLOGY | Age: 67
End: 2022-01-26

## 2022-01-26 DIAGNOSIS — R91.1 LUNG NODULE: Primary | ICD-10-CM

## 2022-01-26 DIAGNOSIS — Z87.891 PERSONAL HISTORY OF TOBACCO USE: ICD-10-CM

## 2022-01-26 NOTE — TELEPHONE ENCOUNTER
Spoke with patient's wife. Her insurance will not pay unless order states Annual CT Lung screen. Order changed.

## 2022-01-26 NOTE — TELEPHONE ENCOUNTER
Pt wife called and said that we need to put in another order for the ct. She said we need to put in the Annual low dose ct.     Call her to discuss [phone]

## 2022-02-21 ENCOUNTER — HOSPITAL ENCOUNTER (OUTPATIENT)
Dept: CT IMAGING | Age: 67
Discharge: HOME | End: 2022-02-21
Payer: COMMERCIAL

## 2022-02-21 DIAGNOSIS — R91.1 LUNG NODULE: ICD-10-CM

## 2022-02-21 PROCEDURE — 71250 CT THORAX DX C-: CPT

## 2022-03-04 ENCOUNTER — TELEPHONE (OUTPATIENT)
Dept: CARDIOLOGY CLINIC | Age: 67
End: 2022-03-04

## 2022-03-04 NOTE — TELEPHONE ENCOUNTER
Increase metoprolol to 75 mg BID.  If his BP remains elevated after 1-2 weeks, have him call his nephrology to discuss adding an ACE/ARB for further BP control    Hermes Brandon, APRN-CNP

## 2022-03-04 NOTE — TELEPHONE ENCOUNTER
Calling in to report high BP readings:  2/27 142/77 p 52  3/3 166/85 p 68  3/4 173/73 p 59, two hours after meds, 149/76 p 51  Machine average states 150/79

## 2022-03-07 ENCOUNTER — OFFICE VISIT (OUTPATIENT)
Dept: PULMONOLOGY | Age: 67
End: 2022-03-07
Payer: COMMERCIAL

## 2022-03-07 VITALS
BODY MASS INDEX: 28.63 KG/M2 | WEIGHT: 200 LBS | HEART RATE: 55 BPM | SYSTOLIC BLOOD PRESSURE: 132 MMHG | DIASTOLIC BLOOD PRESSURE: 72 MMHG | HEIGHT: 70 IN | OXYGEN SATURATION: 98 %

## 2022-03-07 DIAGNOSIS — J44.9 COPD, MODERATE (HCC): Primary | ICD-10-CM

## 2022-03-07 DIAGNOSIS — R91.8 MULTIPLE LUNG NODULES ON CT: ICD-10-CM

## 2022-03-07 PROCEDURE — 99214 OFFICE O/P EST MOD 30 MIN: CPT | Performed by: INTERNAL MEDICINE

## 2022-03-07 ASSESSMENT — ENCOUNTER SYMPTOMS
ABDOMINAL DISTENTION: 0
BLOOD IN STOOL: 0
STRIDOR: 0
SORE THROAT: 0
ABDOMINAL PAIN: 0
SINUS PRESSURE: 0
DIARRHEA: 0
WHEEZING: 0
COUGH: 0
APNEA: 0
CONSTIPATION: 0
VOICE CHANGE: 0
ANAL BLEEDING: 0
CHEST TIGHTNESS: 0
BACK PAIN: 0
SHORTNESS OF BREATH: 0
CHOKING: 0
RHINORRHEA: 0

## 2022-03-07 NOTE — PROGRESS NOTES
9-4.8 MCG/ACT AERO Inhale 2 puffs into the lungs 2 times daily 1 Inhaler 3    fluticasone (FLONASE) 50 MCG/ACT nasal spray 2 sprays by Nasal route daily      loperamide (IMODIUM) 2 MG capsule Take 2 mg by mouth 2 times daily as needed for Diarrhea       fluticasone (CUTIVATE) 0.05 % cream Apply topically 2 times daily as needed. 60 g 3       Immunization History   Administered Date(s) Administered    Influenza Virus Vaccine 10/21/2010, 10/21/2011, 10/03/2012, 11/28/2014       Past Medical History:   Diagnosis Date    Anxiety state, unspecified     Asthma     Back problem     Chronic airway obstruction, not elsewhere classified     Generalized osteoarthrosis, involving multiple sites     Insomnia, unspecified     Other and unspecified hyperlipidemia     Rosacea     Thyroid disease     Unspecified essential hypertension      Past Surgical History:   Procedure Laterality Date    ARM SURGERY Right 1986    fracture surgery    CLAVICLE EXCISION       Family History   Problem Relation Age of Onset    Emphysema Mother     Stroke Father 76    Cancer Father     Kidney Disease Brother         renal cancer    High Blood Pressure Brother     Prostate Cancer Brother     Prostate Cancer Brother        Review of Systems:  Review of Systems   Constitutional: Negative for activity change, appetite change, fatigue and fever. HENT: Negative for congestion, ear discharge, ear pain, postnasal drip, rhinorrhea, sinus pressure, sneezing, sore throat, tinnitus and voice change. Respiratory: Negative for apnea, cough, choking, chest tightness, shortness of breath, wheezing and stridor. Cardiovascular: Negative for chest pain, palpitations and leg swelling. Gastrointestinal: Negative for abdominal distention, abdominal pain, anal bleeding, blood in stool, constipation and diarrhea. Musculoskeletal: Negative for arthralgias, back pain and gait problem. Skin: Negative for pallor and rash. Allergic/Immunologic: Negative for environmental allergies. Neurological: Negative for dizziness, tremors, seizures, syncope, speech difficulty, weakness, light-headedness, numbness and headaches. Hematological: Negative for adenopathy. Does not bruise/bleed easily. Psychiatric/Behavioral: Negative for sleep disturbance. Vitals:    03/07/22 1406   BP: 132/72   Pulse: 55   SpO2: 98%   Weight: 200 lb (90.7 kg)   Height: 5' 10\" (1.778 m)     Patient-Reported Vitals 11/19/2020   Patient-Reported Systolic 615   Patient-Reported Diastolic 90      Body mass index is 28.7 kg/m². Wt Readings from Last 3 Encounters:   03/07/22 200 lb (90.7 kg)   01/17/22 194 lb (88 kg)   01/17/22 194 lb 8 oz (88.2 kg)     BP Readings from Last 3 Encounters:   03/07/22 132/72   01/17/22 (!) 152/71   01/17/22 118/64         Physical Exam  Constitutional:       General: He is not in acute distress. Appearance: He is well-developed. He is not diaphoretic. HENT:      Mouth/Throat:      Pharynx: No oropharyngeal exudate. Cardiovascular:      Rate and Rhythm: Normal rate and regular rhythm. Heart sounds: Normal heart sounds. No murmur heard. Pulmonary:      Effort: No respiratory distress. Breath sounds: Normal breath sounds. No wheezing, rhonchi or rales. Chest:      Chest wall: No tenderness. Abdominal:      General: There is no distension. Palpations: There is no mass. Tenderness: There is no abdominal tenderness. There is no guarding or rebound. Musculoskeletal:         General: No swelling, tenderness or deformity. Skin:     Coloration: Skin is not pale. Findings: No erythema or rash. Neurological:      Mental Status: He is alert and oriented to person, place, and time. Cranial Nerves: No cranial nerve deficit. Motor: No abnormal muscle tone.       Coordination: Coordination normal.      Deep Tendon Reflexes: Reflexes normal.             Health Maintenance   Topic Date Due    Hepatitis C screen  Never done    Pneumococcal 0-64 years Vaccine (1 of 2 - PPSV23) Never done    Lipid screen  Never done    HIV screen  Never done    Diabetes screen  Never done    TSH testing  12/14/2018    Potassium monitoring  12/14/2018    Creatinine monitoring  12/14/2018    DTaP/Tdap/Td vaccine (1 - Tdap) 06/14/2022 (Originally 10/27/1974)    Shingles Vaccine (1 of 2) 06/14/2022 (Originally 10/27/2007)    COVID-19 Vaccine (1) 01/16/2023 (Originally 10/27/1960)    Flu vaccine (1) 01/17/2023 (Originally 9/1/2021)    Depression Screen  11/29/2022    Colorectal Cancer Screen  09/28/2028    Hepatitis A vaccine  Aged Out    Hepatitis B vaccine  Aged Out    Hib vaccine  Aged Out    Meningococcal (ACWY) vaccine  Aged Out          Assessment/Plan:     Diagnosis Orders   1. COPD, moderate ([address]  Full PFT Study With Bronchodilator    2. Multiple lung nodules on CT  Moderate COPD, continue Bevespi inhaler 2 puffs twice daily. Advised patient to be compliant with routine use of inhaler as recommended. Currently no signs of COPD exacerbation. Mild COVID-19 infection in December. Reviewed patient's LDCT study performed on 2/21 which shows multiple small bilateral lung nodules, few new ones particularly in the right lower lobe. They measure up to 3 mm in size, will only recommend annual LDCT in 1 year. Prior PFT study in July 2020 revealed moderate obstruction with FEV1 of 1.87 L [55% predicted]. Will aim to repeat PFT study once again prior to next visit. Return in about 6 months (around 9/7/2022).

## 2022-03-21 ENCOUNTER — TELEPHONE (OUTPATIENT)
Dept: FAMILY MEDICINE CLINIC | Age: 67
End: 2022-03-21

## 2022-03-21 NOTE — TELEPHONE ENCOUNTER
Patient's mom [phone] called to report that his bp has been running 140-150. Then 2 hours after his med, it goes to 130-140. Then a few hours it goes up to 140-150 again. She called the heart doctor who recommended that (1) patient see the nephrologist, (2) get an ACE or ARB. She wanted to know what you thought.     Please advise

## 2022-03-21 NOTE — TELEPHONE ENCOUNTER
Blood pressure here in the office is typically been well controlled. Blood pressure is always something that should be controlled long-term rather than moment by moment.   Certainly nephrology consultation would be fine especially with his history of kidney stress

## 2022-03-21 NOTE — TELEPHONE ENCOUNTER
Talked to patient's wife and his heart rate has been running 47-low 52's, his wife was concerned about this. Is this ok for him?

## 2022-03-22 NOTE — TELEPHONE ENCOUNTER
The heart rate is low secondary to the beta-blocker which is for his blood pressure. This is been noted for some time.   No change of therapy

## 2022-06-17 ENCOUNTER — TELEPHONE (OUTPATIENT)
Dept: FAMILY MEDICINE CLINIC | Age: 67
End: 2022-06-17

## 2022-06-17 NOTE — TELEPHONE ENCOUNTER
Jermaine Michael, wife called stating that the Pt heart rate is 48-52, she is wanting to know if Pt can decrease the metoprolol to taking 50mg am and 25pm.  Pt is having dizzy spells, tired all the time,with lightheadiness.   Please call to discuss

## 2022-06-17 NOTE — TELEPHONE ENCOUNTER
Wife Bennett Knott, states that they called heart doctor due to HR 50 and BP was high,  Placed on Losartan 25 mg from Kidney doctor. His BP has been running great, but they are wanting to know if they can decrease the Metropolol to 75 mg from 100 mg due to the HR being low and not coming up. FYI They switched kidney doctors and is seeing Dr. Kelly Melara with [facility].

## 2022-06-17 NOTE — TELEPHONE ENCOUNTER
I would defer this to the cardiologist to decide but definitely keep his appointment with me in July

## 2022-06-20 NOTE — TELEPHONE ENCOUNTER
Reduce metoprolol to 25 mg BID. If HR remains low (<55 consistently) after this change, they should let us know.     Darlin Zamora, APRN-CNP

## 2022-07-12 ENCOUNTER — TELEPHONE (OUTPATIENT)
Dept: FAMILY MEDICINE CLINIC | Age: 67
End: 2022-07-12

## 2022-07-12 DIAGNOSIS — E78.2 MIXED HYPERLIPIDEMIA: Primary | ICD-10-CM

## 2022-07-12 NOTE — TELEPHONE ENCOUNTER
Patient's wife is calling to see if we received the ;lab work from [facility] yet. States that they went on thurday last week to get them done.      Please advise

## 2022-07-12 NOTE — TELEPHONE ENCOUNTER
Patient has an appoint with me on July 15 and we can review his laboratory testing then but TSH, PSA and lipid panel were not performed

## 2022-07-13 DIAGNOSIS — Z12.5 SCREENING PSA (PROSTATE SPECIFIC ANTIGEN): ICD-10-CM

## 2022-07-13 DIAGNOSIS — R91.1 LUNG NODULE: ICD-10-CM

## 2022-07-13 DIAGNOSIS — Z87.891 PERSONAL HISTORY OF TOBACCO USE: ICD-10-CM

## 2022-07-13 DIAGNOSIS — J44.9 COPD, MODERATE (HCC): ICD-10-CM

## 2022-07-13 DIAGNOSIS — E03.9 ACQUIRED HYPOTHYROIDISM: ICD-10-CM

## 2022-07-13 DIAGNOSIS — N18.2 CKD (CHRONIC KIDNEY DISEASE), STAGE II: ICD-10-CM

## 2022-07-13 DIAGNOSIS — E78.2 MIXED HYPERLIPIDEMIA: ICD-10-CM

## 2022-07-13 LAB
A/G RATIO: 2 (ref 1.1–2.2)
ALBUMIN SERPL-MCNC: 4.7 G/DL (ref 3.4–5)
ALP BLD-CCNC: 63 U/L (ref 40–129)
ALT SERPL-CCNC: 18 U/L (ref 10–40)
ANION GAP SERPL CALCULATED.3IONS-SCNC: 16 MMOL/L (ref 3–16)
AST SERPL-CCNC: 26 U/L (ref 15–37)
BILIRUB SERPL-MCNC: 0.8 MG/DL (ref 0–1)
BUN BLDV-MCNC: 20 MG/DL (ref 7–20)
CALCIUM SERPL-MCNC: 9.8 MG/DL (ref 8.3–10.6)
CHLORIDE BLD-SCNC: 104 MMOL/L (ref 99–110)
CHOLESTEROL, TOTAL: 145 MG/DL (ref 0–199)
CO2: 23 MMOL/L (ref 21–32)
CREAT SERPL-MCNC: 1.2 MG/DL (ref 0.8–1.3)
GFR AFRICAN AMERICAN: >60
GFR NON-AFRICAN AMERICAN: >60
GLUCOSE BLD-MCNC: 82 MG/DL (ref 70–99)
HDLC SERPL-MCNC: 45 MG/DL (ref 40–60)
LDL CHOLESTEROL CALCULATED: 77 MG/DL
POTASSIUM SERPL-SCNC: 5.2 MMOL/L (ref 3.5–5.1)
SODIUM BLD-SCNC: 143 MMOL/L (ref 136–145)
TOTAL PROTEIN: 7 G/DL (ref 6.4–8.2)
TRIGL SERPL-MCNC: 115 MG/DL (ref 0–150)
VLDLC SERPL CALC-MCNC: 23 MG/DL

## 2022-07-14 LAB
PROSTATE SPECIFIC ANTIGEN: 0.9 NG/ML (ref 0–4)
TSH SERPL DL<=0.05 MIU/L-ACNC: 2.15 UIU/ML (ref 0.27–4.2)

## 2022-07-15 ENCOUNTER — OFFICE VISIT (OUTPATIENT)
Dept: FAMILY MEDICINE CLINIC | Age: 67
End: 2022-07-15
Payer: COMMERCIAL

## 2022-07-15 VITALS
TEMPERATURE: 97.9 F | SYSTOLIC BLOOD PRESSURE: 102 MMHG | OXYGEN SATURATION: 98 % | HEART RATE: 60 BPM | BODY MASS INDEX: 28.2 KG/M2 | DIASTOLIC BLOOD PRESSURE: 62 MMHG | WEIGHT: 197 LBS | HEIGHT: 70 IN

## 2022-07-15 DIAGNOSIS — L57.0 ACTINIC KERATOSIS: ICD-10-CM

## 2022-07-15 DIAGNOSIS — E03.9 ACQUIRED HYPOTHYROIDISM: Primary | ICD-10-CM

## 2022-07-15 DIAGNOSIS — F41.1 ANXIETY STATE: ICD-10-CM

## 2022-07-15 DIAGNOSIS — N18.30 CHRONIC RENAL FAILURE, STAGE 3 (MODERATE), UNSPECIFIED WHETHER STAGE 3A OR 3B CKD (HCC): ICD-10-CM

## 2022-07-15 DIAGNOSIS — E78.2 MIXED HYPERLIPIDEMIA: ICD-10-CM

## 2022-07-15 DIAGNOSIS — I10 ESSENTIAL HYPERTENSION: ICD-10-CM

## 2022-07-15 PROCEDURE — 17000 DESTRUCT PREMALG LESION: CPT | Performed by: FAMILY MEDICINE

## 2022-07-15 PROCEDURE — 99214 OFFICE O/P EST MOD 30 MIN: CPT | Performed by: FAMILY MEDICINE

## 2022-07-15 PROCEDURE — 17003 DESTRUCT PREMALG LES 2-14: CPT | Performed by: FAMILY MEDICINE

## 2022-07-15 RX ORDER — LEVOTHYROXINE SODIUM 0.05 MG/1
TABLET ORAL
Qty: 90 TABLET | Refills: 3 | Status: SHIPPED | OUTPATIENT
Start: 2022-07-15

## 2022-07-15 RX ORDER — LOSARTAN POTASSIUM 50 MG/1
100 TABLET ORAL DAILY
COMMUNITY

## 2022-07-15 ASSESSMENT — PATIENT HEALTH QUESTIONNAIRE - PHQ9
SUM OF ALL RESPONSES TO PHQ QUESTIONS 1-9: 0
SUM OF ALL RESPONSES TO PHQ QUESTIONS 1-9: 0
1. LITTLE INTEREST OR PLEASURE IN DOING THINGS: 0
SUM OF ALL RESPONSES TO PHQ QUESTIONS 1-9: 0
SUM OF ALL RESPONSES TO PHQ9 QUESTIONS 1 & 2: 0
SUM OF ALL RESPONSES TO PHQ QUESTIONS 1-9: 0
2. FEELING DOWN, DEPRESSED OR HOPELESS: 0

## 2022-07-15 NOTE — PROGRESS NOTES
Subjective:      Patient ID: Joey Dumont is a 61 y.o. male. CC: Patient presents for re-evaluation of chronic health problems including hypertension, hypothyroidism, chronic renal failure, hyperlipidemia and skin lesions. HPIPatient presents today for a follow-up on chronic medications and medical conditions in accompaniment of wife. Since last appointment time patient was seen by multiple physicians. He is with a new nephrologist and is on losartan at this point in time. Also he was to have an bradycardia and metoprolol was decreased down to 25 mg twice a day. Patient is going to be evaluated by Dr. Carrington Chowdhury cardiologist in the near future. He was evaluated by pulmonary specialist but is not using the Bevespi inhaler. Patient still has the cough. He is a non-smoker at this time. He has been compliant the rest of his medication. Patient also has skin lesions on his arms and face that he wants evaluation for      Review of Systems    Patient Active Problem List   Diagnosis    Anxiety state    Generalized osteoarthrosis, involving multiple sites    Mixed hyperlipidemia    Rosacea    COPD (chronic obstructive pulmonary disease) ([facility])    Hypothyroid    Urinary hesitancy due to benign prostatic hyperplasia    Primary osteoarthritis of right knee    Chronic renal failure, stage 3 (moderate) (HCC)    Pinguecula    Paroxysmal atrial fibrillation ([facility])    PVD (peripheral vascular disease) ([facility]    H/O thrombosis       Outpatient Medications Marked as Taking for the 7/15/22 encounter (Office Visit) with Caitie Zhang MD   Medication Sig Dispense Refill    losartan (COZAAR) 50 MG tablet Take 50 mg by mouth in the morning. metoprolol tartrate (LOPRESSOR) 25 MG tablet Take 1 tablet by mouth in the morning and 1 tablet before bedtime.  180 tablet 0    levothyroxine (SYNTHROID) 50 MCG tablet TAKE 1 TABLET DAILY 90 tablet 3    lovastatin (MEVACOR) 10 MG tablet TAKE 1 TABLET NIGHTLY 90 tablet 1    amLODIPine (NORVASC) 10 MG tablet TAKE ONE TABLET BY MOUTH DAILY 90 tablet 1    Coenzyme Q10 (CO Q 10 PO) Take by mouth      Cholecalciferol (VITAMIN D) 50 MCG (2000 UT) CAPS capsule Take by mouth      apixaban (ELIQUIS) 5 MG TABS tablet Take 1 tablet by mouth 2 times daily Take 5 mg by mouth 2 times daily 180 tablet 3    albuterol sulfate HFA (PROAIR HFA) 108 (90 Base) MCG/ACT inhaler Inhale 2 puffs into the lungs every 6 hours as needed for Wheezing 1 Inhaler 5    azelastine (ASTELIN) 0.1 % nasal spray 1 spray by Nasal route daily Use in each nostril as directed 1 Bottle     fluticasone (FLONASE) 50 MCG/ACT nasal spray 2 sprays by Nasal route daily      loperamide (IMODIUM) 2 MG capsule Take 2 mg by mouth 2 times daily as needed for Diarrhea       fluticasone (CUTIVATE) 0.05 % cream Apply topically 2 times daily as needed. 60 g 3       Allergies   Allergen Reactions    Diovan [Valsartan]     Ezetimibe-Simvastatin     Ezetimibe-Simvastatin     Clarithromycin Rash     itching  itching  itching  itching       Social History     Tobacco Use    Smoking status: Former     Packs/day: 2.50     Years: 33.00     Pack years: 82.50     Types: Cigarettes     Quit date: 12/4/2006     Years since quitting: 15.6    Smokeless tobacco: Never    Tobacco comments:     smoked for 33 yrs / smoked up to 2.5   Substance Use Topics    Alcohol use: Yes     Alcohol/week: 0.0 standard drinks     Comment: occasional alcohol use        /62 (Site: Left Upper Arm, Position: Sitting, Cuff Size: Medium Adult)   Pulse 60   Temp 97.9 °F (36.6 °C) (Infrared)   Ht 5' 10\" (1.778 m)   Wt 197 lb (89.4 kg)   SpO2 98%   BMI 28.27 kg/m²         Objective:   Physical Exam  Constitutional:       General: He is not in acute distress. Appearance: He is well-developed. Neck:      Vascular: No carotid bruit. Cardiovascular:      Rate and Rhythm: Normal rate and regular rhythm.       Pulses:           Dorsalis pedis pulses are 2+ on the right side and 2+ on the left side. Posterior tibial pulses are 2+ on the right side and 2+ on the left side. Heart sounds: Normal heart sounds. No murmur heard. No friction rub. No gallop. Pulmonary:      Effort: Pulmonary effort is normal. No respiratory distress. Breath sounds: Wheezing and rhonchi present. Musculoskeletal:         General: No tenderness. Normal range of motion. Right lower leg: No edema. Left lower leg: No edema. Skin:     Findings: Lesion present. Comments: Actinic keratosis noted on right arm x2, left forearm and left angle of jaw    Neurological:      Mental Status: He is alert and oriented to person, place, and time. Sensory: Sensation is intact. Motor: Motor function is intact. Psychiatric:         Behavior: Behavior is cooperative. Assessment:      Vero Yarbrough was seen today for follow-up. Diagnoses and all orders for this visit:    Acquired hypothyroidism    Chronic renal failure, stage 3 (moderate), unspecified whether stage 3a or 3b CKD (HCC)    Mixed hyperlipidemia    Actinic keratosis    Anxiety state    Essential hypertension    Other orders  -     metoprolol tartrate (LOPRESSOR) 25 MG tablet; Take 1 tablet by mouth in the morning and 1 tablet before bedtime.  -     levothyroxine (SYNTHROID) 50 MCG tablet; TAKE 1 TABLET DAILY          Plan:      Reviewed labs and test results with patient. Maintain current medications And recommended patient bring blood pressure cuffs to the office as his home blood pressure cuff is reading significantly different. Patient wishes to proceed with cryotherapy of the skin lesions    Patient received counseling on the following healthy behaviors: nutrition and exercise     Patient given educational materials     Health maintenance updated    Discussed use, benefit, and side effects of prescribed medications. Barriers to medication compliance addressed. All patient questions answered.   Pt voiced understanding. Patient needs RTC in 6 months. Medical decision making of moderate complexity. Please note that this chart was generated using Dragon dictation software. Although every effort was made to ensure the accuracy of this automated transcription, some errors in transcription may have occurred.

## 2022-07-15 NOTE — PROGRESS NOTES
Cryotherapy Procedure Note    Pre-operative Diagnosis:actinic keratosis 5    Post-operative Diagnosis: same    Locations: Multiple locations    Procedure Details   2 cycles of liquid nitrogen applied to affected sites. Complications: none. Plan:  1. Patient was educated regarding the potential risks of blister formation, discomfort, hypopigmentation, and scar. 2. Wound care was discussed. 3. Recommended that the patient use OTC analgesics as needed for pain. 4. Plan for RTC in 3-4 weeks for re-treatment if needed.

## 2022-08-29 ENCOUNTER — TELEPHONE (OUTPATIENT)
Dept: CARDIOLOGY CLINIC | Age: 67
End: 2022-08-29
Payer: COMMERCIAL

## 2022-08-29 DIAGNOSIS — I48.0 PAROXYSMAL ATRIAL FIBRILLATION (HCC): Primary | ICD-10-CM

## 2022-08-29 PROCEDURE — 93246 EXT ECG>7D<15D RECORDING: CPT | Performed by: INTERNAL MEDICINE

## 2022-08-30 ENCOUNTER — NURSE ONLY (OUTPATIENT)
Dept: CARDIOLOGY CLINIC | Age: 67
End: 2022-08-30

## 2022-08-30 NOTE — PROGRESS NOTES
Placed biotel heart monitor on pt and pt was given instructions. Pt verbalized understanding.     Device Name: [id number]

## 2022-09-13 PROBLEM — R00.1 BRADYCARDIA: Status: ACTIVE | Noted: 2022-09-13

## 2022-09-19 ENCOUNTER — OFFICE VISIT (OUTPATIENT)
Dept: CARDIOLOGY CLINIC | Age: 67
End: 2022-09-19
Payer: COMMERCIAL

## 2022-09-19 VITALS
SYSTOLIC BLOOD PRESSURE: 160 MMHG | DIASTOLIC BLOOD PRESSURE: 80 MMHG | BODY MASS INDEX: 27.8 KG/M2 | OXYGEN SATURATION: 97 % | WEIGHT: 194.2 LBS | HEART RATE: 60 BPM | HEIGHT: 70 IN

## 2022-09-19 DIAGNOSIS — I48.0 PAROXYSMAL ATRIAL FIBRILLATION (HCC): Primary | ICD-10-CM

## 2022-09-19 PROCEDURE — 93000 ELECTROCARDIOGRAM COMPLETE: CPT | Performed by: INTERNAL MEDICINE

## 2022-09-19 PROCEDURE — 99215 OFFICE O/P EST HI 40 MIN: CPT | Performed by: INTERNAL MEDICINE

## 2022-09-19 NOTE — PROGRESS NOTES
[address]   Electrophysiology  Date: 9/19/2022      CC: Bradycardia   HPI: Everardo Claros is a 61 y.o. male with a PMH of anemia, CKD, Proxysmal atrial fibrillation, HTN, hypothyroidism and vascular disease    12/6/2020 was admitted to [facility] with severe abdominal pain. 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 Underwent urgent SMA thrombectomy and exploratory laparotomy. Underwent secondary surgery for small bowel resection. Hospital course further complicated by new onset atrial fibrillation, ECG 12/7/2020 revealed atrial fibrillation with RVR.      1/20/2021 Presented for f/u with  at [facility]. ECG revealed SR. Discussion of cessation of metoprolol and started on Sotalol 80 mg BID. BP was said to not be well controlled. Monitor 04/2021 to 05/19/2021 - High Heart rate 120 bpm, low Heart rate 50 bpm, Average heart rate 64 bpm.  PAC 1% PAT .    06/20/2022 - called with c/o dizzy spells and heart rate 48-52. Metoprolol reduced to 50mg in the am and 25 in the pm    Holter 08/30/2022 to 09/13/2022-  Sinus rhythm-   Average heart rate  was 64 bpm,     Symptoms including chest pain reported with sinus rhythm, 33 short artrial runs, longest 40 beats. - no symptoms. One episode NSVT     Milvia Peña presents today in follow up to discuss his atrial fibrillation and bradycardia. He has occasional dizziness and light headedness. He also feels his heart racing from time to time. He states he has noticed any significant changes since reducing his toprol. He complains of very brief episodes of chest  pain while at rest.      Assessment and plan:    - Chest pain   Patient complains of chest pain while in sinus rhythm. During Holter monitoring he has had a few episodes of feeling chest discomfort. Described as substernal.   - He has vascular disease and at high risk for CAD. - Will refer to inter vetionbal cardiology for ischemic work up and possible angiography. - Bradycardia   - Sinus gail rate 54 today    - metoprolol has been reduced to 12.5 mg BID   -We discussed treatment options including implantation of pacemaker in future if he has symptomatic bradycardia. He does have paroxysmal atrial fibrillation with rapid ventricular response. We also discussed ablation of atrial fibrillation.    - Atrial fibrillation, Paroxysmal     EKG today Sinus gail cardia    Metoprolol 12.5 mg BID    -Afib risk factors including age, HTN, obesity, inactivity and RUI were discussed with patient. Risk factor modification recommended. All questions were answered.  -He has history of uncontrolled blood pressure, no alcohol usage but also hx of excessive caffeine use     -Antiarrhythmics are limited due to intermittent episodes of bradycardia.     -Patient has a [id number] Score of 3 ( age, HTN,PVD )      ~ Continue Eliquis 5 mg BID. Needs lifelong anticoagulation. ~ creatinine 07/13/2022 1.2     -We discussed progressive nature of atrial fibrillation. Treatment success decreases when AF becomes persistent and last more than 6 months.    -Antiarrhythmic therapy, side effects, benefits and alternative discussed. Has mild LVH       -Atrial fibrillation ablation procedure was discussed. We discussed the need for repeat procedure. On average patients may need more than one ablation procedure.     -Risks associated with ablation include but not limited to allergic reaction to the medications, pain, bleeding, infection, nerve injury, injury to diaphragm(breathing muscle), pulmonary embolus(blood clot in lungs), deep vein blood clot, pneumothorax, hemothorax, acute renal failure, cardiac perforation,  tamponade, need for emergent surgery (open heart), permanent pacemaker, pulmonary vein stenosis, left atrial to esophageal fistula, stroke, myocardial infarction and death. Difference between atrial fibrillation and atrial flutter discussed and treatment discussed.     I have discussed ablation with him in the past. He and his wife verbalize understanding of ablation and its purpose, risks and benefits. He understands that rate control medication is limited by his bradycardia. They will think about procedure and will let me know. If they decide to proceed, can consider ablation after CAD evaluation is complete with IC.        -SMA embolism    -Underwent urgent SMA thrombectomy and exploratory laparotomy   -Secondary thrombus noted in LLE for which he underwent a mechanical   thrombectomy   -Vascular surgery evaluation at [address] [place] [address],    Lifelong anticoagulation.     - Mesenteric artery dissection   - stable per CT 04/25/2022   - follows with Dr. Raheem Ramos at [facility]     -[facility] with Renato Londono Vascular surgery at [place] [address]    - embolic event related to SMA embolism    -Anemia   -CBC at [facility] 06/03/2022 , H/H 16.0/48.7    -Followed by    -On Iron supplements    -HTN  -Not well controlled: 160/80  -On amlodipine 10 mg daily, metoprolol 12.5 mg BID, losartan 100 mg daily added in June  -BP goal <130/80/  -Home BP monitoring encouraged, printed information provided on how to accurately measure BP at home.  -Counseled to follow a low salt diet to assure blood pressure remains controlled for cardiovascular risk factor modification.   -The patient is counseled to get regular exercise 3-5 times per week and maintain a healthy weight reduce cardiovascular risk factors.    -follows with nephrology Dr. Prabha Olivarez  - recommend Hydrochlorothiazide -   discuss with nephrology         Patient Active Problem List    Diagnosis Date Noted    Bradycardia 09/13/2022    H/O thrombosis 01/03/2022    PVD (peripheral vascular disease) ([address] 04/20/2021    Paroxysmal atrial fibrillation ([address] 02/05/2021    Chronic renal failure, stage 3 (moderate) ([address] 07/27/2020    Primary osteoarthritis of right knee 01/25/2018    Urinary hesitancy due to benign prostatic hyperplasia 12/26/2017    Pinguecula 09/25/2015    Hypothyroid 06/26/2015    COPD (chronic obstructive pulmonary disease) [facility] 11/25/2013    Anxiety state     Essential hypertension     Generalized osteoarthrosis, involving multiple sites     Mixed hyperlipidemia     Rosacea      Diagnostic studies:   ECG 9/19/22  Sinus Dwayne cardia - rate 54   QRS 96,     CTA 04/25/2022   1. Focal superior mesenteric artery dissection, stable. 2. No significant aortoiliac inflow disease. 3. Mild calcific plaque superficial femoral arteries within the abductor canals without flow limiting narrowing. 4. Three-vessel runoff on the right and two-vessel runoff on the left via anterior tibial and posterior tibial arteries. CTA 4/14/2021  Mild to moderate multifocal stenoses of the left three-vessel runoff, improved since the previous exam.        Chronic short segment dissection of the proximal SMA, with unchanged mild adjacent inflammation. Moderate stenosis of the celiac artery origin. Echo 12/7/2020 ([facility])  Summary:  The left ventricular function is normal.  Overall left ventricular ejection fraction is estimated to be 60-65%. There is moderate concentric left ventricular hypertrophy. The left ventricular wall motion is normal.  Injection of agitated saline showed no interatrial shunt. Echo 6/15/2020 ([facility])  Summary:  Overall left ventricular ejection fraction is estimated to be 60-65%. Left ventricular systolic function is normal. (LVEF>/=55%)  The left ventricle is normal in size. There is normal left ventricular wall thickness. The left ventricular wall motion is normal.  The diastolic function is impaired and classified as Grade 1 (impaired  relaxation). The Aortic Valve leaflets appear sclerotic.   Pulmonary artery pressure is normal.  The IVC size is normal.    Stress test 6/15/2020 ([facility])  Interpetation Summary:  The LV EF is 66%  Normal global and regional wall motion in all territories. o Non-diagnostic ECG for ischemia with pharmocologic stress. o Negative nuclear stress imaging for inducible ischemia. I independently reviewed the cardiac diagnostic studies, ECG and relevant imaging studies. Lab Results   Component Value Date    LVEF 71 03/07/2016     Lab Results   Component Value Date    TSH 2.15 07/13/2022       Physical Examination:  There were no vitals filed for this visit. Wt Readings from Last 3 Encounters:   07/15/22 197 lb (89.4 kg)   03/07/22 200 lb (90.7 kg)   01/17/22 194 lb (88 kg)       Constitutional: Oriented. No distress. Head: Normocephalic and atraumatic. Mouth/Throat: Oropharynx is clear and moist.   Eyes: Conjunctivae normal. EOM are normal.   Neck: Neck supple. No JVD present. Cardiovascular: Normal rate, regular rhythm, S1&S2. Pulmonary/Chest: Bilateral respiratory sounds. No rhonchi. Abdominal: Soft. No tenderness. Musculoskeletal: No tenderness. No edema    Lymphadenopathy: Has no cervical adenopathy. Neurological: Alert and oriented. Follows command, No Gross deficit   Skin: Skin is warm, No rash noted. Psychiatric: Has a normal behavior     Review of System:  [x] Full ROS obtained and negative except as mentioned in HPI    Prior to Admission medications    Medication Sig Start Date End Date Taking? Authorizing Provider   losartan (COZAAR) 50 MG tablet Take 50 mg by mouth in the morning. Historical Provider, MD   metoprolol tartrate (LOPRESSOR) 25 MG tablet Take 1 tablet by mouth in the morning and 1 tablet before bedtime.  7/15/22   Moriah Koenig MD   levothyroxine (SYNTHROID) 50 MCG tablet TAKE 1 TABLET DAILY 7/15/22   Moriah Koenig MD   lovastatin (MEVACOR) 10 MG tablet TAKE 1 TABLET NIGHTLY 1/17/22   Moriah Koenig MD   amLODIPine (NORVASC) 10 MG tablet TAKE ONE TABLET BY MOUTH DAILY 1/17/22   Moriah Koenig MD   Coenzyme Q10 (CO Q 10 PO) Take by mouth    Historical Provider, MD   Cholecalciferol (VITAMIN D) 50 MCG (2000 UT) CAPS capsule Take by mouth    Historical Provider, MD   apixaban (ELIQUIS) 5 MG TABS tablet Take 1 tablet by mouth 2 times daily Take 5 mg by mouth 2 times daily 1/3/22   CHAYITO Urban - CNP   albuterol sulfate HFA (PROAIR HFA) 108 (90 Base) MCG/ACT inhaler Inhale 2 puffs into the lungs every 6 hours as needed for Wheezing 8/24/21   Magdalena Osorio MD   azelastine (ASTELIN) 0.1 % nasal spray 1 spray by Nasal route daily Use in each nostril as directed 6/14/21   Callum Lara MD   glycopyrrolate-formoterol (BEVESPI AEROSPHERE) 9-4.8 MCG/ACT AERO Inhale 2 puffs into the lungs 2 times daily 5/4/21   Magdalena Osorio MD   fluticasone (FLONASE) 50 MCG/ACT nasal spray 2 sprays by Nasal route daily 12/29/20   Historical Provider, MD   loperamide (IMODIUM) 2 MG capsule Take 2 mg by mouth 2 times daily as needed for Diarrhea  12/29/20   Historical Provider, MD   fluticasone (CUTIVATE) 0.05 % cream Apply topically 2 times daily as needed. 4/26/19   Callum Lara MD       Past Medical History:   Diagnosis Date    Anxiety state, unspecified     Asthma     Back problem     Chronic airway obstruction, not elsewhere classified     Generalized osteoarthrosis, involving multiple sites     Insomnia, unspecified     Other and unspecified hyperlipidemia     Rosacea     Thyroid disease     Unspecified essential hypertension         Past Surgical History:   Procedure Laterality Date    ARM SURGERY Right 1986    fracture surgery    CLAVICLE EXCISION         Allergies   Allergen Reactions    Diovan [Valsartan]     Ezetimibe-Simvastatin     Ezetimibe-Simvastatin     Clarithromycin Rash     itching  itching  itching  itching       Social History:  Reviewed. reports that he quit smoking about 15 years ago. His smoking use included cigarettes. He has a 82.50 pack-year smoking history. He has never used smokeless tobacco. He reports current alcohol use.  He reports that he does not use drugs. Family History:  Reviewed. Reviewed. No family history of SCD. Relevant and available labs, and cardiovascular diagnostics reviewed. Reviewed. I independently reviewed all cardiac tracing. I independently reviewed relevant and available cardiac diagnostic tests ECG, CXR, Echo, Stress test, Device interrogation, Holter, CT scan. Outside medical records via Care everywhere reviewed and summarized in H&P above. Complex medical condition with multiple medical problems affecting prognosis and outcome of EP interventions      All questions and concerns were addressed to the patient/family. Alternatives to my treatment were discussed. I have discussed the above stated plan and the patient verbalized understanding and agreed with the plan. Scribe attestation: This note was scribed in the presence of Gama Panda MD by Ankit Barrera RN  Physician Attestation: I, Dr. Gama Panda, confirm that the scribe's documentation has been prepared under my direction and personally reviewed by me in its entirety. I also confirm that the note above accurately reflects all work, treatment, procedures, and medical decision making performed by me. NOTE: This report was transcribed using voice recognition software. Every effort was made to ensure accuracy, however, inadvertent computerized transcription errors may be present.      Gama Panda MD, MPH  [facility]   Office: [phone]  Fax: [phone]

## 2022-09-19 NOTE — PATIENT INSTRUCTIONS
Speak with your nephrologist regarding adding Hydrochlorothiazide to control BP    Refer to interventional cardiology

## 2022-09-20 PROCEDURE — 93248 EXT ECG>7D<15D REV&INTERPJ: CPT | Performed by: INTERNAL MEDICINE

## 2022-09-26 DIAGNOSIS — I10 ESSENTIAL HYPERTENSION: Primary | ICD-10-CM

## 2022-09-28 NOTE — PATIENT INSTRUCTIONS
Stop metoprolol (ok to take if you feel you are in Afib)   Schedule a stress test to evaluate the blood flow to your heart  We will call you after the test to determine a plan   Follow up with Dr Ava Sandoval in 6 months if stress test is normal     [phone] to schedule testing  ([facility])

## 2022-09-28 NOTE — PROGRESS NOTES
[address]   Cardiac Evaluation      Patient: Lisa Foy  YOB: 1955         Chief Complaint   Patient presents with    Hypertension    Hyperlipidemia    Shortness of Breath    New Patient          Referring provider: Erlin Nguyen MD    History of Present Illness:   Lisa Foy is a 61 y.o. male presenting today in consult with Dr Huseyin Oneal for reports of chest pain. He has a history of Proxysmal Afib, bradycardia, CKD and vascular disease. 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 Brother has hx of cabg, sister with afib . Today he is here with his wife. He is unsure why he was told to come in today. He does report that he is short of breath, but also has COPD. Wife reports that his HR has been low and they have been adjusting the metoprolol dose. He denies any chest pain at this time. He does feel that he has stress and anxiety at times and his BP will get higher when he feels that way. Recently started on nifedipine. With regard to medication therapy he/she has been compliant with prescribed regimen and has tolerated therapy to date. Past Medical History:   has a past medical history of Anxiety state, unspecified, Asthma, Back problem, Chronic airway obstruction, not elsewhere classified, Generalized osteoarthrosis, involving multiple sites, Insomnia, unspecified, Other and unspecified hyperlipidemia, Rosacea, Thyroid disease, and Unspecified essential hypertension. Surgical History:   has a past surgical history that includes Clavicle excision and Arm Surgery (Right, 1986).      Current Outpatient Medications   Medication Sig Dispense Refill    NIFEdipine (PROCARDIA XL) 60 MG extended release tablet       losartan (COZAAR) 50 MG tablet Take 100 mg by mouth daily      levothyroxine (SYNTHROID) 50 MCG tablet TAKE 1 TABLET DAILY 90 tablet 3    lovastatin (MEVACOR) 10 MG tablet TAKE 1 TABLET NIGHTLY 90 tablet 1    Coenzyme Q10 (CO Q 10 PO) Take by mouth Cholecalciferol (VITAMIN D) 50 MCG (2000 UT) CAPS capsule Take by mouth      apixaban (ELIQUIS) 5 MG TABS tablet Take 1 tablet by mouth 2 times daily Take 5 mg by mouth 2 times daily 180 tablet 3    albuterol sulfate HFA (PROAIR HFA) 108 (90 Base) MCG/ACT inhaler Inhale 2 puffs into the lungs every 6 hours as needed for Wheezing 1 Inhaler 5    azelastine (ASTELIN) 0.1 % nasal spray 1 spray by Nasal route daily Use in each nostril as directed 1 Bottle     glycopyrrolate-formoterol (BEVESPI AEROSPHERE) 9-4.8 MCG/ACT AERO Inhale 2 puffs into the lungs 2 times daily 1 Inhaler 3    fluticasone (FLONASE) 50 MCG/ACT nasal spray 2 sprays by Nasal route daily      loperamide (IMODIUM) 2 MG capsule Take 2 mg by mouth 2 times daily as needed for Diarrhea       fluticasone (CUTIVATE) 0.05 % cream Apply topically 2 times daily as needed. 60 g 3     No current facility-administered medications for this visit. Allergies:  Diovan [valsartan], Ezetimibe-simvastatin, Ezetimibe-simvastatin, and Clarithromycin     Social History:  Social History     Socioeconomic History    Marital status:      Spouse name: Not on file    Number of children: Not on file    Years of education: Not on file    Highest education level: Not on file   Occupational History    Not on file   Tobacco Use    Smoking status: Former     Packs/day: 2.50     Years: 33.00     Pack years: 82.50     Types: Cigarettes     Quit date: 12/4/2006     Years since quitting: 15.8    Smokeless tobacco: Never    Tobacco comments:     smoked for 33 yrs / smoked up to 2.5   Vaping Use    Vaping Use: Never used   Substance and Sexual Activity    Alcohol use:  Yes     Alcohol/week: 0.0 standard drinks     Comment: occasional alcohol use     Drug use: No    Sexual activity: Not on file   Other Topics Concern    Not on file   Social History Narrative    Not on file     Social Determinants of Health     Financial Resource Strain: Not on file   Food Insecurity: Not on file Transportation Needs: Not on file   Physical Activity: Not on file   Stress: Not on file   Social Connections: Not on file   Intimate Partner Violence: Not on file   Housing Stability: Not on file       Family History:   Family History   Problem Relation Age of Onset    Emphysema Mother     Stroke Father 76    Cancer Father     Kidney Disease Brother         renal cancer    High Blood Pressure Brother     Prostate Cancer Brother     Prostate Cancer Brother      Family history has been reviewed and not pertinent except as noted above. Review of Systems:   Constitutional: there has been no unanticipated weight loss. No change in energy or activity level   Eyes: No visual changes   ENT: No Headaches, hearing loss or vertigo. No mouth sores or sore throat. Cardiovascular: Reviewed in HPI  Respiratory: No cough or wheezing, no sputum production. Gastrointestinal: No abdominal pain, appetite loss, blood in stools. No change in bowel or bladder habits. Genitourinary: No nocturia, dysuria, trouble voiding  Musculoskeletal:  No gait disturbance, weakness or joint complaints. Integumentary: No rash or pruritis. Neurological: No headache, change in muscle strength, numbness or tingling. No change in gait, balance, coordination, mood, affect, memory, mentation, behavior. Psychiatric: No anxiety or depression  Endocrine: No malaise or fever  Hematologic/Lymphatic: No abnormal bruising or bleeding, blood clots or swollen lymph nodes. Allergic/Immunologic: No nasal congestion or hives. Physical Examination:    Vitals:    09/30/22 0940   BP: 128/62   Site: Left Upper Arm   Position: Sitting   Cuff Size: Medium Adult   Pulse: 52   SpO2: 99%   Weight: 193 lb 3.2 oz (87.6 kg)   Height: 5' 10\" (1.778 m)     Body mass index is 27.72 kg/m².      Wt Readings from Last 3 Encounters:   09/30/22 193 lb 3.2 oz (87.6 kg)   09/19/22 194 lb 3.2 oz (88.1 kg)   07/15/22 197 lb (89.4 kg)      BP Readings from Last 3 Encounters: 09/30/22 128/62   09/19/22 (!) 160/80   07/15/22 102/62        Physical Examination:    CONSTITUTIONAL: Well developed, well nourished  EYES: PERRLA. No xanthelasma, sclera non icteric  EARS,NOSE,MOUTH,THROAT:  Mucous membranes moist, normal hearing  NECK: Supple, JVP normal, thyroid not enlarged. Carotids 2+ without bruits  RESPIRATORY: Normal effort, no rales or rhonchi  CARDIOVASCULAR: Normal PMI, regular rate and rhythm, no murmurs, rub or gallop. No edema. Radial pulses present and equal  CHEST: No scar or masses  ABDOMEN: Normal bowel sounds. No masses or tenderness. No bruit  MUSCULOSKELETAL: No clubbing or cyanosis. Moves all extremities well. Normal gait  SKIN:  Warm and dry. No rashes  NEUROLOGIC: Cranial nerves intact. Alert and oriented  PSYCHIATRIC: Calm affect. Appears to have normal judgement and insight    All testing and labs listed below were personally reviewed by myself. SPECT 2016  Summary  Normal myocardial perfusion study. Normal LV function. Good exercise tolerance. Chest pressure with exercise. Assessment/Plan  1. Chest pain, unspecified type    2. Bradycardia    3. Paroxysmal atrial fibrillation (HCC)    4. Shortness of breath        Chest pain  Atypical  Plan~ repeat stress lexiscan     SOB  Possibly due to COPD? CAD? could be multi factorial.  Plan~ lexiscan     PAF  Ekg 12/2020 revealed Afib with RVR  Monitor 4/2021 PAC 1/5 PAT  Monitor 8/2022 SR Symptoms including chest pain reported with sinus rhythm, 33 short artrial runs, longest 40 beats. - no symptoms.  One episode NSVT   On Eliquis   Follows with EP     Bradycardia   Metoprolol reduced to 12.5 mg bid by Dr Draper Meth   Plan~ stop metoprolol     Htn  /62 (Site: Left Upper Arm, Position: Sitting, Cuff Size: Medium Adult)   Pulse 52   Ht 5' 10\" (1.778 m)   Wt 193 lb 3.2 oz (87.6 kg)   SpO2 99%   BMI 27.72 kg/m²   Meds~ nifedipine / losartan  Plan~ stable     Hld  7/13/22       LDL  77   HDL 45  Meds~ lovastatin / CoQ 10  Plan~ stable      Former smoker  Quit 16 yrs ago   Smoked for 30+ yrs     COPD  Follows Dr Krystle Elias      Follow up 6 months    Orders Placed This Encounter   Procedures    Stress test Mary Barry)           Delmar Khan MD      Thank you for allowing to me to participate in the care of Ramona Gibbs.     Scribe's Attestation: This note was scribed in the presence of Dr. Glover Heimlich, MD by Katie Askew RN.     I, Dr. Glover Heimlich, personally performed the services described in this documentation, as scribed by the above signed scribe in my presence. It is both accurate and complete to my knowledge. I agree with the details independently gathered by the clinical support staff, while the remaining scribed note accurately describes my personal service to the patient.

## 2022-09-30 ENCOUNTER — OFFICE VISIT (OUTPATIENT)
Dept: CARDIOLOGY CLINIC | Age: 67
End: 2022-09-30
Payer: COMMERCIAL

## 2022-09-30 VITALS
SYSTOLIC BLOOD PRESSURE: 128 MMHG | WEIGHT: 193.2 LBS | OXYGEN SATURATION: 99 % | HEART RATE: 52 BPM | DIASTOLIC BLOOD PRESSURE: 62 MMHG | BODY MASS INDEX: 27.66 KG/M2 | HEIGHT: 70 IN

## 2022-09-30 DIAGNOSIS — I48.0 PAROXYSMAL ATRIAL FIBRILLATION (HCC): ICD-10-CM

## 2022-09-30 DIAGNOSIS — R07.9 CHEST PAIN, UNSPECIFIED TYPE: Primary | ICD-10-CM

## 2022-09-30 DIAGNOSIS — R06.02 SHORTNESS OF BREATH: ICD-10-CM

## 2022-09-30 DIAGNOSIS — R00.1 BRADYCARDIA: ICD-10-CM

## 2022-09-30 PROCEDURE — 99214 OFFICE O/P EST MOD 30 MIN: CPT | Performed by: INTERNAL MEDICINE

## 2022-09-30 RX ORDER — NIFEDIPINE 60 MG/1
TABLET, EXTENDED RELEASE ORAL
COMMUNITY
Start: 2022-09-20

## 2022-09-30 NOTE — LETTER
[address]  [address]  Dept: [phone]  Dept Fax: [phone]      2022    Patient: Rick Dominguez  : 1955  DOS: 2022    To Whom it May Concern:     Rick Dominguez was seen in office today accompanied by his wife. If you have any questions or concerns, please do not hesitate to call.     Sincerely,    Betty Daugherty RN

## 2022-10-17 RX ORDER — FLUTICASONE PROPIONATE 0.05 %
CREAM (GRAM) TOPICAL
Qty: 60 G | Refills: 3 | Status: SHIPPED | OUTPATIENT
Start: 2022-10-17

## 2022-10-31 ENCOUNTER — HOSPITAL ENCOUNTER (OUTPATIENT)
Dept: NON INVASIVE DIAGNOSTICS | Age: 67
Discharge: HOME | End: 2022-10-31
Payer: COMMERCIAL

## 2022-10-31 DIAGNOSIS — R06.02 SHORTNESS OF BREATH: ICD-10-CM

## 2022-10-31 DIAGNOSIS — R07.9 CHEST PAIN, UNSPECIFIED TYPE: ICD-10-CM

## 2022-10-31 LAB
LV EF: 65 %
LVEF MODALITY: NORMAL

## 2022-10-31 PROCEDURE — 6360000002 HC RX W HCPCS: Performed by: INTERNAL MEDICINE

## 2022-10-31 PROCEDURE — 93017 CV STRESS TEST TRACING ONLY: CPT | Performed by: INTERNAL MEDICINE

## 2022-10-31 PROCEDURE — A9502 TC99M TETROFOSMIN: HCPCS | Performed by: INTERNAL MEDICINE

## 2022-10-31 PROCEDURE — 78452 HT MUSCLE IMAGE SPECT MULT: CPT | Performed by: INTERNAL MEDICINE

## 2022-10-31 PROCEDURE — 3430000000 HC RX DIAGNOSTIC RADIOPHARMACEUTICAL: Performed by: INTERNAL MEDICINE

## 2022-10-31 RX ORDER — AMINOPHYLLINE DIHYDRATE 25 MG/ML
100 INJECTION, SOLUTION INTRAVENOUS ONCE
Status: COMPLETED | OUTPATIENT
Start: 2022-10-31 | End: 2022-10-31

## 2022-10-31 RX ADMIN — TETROFOSMIN 10 MILLICURIE: 1.38 INJECTION, POWDER, LYOPHILIZED, FOR SOLUTION INTRAVENOUS at 07:42

## 2022-10-31 RX ADMIN — REGADENOSON 0.4 MG: 0.08 INJECTION, SOLUTION INTRAVENOUS at 08:32

## 2022-10-31 RX ADMIN — AMINOPHYLLINE 100 MG: 25 INJECTION, SOLUTION INTRAVENOUS at 08:47

## 2022-10-31 RX ADMIN — TETROFOSMIN 30 MILLICURIE: 1.38 INJECTION, POWDER, LYOPHILIZED, FOR SOLUTION INTRAVENOUS at 08:40

## 2022-11-25 ENCOUNTER — TELEPHONE (OUTPATIENT)
Dept: CARDIOLOGY CLINIC | Age: 67
End: 2022-11-25

## 2022-11-25 ENCOUNTER — TELEPHONE (OUTPATIENT)
Dept: FAMILY MEDICINE CLINIC | Age: 67
End: 2022-11-25

## 2022-11-25 DIAGNOSIS — R89.9 ABNORMAL LABORATORY TEST: Primary | ICD-10-CM

## 2022-11-25 NOTE — TELEPHONE ENCOUNTER
Spouse called stating that patient needs blood work orders put in. She stated that he has an appointment with  that he will need blood work completed for.        Thank you

## 2022-11-25 NOTE — TELEPHONE ENCOUNTER
CARDIAC CLEARANCE     What type of procedure are you having? Shoulder     Which physician is performing your procedure? Dr. Shanae Jackson    When is your procedure scheduled for? Not scheduled sometime in dec. Where are you [facility] this [facility]    Are you taking Blood Thinners? yes   If so what? (Name/dose/frequesncy)  Eliquis 5mg   Does the surgeon want you to stop your blood thinner? Up to cardio If so for how long? Not mentioned yet    Phone Number and Contact Name for Physicians office: [phone]    Fax number to send information:    Pt ask to be notified if they need to be seen and if they need to stop eliquis or not.

## 2022-11-28 NOTE — TELEPHONE ENCOUNTER
Called and spoke with pt. MELVINA out of office. Confirmed surgery has not been scheduled. Let him know I would discuss clearance with [facility] when he returns next week.

## 2022-11-28 NOTE — TELEPHONE ENCOUNTER
Patient is going to have his wife call us back because there is specific blood tests that need ordered

## 2022-11-28 NOTE — TELEPHONE ENCOUNTER
He went and had labs done from his kidney specialist and it said that smudge cells are present. His kidney specialist said to follow-up with his PCP and see if he needed the CBC repeated or not. Pt and wife are concerned about this. Will go to [facility] lab .  Please advise if needed    Please advise wife

## 2022-11-29 ENCOUNTER — TELEPHONE (OUTPATIENT)
Dept: PULMONOLOGY | Age: 67
End: 2022-11-29

## 2022-11-29 ENCOUNTER — TELEPHONE (OUTPATIENT)
Dept: CARDIOLOGY CLINIC | Age: 67
End: 2022-11-29

## 2022-11-29 NOTE — TELEPHONE ENCOUNTER
CARDIAC CLEARANCE     What type of procedure are you having? Right shoulder scope under general     Which physician is performing your procedure? Dr. Kelsey Dumont    When is your procedure scheduled for? 12/8/22    Where are you having this procedure? [facility]    Are you taking Blood Thinners? yes   If so what? Bianca    Does the surgeon want you to stop your blood thinner? YES    If so for how long?  72 hrs prior and go back on immediately    Phone Number and Contact Name for Physicians office: Karthik Li [phone]    Fax number to send information: [phone]

## 2022-11-30 DIAGNOSIS — R89.9 ABNORMAL LABORATORY TEST: ICD-10-CM

## 2022-11-30 DIAGNOSIS — N18.30 CHRONIC RENAL FAILURE, STAGE 3 (MODERATE), UNSPECIFIED WHETHER STAGE 3A OR 3B CKD (HCC): ICD-10-CM

## 2022-11-30 LAB
BASOPHILS ABSOLUTE: 0 K/UL (ref 0–0.2)
BASOPHILS RELATIVE PERCENT: 0.4 %
EOSINOPHILS ABSOLUTE: 0.2 K/UL (ref 0–0.6)
EOSINOPHILS RELATIVE PERCENT: 2.1 %
HCT VFR BLD CALC: 46.8 % (ref 40.5–52.5)
HEMOGLOBIN: 15.2 G/DL (ref 13.5–17.5)
LYMPHOCYTES ABSOLUTE: 2.2 K/UL (ref 1–5.1)
LYMPHOCYTES RELATIVE PERCENT: 26.1 %
MCH RBC QN AUTO: 29.7 PG (ref 26–34)
MCHC RBC AUTO-ENTMCNC: 32.5 G/DL (ref 31–36)
MCV RBC AUTO: 91.3 FL (ref 80–100)
MONOCYTES ABSOLUTE: 0.8 K/UL (ref 0–1.3)
MONOCYTES RELATIVE PERCENT: 9.7 %
NEUTROPHILS ABSOLUTE: 5.1 K/UL (ref 1.7–7.7)
NEUTROPHILS RELATIVE PERCENT: 61.7 %
PDW BLD-RTO: 15.2 % (ref 12.4–15.4)
PLATELET # BLD: 259 K/UL (ref 135–450)
PMV BLD AUTO: 9.7 FL (ref 5–10.5)
RBC # BLD: 5.12 M/UL (ref 4.2–5.9)
WBC # BLD: 8.2 K/UL (ref 4–11)

## 2022-12-01 ENCOUNTER — OFFICE VISIT (OUTPATIENT)
Dept: FAMILY MEDICINE CLINIC | Age: 67
End: 2022-12-01
Payer: COMMERCIAL

## 2022-12-01 VITALS
TEMPERATURE: 98.1 F | OXYGEN SATURATION: 98 % | HEART RATE: 75 BPM | SYSTOLIC BLOOD PRESSURE: 130 MMHG | BODY MASS INDEX: 28.07 KG/M2 | DIASTOLIC BLOOD PRESSURE: 60 MMHG | WEIGHT: 195.6 LBS

## 2022-12-01 DIAGNOSIS — M10.072 ACUTE IDIOPATHIC GOUT INVOLVING TOE OF LEFT FOOT: Primary | ICD-10-CM

## 2022-12-01 PROCEDURE — 3074F SYST BP LT 130 MM HG: CPT | Performed by: FAMILY MEDICINE

## 2022-12-01 PROCEDURE — 3078F DIAST BP <80 MM HG: CPT | Performed by: FAMILY MEDICINE

## 2022-12-01 PROCEDURE — 1123F ACP DISCUSS/DSCN MKR DOCD: CPT | Performed by: FAMILY MEDICINE

## 2022-12-01 PROCEDURE — 99213 OFFICE O/P EST LOW 20 MIN: CPT | Performed by: FAMILY MEDICINE

## 2022-12-01 RX ORDER — COLCHICINE 0.6 MG/1
TABLET ORAL
Qty: 30 TABLET | Refills: 3 | Status: SHIPPED | OUTPATIENT
Start: 2022-12-01

## 2022-12-01 NOTE — PROGRESS NOTES
Patient is here for pain in his left great toe, symptoms started a couple of days ago. He also had this a couple of weeks ago, went away, and came back. Wife said this is the same toe that he had \"grey\" toe syndrome. Swollen and red. States kidney doctors had put him on allopurinol in past as uric acid a little high but he stopped it about a year ago. States this is 3rd or 4th time had sx in past year. Vitals:    12/01/22 1224   BP: 130/60   Site: Left Upper Arm   Position: Sitting   Cuff Size: Medium Adult   Pulse: 75   Temp: 98.1 °F (36.7 °C)   TempSrc: Infrared   SpO2: 98%   Weight: 195 lb 9.6 oz (88.7 kg)     Wt Readings from Last 3 Encounters:   12/05/22 201 lb 9.6 oz (91.4 kg)   12/02/22 197 lb 6 oz (89.5 kg)   12/01/22 195 lb 9.6 oz (88.7 kg)     Body mass index is 28.07 kg/m². PHQ Scores 7/15/2022 11/29/2021 9/20/2021 6/14/2021 3/16/2021 2/5/2021 7/27/2020   PHQ2 Score 0 0 1 0 0 0 0   PHQ9 Score 0 0 1 0 0 0 0       Interpretation of Total Score Depression Severity: 1-4 = Minimal depression, 5-9 = Mild depression, 10-14 = Moderate depression, 15-19 = Moderately severe depression, 20-27 = Severe depression       GEN: Alert and oriented x 4 NAD, affect appropriate and overweight, well hydrated, well developed. Left great toe and 1st MTP mild red, swollen and very tender palpation      ASSESSMENT AND PLAN:       Mago Boles was seen today for pain and joint pain. Diagnoses and all orders for this visit:    Acute idiopathic gout involving toe of left foot  -     colchicine (COLCRYS) 0.6 MG tablet; Take 2 tabs first day and then another tablet few hours later then 1 tab daily until symptoms resolved.     Trial colcrys  If sx improved in a month restart allopurinol  Call if not improving        Portions of Note per  SALLY Tomas with corrections and edits per Dg Brock MD.  I agree with entirety of note and was present and performed history and physical.  I also confirm that the note above accurately reflects all work, treatment, procedures, and medical decision making performed by me, Shavonne Ashby MD

## 2022-12-01 NOTE — TELEPHONE ENCOUNTER
Called patient back per Meir Chavarria offer 9-19-22 @ 1:45   Patient took appointment and verbalized understanding.
Called pt and no answer and voicemail not set up yet, Will try again later.
Patient wife called back about the message below and she is very upset because nothing is getting done she was given the message below and was placed to have the 14 day monitor on. Patient wife is asking for a sooner appointment to see RMM. I don't see any appointment sooner then October. Will take this message to the EP nurses.
Pt's wife states kidney dr added Losartan and pt's bp is still high 170's 180's over 80's with HR between 48-52. Also refer to message below. Please see and advise.
Reduce metoprolol to 12.5 mg BID. Do 14 day holter to correlate symptoms.  Defer BP management to his nephrologist.    Janny River, APRN-CNP
Home

## 2022-12-02 ENCOUNTER — OFFICE VISIT (OUTPATIENT)
Dept: FAMILY MEDICINE CLINIC | Age: 67
End: 2022-12-02

## 2022-12-02 VITALS
BODY MASS INDEX: 28.32 KG/M2 | WEIGHT: 197.38 LBS | OXYGEN SATURATION: 97 % | TEMPERATURE: 97.3 F | SYSTOLIC BLOOD PRESSURE: 132 MMHG | HEART RATE: 72 BPM | RESPIRATION RATE: 12 BRPM | DIASTOLIC BLOOD PRESSURE: 64 MMHG

## 2022-12-02 DIAGNOSIS — I10 ESSENTIAL HYPERTENSION: ICD-10-CM

## 2022-12-02 DIAGNOSIS — J43.9 PULMONARY EMPHYSEMA, UNSPECIFIED EMPHYSEMA TYPE (HCC): ICD-10-CM

## 2022-12-02 DIAGNOSIS — N18.30 CHRONIC RENAL FAILURE, STAGE 3 (MODERATE), UNSPECIFIED WHETHER STAGE 3A OR 3B CKD (HCC): ICD-10-CM

## 2022-12-02 DIAGNOSIS — I48.0 PAROXYSMAL ATRIAL FIBRILLATION (HCC): ICD-10-CM

## 2022-12-02 DIAGNOSIS — Z01.818 PREOP EXAMINATION: ICD-10-CM

## 2022-12-02 DIAGNOSIS — M75.101 TEAR OF RIGHT ROTATOR CUFF, UNSPECIFIED TEAR EXTENT, UNSPECIFIED WHETHER TRAUMATIC: Primary | ICD-10-CM

## 2022-12-02 PROBLEM — R00.1 BRADYCARDIA: Status: RESOLVED | Noted: 2022-09-13 | Resolved: 2022-12-02

## 2022-12-02 RX ORDER — LOSARTAN POTASSIUM 100 MG/1
100 TABLET ORAL DAILY
Status: SHIPPED | COMMUNITY
Start: 2022-12-02

## 2022-12-02 NOTE — PROGRESS NOTES
Preoperative Consultation    Crissy Fregoso  YOB: 1955    This patient presents to the office today for a preoperative consultation at the request of surgeon, Dr. Foreign Pena, who plans on performing right rotator cuff repair on December 8 at [facility]. Patient's not using the Bevespi inhaler regularly. He does use the albuterol Hailer and Bevespi inhaler when he has issues.     Planned anesthesia: General and Paracervical block   Known anesthesia problems: None   Bleeding risk: No recent or remote history of abnormal bleeding  Personal or FH of DVT/PE: Yes - on eliquis      Patient Active Problem List   Diagnosis    Anxiety state    Essential hypertension    Generalized osteoarthrosis, involving multiple sites    Mixed hyperlipidemia    Rosacea    COPD (chronic obstructive pulmonary disease) ([facility])    Hypothyroid    Chest pain    Urinary hesitancy due to benign prostatic hyperplasia    Primary osteoarthritis of right knee    Chronic renal failure, stage 3 (moderate) ([facility])    Pinguecula    Paroxysmal atrial fibrillation ([facility])    PVD (peripheral vascular disease) ([facility])    H/O thrombosis    Bradycardia     Past Surgical History:   Procedure Laterality Date    ABDOMINAL EXPLORATION SURGERY  12/2020    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    ARM SURGERY Right 1986    Forearm fracture surgery    CLAVICLE EXCISION Left        Allergies   Allergen Reactions    Diovan [Valsartan]     Ezetimibe-Simvastatin     Ezetimibe-Simvastatin     Clarithromycin Rash     itching  itching  itching  itching     Outpatient Medications Marked as Taking for the 12/2/22 encounter (Office Visit) with Chris Sands MD   Medication Sig Dispense Refill    apixaban (ELIQUIS) 5 MG TABS tablet Take 1 tablet by mouth 2 times daily Take 5 mg by mouth 2 times daily 180 tablet 3    losartan (COZAAR) 100 MG tablet Take 1 tablet by mouth daily      colchicine (COLCRYS) 0.6 MG tablet Take 2 tabs first day and then another tablet few hours later then 1 tab daily until symptoms resolved. 30 tablet 3    fluticasone (CUTIVATE) 0.05 % cream Apply topically 2 times daily as needed. 60 g 3    NIFEdipine (PROCARDIA XL) 60 MG extended release tablet       levothyroxine (SYNTHROID) 50 MCG tablet TAKE 1 TABLET DAILY 90 tablet 3    lovastatin (MEVACOR) 10 MG tablet TAKE 1 TABLET NIGHTLY 90 tablet 1    Coenzyme Q10 (CO Q 10 PO) Take by mouth      albuterol sulfate HFA (PROAIR HFA) 108 (90 Base) MCG/ACT inhaler Inhale 2 puffs into the lungs every 6 hours as needed for Wheezing 1 Inhaler 5    azelastine (ASTELIN) 0.1 % nasal spray 1 spray by Nasal route daily Use in each nostril as directed 1 Bottle     glycopyrrolate-formoterol (BEVESPI AEROSPHERE) 9-4.8 MCG/ACT AERO Inhale 2 puffs into the lungs 2 times daily 1 Inhaler 3    fluticasone (FLONASE) 50 MCG/ACT nasal spray 2 sprays by Nasal route daily         Social History     Tobacco Use    Smoking status: Former     Packs/day: 2.50     Years: 33.00     Pack years: 82.50     Types: Cigarettes     Quit date: 2006     Years since quittin.0    Smokeless tobacco: Never    Tobacco comments:     smoked for 33 yrs / smoked up to 2.5   Substance Use Topics    Alcohol use: Yes     Alcohol/week: 0.0 standard drinks     Comment: occasional alcohol use      Family History   Problem Relation Age of Onset    Emphysema Mother     Stroke Father 76    Cancer Father     Kidney Disease Brother         renal cancer    High Blood Pressure Brother     Prostate Cancer Brother     Prostate Cancer Brother        Review of Systems  A comprehensive review of systems was negative except for what was noted in the HPI.      Physical Exam   /64 (Site: Right Upper Arm, Position: Sitting, Cuff Size: Large Adult)   Pulse 72   Temp 97.3 °F (36.3 °C) (Infrared)   Resp 12   Wt 197 lb 6 oz (89.5 kg)   SpO2 97%   BMI 28.32 kg/m²   Weight: 197 lb 6 oz (89.5 kg)   Constitutional: He is oriented to person, place, and time. He appears well-developed and well-nourished. No distress. HENT:   Head: Normocephalic and atraumatic. Mouth/Throat: Uvula is midline, oropharynx is clear and moist and mucous membranes are normal.   Eyes: Conjunctivae and EOM are normal. Pupils are equal, round, and reactive to light. Neck: Trachea normal and normal range of motion. Neck supple. No JVD present. Carotid bruit is not present. No mass and no thyromegaly present. Cardiovascular: Normal rate, regular rhythm, normal heart sounds and intact distal pulses. Exam reveals no gallop and no friction rub. No murmur heard. Pulmonary/Chest: Effort normal and breath sounds normal. No respiratory distress. He has no wheezes. He has no rales. Scattered rhonchi diffusely  Abdominal: Soft. Normal aorta and bowel sounds are normal. He exhibits no distension and no mass. There is no hepatosplenomegaly. No tenderness. Musculoskeletal: He exhibits no edema and no tenderness. Neurological: He is alert and oriented to person, place, and time. He has normal strength. No cranial nerve deficit or sensory deficit. Coordination and gait normal.   Skin: Skin is warm and dry. No rash noted. No erythema. Lab Review Yes       Assessment:       Liss Grullon was seen today for pre-op exam.    Diagnoses and all orders for this visit:    Tear of right rotator cuff, unspecified tear extent, unspecified whether traumatic    Preop examination    Essential hypertension    Pulmonary emphysema, unspecified emphysema type ([address]    Chronic renal failure, stage 3 (moderate), unspecified whether stage 3a or 3b CKD ([facility])    Paroxysmal atrial fibrillation ([address]    Other orders  -     apixaban (ELIQUIS) 5 MG TABS tablet; Take 1 tablet by mouth 2 times daily Take 5 mg by mouth 2 times daily    74 y.o. patient  approved for Surgery         Plan:     1. Preoperative workup as follows: none  2.  Change in medication regimen before surgery: Discontinue Eliquis 2 days before surgery and resume day after surgery  3. No contraindications to planned surgery  4. Medical decision making of moderate complexity. 5 pt was advised to use Bevespi inhaer regularly    Note electronically signed by provider.

## 2022-12-05 ENCOUNTER — HOSPITAL ENCOUNTER (OUTPATIENT)
Age: 67
Discharge: HOME | End: 2022-12-05
Payer: COMMERCIAL

## 2022-12-05 ENCOUNTER — OFFICE VISIT (OUTPATIENT)
Dept: PULMONOLOGY | Age: 67
End: 2022-12-05
Payer: COMMERCIAL

## 2022-12-05 ENCOUNTER — HOSPITAL ENCOUNTER (OUTPATIENT)
Dept: GENERAL RADIOLOGY | Age: 67
Discharge: HOME | End: 2022-12-05
Payer: COMMERCIAL

## 2022-12-05 VITALS
SYSTOLIC BLOOD PRESSURE: 138 MMHG | HEIGHT: 70 IN | DIASTOLIC BLOOD PRESSURE: 76 MMHG | WEIGHT: 201.6 LBS | BODY MASS INDEX: 28.86 KG/M2 | HEART RATE: 67 BPM | OXYGEN SATURATION: 98 %

## 2022-12-05 DIAGNOSIS — J44.9 COPD, MODERATE (HCC): ICD-10-CM

## 2022-12-05 DIAGNOSIS — Z87.891 SMOKING HISTORY: Primary | ICD-10-CM

## 2022-12-05 DIAGNOSIS — Z01.811 PREOP PULMONARY/RESPIRATORY EXAM: ICD-10-CM

## 2022-12-05 PROCEDURE — 3074F SYST BP LT 130 MM HG: CPT | Performed by: INTERNAL MEDICINE

## 2022-12-05 PROCEDURE — 1123F ACP DISCUSS/DSCN MKR DOCD: CPT | Performed by: INTERNAL MEDICINE

## 2022-12-05 PROCEDURE — 71046 X-RAY EXAM CHEST 2 VIEWS: CPT

## 2022-12-05 PROCEDURE — 99213 OFFICE O/P EST LOW 20 MIN: CPT | Performed by: INTERNAL MEDICINE

## 2022-12-05 PROCEDURE — 3078F DIAST BP <80 MM HG: CPT | Performed by: INTERNAL MEDICINE

## 2022-12-05 RX ORDER — LOVASTATIN 10 MG/1
TABLET ORAL
Qty: 90 TABLET | Refills: 1 | Status: SHIPPED | OUTPATIENT
Start: 2022-12-05

## 2022-12-05 ASSESSMENT — ENCOUNTER SYMPTOMS
SORE THROAT: 0
VOICE CHANGE: 0
CHEST TIGHTNESS: 0
DIARRHEA: 0
BLOOD IN STOOL: 0
ABDOMINAL PAIN: 0
WHEEZING: 0
ANAL BLEEDING: 0
BACK PAIN: 0
CONSTIPATION: 0
SINUS PRESSURE: 0
SHORTNESS OF BREATH: 0
RHINORRHEA: 0
COUGH: 1
ABDOMINAL DISTENTION: 0
APNEA: 0
CHOKING: 0
STRIDOR: 0

## 2022-12-05 NOTE — PROGRESS NOTES
John Rodriguez    YOB: 1955     Date of Service:  12/5/2022     Chief Complaint   Patient presents with    Pre-op Exam     RIGHT SHOULDER DR Tra Shaw         HPI patient is here along with his wife for preoperative pulmonary clearance for the proposed right shoulder arthroscopic rotator cuff surgery under general anesthesia 12/8. Patient states that he does have a cough when he lies down associated with some mucoid phlegm-but this has been chronic. Denies any significant shortness of breath or chest pain. Denies wheezing or any fevers. Allergies   Allergen Reactions    Diovan [Valsartan]     Ezetimibe-Simvastatin     Ezetimibe-Simvastatin     Clarithromycin Rash     itching  itching  itching  itching     Outpatient Medications Marked as Taking for the 12/5/22 encounter (Office Visit) with Taina Fenton MD   Medication Sig Dispense Refill    glycopyrrolate-formoterol (BEVESPI AEROSPHERE) 9-4.8 MCG/ACT AERO Inhale 2 puffs into the lungs 2 times daily 3 each 3    apixaban (ELIQUIS) 5 MG TABS tablet Take 1 tablet by mouth 2 times daily Take 5 mg by mouth 2 times daily 180 tablet 3    losartan (COZAAR) 100 MG tablet Take 1 tablet by mouth daily      colchicine (COLCRYS) 0.6 MG tablet Take 2 tabs first day and then another tablet few hours later then 1 tab daily until symptoms resolved. 30 tablet 3    fluticasone (CUTIVATE) 0.05 % cream Apply topically 2 times daily as needed.  60 g 3    NIFEdipine (PROCARDIA XL) 60 MG extended release tablet       levothyroxine (SYNTHROID) 50 MCG tablet TAKE 1 TABLET DAILY 90 tablet 3    [DISCONTINUED] lovastatin (MEVACOR) 10 MG tablet TAKE 1 TABLET NIGHTLY 90 tablet 1    Coenzyme Q10 (CO Q 10 PO) Take by mouth      albuterol sulfate HFA (PROAIR HFA) 108 (90 Base) MCG/ACT inhaler Inhale 2 puffs into the lungs every 6 hours as needed for Wheezing 1 Inhaler 5    azelastine (ASTELIN) 0.1 % nasal spray 1 spray by Nasal route daily Use in each nostril as directed 1 Bottle     fluticasone (FLONASE) 50 MCG/ACT nasal spray 2 sprays by Nasal route daily         Immunization History   Administered Date(s) Administered    Influenza Virus Vaccine 10/21/2010, 10/21/2011, 10/03/2012, 11/28/2014       Past Medical History:   Diagnosis Date    Anxiety state, unspecified     Asthma     Back problem     Chronic airway obstruction, not elsewhere classified     Generalized osteoarthrosis, involving multiple sites     Insomnia, unspecified     Other and unspecified hyperlipidemia     Rosacea     Thyroid disease     Unspecified essential hypertension      Past Surgical History:   Procedure Laterality Date    ABDOMINAL EXPLORATION SURGERY  12/2020    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 HENT:  Negative for congestion, ear discharge, ear pain, postnasal drip, rhinorrhea, sinus pressure, sneezing, sore throat, tinnitus and voice change. Respiratory:  Positive for cough. Negative for apnea, choking, chest tightness, shortness of breath, wheezing and stridor. Cardiovascular:  Negative for chest pain, palpitations and leg swelling. Gastrointestinal:  Negative for abdominal distention, abdominal pain, anal bleeding, blood in stool, constipation and diarrhea. Musculoskeletal:  Negative for arthralgias, back pain and gait problem. Skin:  Negative for pallor and rash. Allergic/Immunologic: Negative for environmental allergies.    Neurological:  Negative for dizziness, tremors, seizures, syncope, speech difficulty, weakness, light-headedness, numbness and headaches. Hematological:  Negative for adenopathy. Does not bruise/bleed easily. Psychiatric/Behavioral:  Negative for sleep disturbance. Vitals:    12/05/22 1643   BP: 138/76   Pulse: 67   SpO2: 98%   Weight: 201 lb 9.6 oz (91.4 kg)   Height: 5' 10\" (1.778 m)     Patient-Reported Vitals 11/19/2020   Patient-Reported Systolic 465   Patient-Reported Diastolic 90      Body mass index is 28.93 kg/m². Wt Readings from Last 3 Encounters:   12/05/22 201 lb 9.6 oz (91.4 kg)   12/02/22 197 lb 6 oz (89.5 kg)   12/01/22 195 lb 9.6 oz (88.7 kg)     BP Readings from Last 3 Encounters:   12/05/22 138/76   12/02/22 132/64   12/01/22 130/60         Physical Exam  Constitutional:       General: He is not in acute distress. Appearance: He is well-developed. He is not ill-appearing or diaphoretic. HENT:      Mouth/Throat:      Pharynx: No oropharyngeal exudate. Cardiovascular:      Rate and Rhythm: Normal rate and regular rhythm. Heart sounds: Normal heart sounds. No murmur heard. No friction rub. Pulmonary:      Effort: No respiratory distress. Breath sounds: Rales present. No wheezing or rhonchi. Chest:      Chest wall: No tenderness. Abdominal:      General: There is no distension. Palpations: There is no mass. Tenderness: There is no abdominal tenderness. There is no guarding or rebound. Musculoskeletal:         General: No swelling or signs of injury. Skin:     Coloration: Skin is not pale. Findings: No erythema or rash. Neurological:      Mental Status: He is alert and oriented to person, place, and time. Cranial Nerves: No cranial nerve deficit. Motor: No abnormal muscle tone.       Coordination: Coordination normal.      Deep Tendon Reflexes: Reflexes normal.           Health Maintenance   Topic Date Due    Pneumococcal 65+ years Vaccine (1 - PCV) Never done    HIV screen  Never done    Hepatitis C screen  Never done    DTaP/Tdap/Td vaccine (1 - Tdap) Never done    Diabetes screen  Never done    COVID-19 Vaccine (1) 01/16/2023 (Originally 4/27/1956)    Shingles vaccine (1 of 2) 07/15/2023 (Originally 10/27/2007)    Flu vaccine (1) 12/02/2023 (Originally 8/1/2022)    Lipids  07/13/2023    Depression Screen  07/15/2023    Colorectal Cancer Screen  09/28/2028    AAA screen  Completed    Hepatitis A vaccine  Aged Out    Hib vaccine  Aged Out    Meningococcal (ACWY) vaccine  Aged Out          Assessment/Plan:     Diagnosis Orders   1. Smoking history  CT Lung Screen (Initial or Annual)      2. COPD, moderate ([address]  XR CHEST STANDARD (2 VW)      3.  Preoperative pulmonary clearance    COPD moderate, prior PFT from July 2020 at [facility] revealed FEV1 of 1.87 L [55% predicted]. Has not undergone a repeat PFT study which was requested before. Currently does not appear to be in exacerbation, though he has some left basilar crackles on examination. Will obtain chest x-ray in order to rule out any new infiltrates. Hold off on antibiotics or steroids. LDCT lung screening is due in February 2023-bilateral lung nodules noted previously. We will obtain chest x-ray before a final determination is made about patient's suitability for surgery. Return in about 4 months (around 4/5/2023). Addendum: Reviewed chest x-ray, no evidence of acute infiltrates or pneumonia. Patient is okay to pursue orthopedic surgery from pulmonary standpoint.

## 2022-12-06 NOTE — PROGRESS NOTES
Patient's wife Jessica Monae called to verify the medications that her  should take the morning of surgery. Went over her 's medications with her. She verbalized understanding. She was wondering when patient should restart the eliquis and I requested that she call Jacque Desai at Dr. Adilson Alvarenga office to ask their advice.

## 2022-12-06 NOTE — PROGRESS NOTES
Name_______________________________________Printed:____________________  Date and time of surgery___12/8/22  1330  MAIN_____________________Arrival Time:_1200_______________   1. The instructions given regarding when and if a patient needs to stop oral intake prior to surgery varies. Follow the specific instructions you were given                  _x__Nothing to eat or to drink after Midnight the night before.                   ____Carbo loading or ERAS instructions will be given to select patients-if you have been given those instructions -please do the following                           The evening before your surgery after dinner before midnight drink 40 ounces of gatorade. If you are diabetic use sugar free. The morning of surgery drink 40 ounces of water. This needs to be finished 3 hours prior to your surgery start time. 2. Take the following pills with a small sip of water on the morning of surgery: nifedipine and levothyroxine                  Do not take blood pressure medications ending in pril or sartan the han prior to surgery or the morning of surgery. Dr Viky Banuelos patient are not to take any medications the AM of surgery. 3. Aspirin, Ibuprofen, Advil, Naproxen, Vitamin E and other Anti-inflammatory products and supplements should be stopped for 5 -7days before surgery or as directed by your physician. 4. Check with your Doctor regarding stopping Plavix, Coumadin,Eliquis, Lovenox,Effient,Pradaxa,Xarelto, Fragmin or other blood thinners and follow their instructions. 5. Do not smoke, and do not drink any alcoholic beverages 24 hours prior to surgery. This includes NA Beer. Refrain from the usage of any recreational drugs. 6. You may brush your teeth and gargle the morning of surgery. DO NOT SWALLOW WATER   7. You MUST make arrangements for a responsible adult to stay on site while you are here and take you home after your surgery.  You will not be allowed to leave alone or drive yourself home.  It is strongly suggested someone stay with you the first 24 hrs. Your surgery will be cancelled if you do not have a ride home. 8. A parent/legal guardian must accompany a child scheduled for surgery and plan to stay at the hospital until the child is discharged. Please do not bring other children with you. 9. Please wear simple, loose fitting clothing to the hospital.  Orquidea Watt not bring valuables (money, credit cards, checkbooks, etc.) Do not wear any makeup (including no eye makeup) or nail polish on your fingers or toes. 10. DO NOT wear any jewelry or piercings on day of surgery. All body piercing jewelry must be removed. 11. If you have ___dentures, they will be removed before going to the OR; we will provide you a container. If you wear ___contact lenses or __x_glasses, they will be removed; please bring a case for them. 12. Please see your family doctor/pediatrician for a history & physical and/or concerning medications. Bring any test results/reports from your physician's office. PCP__________________Phone___________H&P Appt. Date________             13 If you  have a Living Will and Durable Power of  for Healthcare, please bring in a copy. 15. Notify your Surgeon if you develop any illness between now and surgery  time, cough, cold, fever, sore throat, nausea, vomiting, etc.  Please notify your surgeon if you experience dizziness, shortness of breath or blurred vision between now & the time of your surgery             15. DO NOT shave your operative site 96 hours prior to surgery. For face & neck surgery, men may use an electric razor 48 hours prior to surgery. 16. Shower the night before or morning of surgery using an antibacterial soap or as you have been instructed. 17. To provide excellent care visitors will be limited to one in the room at any given time.              18.  Please bring picture ID and insurance card.             19.  Visit our web site for additional information:  [email or web address]              20.During flu season no children under the age of 15 are permitted in the hospital for the safety of all patients. 21. If you take a long acting insulin in the evening only  take half of your usual  dose the night  before your procedure              22. If you use a c-pap please bring DOS if staying overnight,             23.For your convenience [facility] has a pharmacy on site to fill your prescriptions. 24. If you use oxygen and have a portable tank please bring it  with you the DOS             25. Bring a complete list of all your medications with name and dose include any supplements. 26. Other: Use the Bresepi inhaler and bring albuterol inhaler   *Please call pre admission testing if you any further questions   [place] [address] [phone]    [facility] [place]  [phone]   [address] [place] [address]       VISITOR POLICY(subject to change)    Current policy is 2 visitors per patient. No children. Mask is  at the discretion of the facility. Visiting hours are 8a-8p. Overnight visitors will be at the discretion of the nurse. All policies subject to change. All above information reviewed with patient in person or by phone. Patient verbalizes understanding. All questions and concerns addressed.                                                                                                  Patient/Rep____patient________________                                                                                                                                    PRE OP INSTRUCTIONS

## 2022-12-06 NOTE — DISCHARGE INSTRUCTIONS
[address]  [phone]    POSTOPERATIVE INSTRUCTIONS    - For the first 48 hours after your surgery, rest and elevate the surgical area as much as possible. - Application of an ice pack to the area is helpful. A small amount of drainage or swelling may be expected. - Side effects to anesthesia may include nausea, lightheadedness, coughing, sore throat, and/or muscle aches. Rest, fluids, and light foods can help. Please call our office if:      - Your hand or foot becomes numb, blue, or cold. It is normal to experience numbness  to 24 hrs after surgery if you did receive a nerve block    - You have severe pain or burning which is not relieved with your pain medication.     - Your incision has become reddened or swollen, painful or has excessive bleeding or foul smelling drainage. Call for an appointment to be seen on Monday/Tuesday     You have been given prescriptions for:  Oxycodone    RESUME ELIQUIS TOMORROW AM     OTC tylenol 1000mg every 8 hrs    If your upper extremity was operated on,    A sling is  necessary    You may remove it: for elbow/wrist ROM exercises  after nerve block wears off    DO NOT REMOVE CLEAR WATERPROOF DRESSING    It is ok to shower 1 days after surgery   AVOID DIRECT WATER CONTACT TO SHOULDER    PAT DRESSING DRY AFTER SHOWERING    REMOVE CLEAR DRESSING/GAUZE NEXT THURSDAY    DO NOT REMOVE STERI STRIPS  THEY WILL FALL OFF ON THEIR OWN    DO NOT SUBMERGE SHOULDER UNDER WATER UNTIL  4 WEEKS POST OP    ANESTHESIA DISCHARGE INSTRUCTIONS    Wear your seatbelt home. You are under the influence of drugs-do not drink alcohol,drive,operate machinery,or make any important decisions or sign any legal documentsfor 24 hours  A responsible adult needs to be with you for 24 hours. You may experience lightheadedness,dizziness,or sleepiness following surgery. Rest at home today- increase activity as tolerated.   Progress slowly to a regular diet unless your physician has instructed you otherwise. Drink plenty of water. If nausea becomes a problem call your physician. Coughing,sore throat,and muscle aches are other side effects of anesthesia,and should improve with time. Do not drive,operate machinery while taking narcotics.

## 2022-12-08 ENCOUNTER — ANESTHESIA EVENT (OUTPATIENT)
Dept: OPERATING ROOM | Age: 67
End: 2022-12-08
Payer: COMMERCIAL

## 2022-12-08 ENCOUNTER — HOSPITAL ENCOUNTER (OUTPATIENT)
Age: 67
Setting detail: OUTPATIENT SURGERY
Discharge: HOME | End: 2022-12-08
Attending: ORTHOPAEDIC SURGERY | Admitting: ORTHOPAEDIC SURGERY
Payer: COMMERCIAL

## 2022-12-08 ENCOUNTER — ANESTHESIA (OUTPATIENT)
Dept: OPERATING ROOM | Age: 67
End: 2022-12-08
Payer: COMMERCIAL

## 2022-12-08 VITALS
RESPIRATION RATE: 16 BRPM | BODY MASS INDEX: 27.06 KG/M2 | WEIGHT: 189 LBS | OXYGEN SATURATION: 96 % | DIASTOLIC BLOOD PRESSURE: 70 MMHG | HEART RATE: 92 BPM | SYSTOLIC BLOOD PRESSURE: 148 MMHG | TEMPERATURE: 97 F | HEIGHT: 70 IN

## 2022-12-08 DIAGNOSIS — S46.011D TRAUMATIC COMPLETE TEAR OF RIGHT ROTATOR CUFF, SUBSEQUENT ENCOUNTER: Primary | ICD-10-CM

## 2022-12-08 LAB
GLUCOSE BLD-MCNC: 103 MG/DL (ref 70–99)
INR BLD: 0.94 (ref 0.87–1.14)
PERFORMED ON: ABNORMAL
PROTHROMBIN TIME: 12.5 SEC (ref 11.7–14.5)

## 2022-12-08 PROCEDURE — 3600000004 HC SURGERY LEVEL 4 BASE: Performed by: ORTHOPAEDIC SURGERY

## 2022-12-08 PROCEDURE — 6360000002 HC RX W HCPCS: Performed by: REGISTERED NURSE

## 2022-12-08 PROCEDURE — 2500000003 HC RX 250 WO HCPCS: Performed by: REGISTERED NURSE

## 2022-12-08 PROCEDURE — 7100000011 HC PHASE II RECOVERY - ADDTL 15 MIN: Performed by: ORTHOPAEDIC SURGERY

## 2022-12-08 PROCEDURE — 7100000001 HC PACU RECOVERY - ADDTL 15 MIN: Performed by: ORTHOPAEDIC SURGERY

## 2022-12-08 PROCEDURE — 36415 COLL VENOUS BLD VENIPUNCTURE: CPT

## 2022-12-08 PROCEDURE — 85610 PROTHROMBIN TIME: CPT

## 2022-12-08 PROCEDURE — 2580000003 HC RX 258: Performed by: ORTHOPAEDIC SURGERY

## 2022-12-08 PROCEDURE — 2500000003 HC RX 250 WO HCPCS: Performed by: ANESTHESIOLOGY

## 2022-12-08 PROCEDURE — 3700000000 HC ANESTHESIA ATTENDED CARE: Performed by: ORTHOPAEDIC SURGERY

## 2022-12-08 PROCEDURE — 7100000000 HC PACU RECOVERY - FIRST 15 MIN: Performed by: ORTHOPAEDIC SURGERY

## 2022-12-08 PROCEDURE — 3600000014 HC SURGERY LEVEL 4 ADDTL 15MIN: Performed by: ORTHOPAEDIC SURGERY

## 2022-12-08 PROCEDURE — 2720000010 HC SURG SUPPLY STERILE: Performed by: ORTHOPAEDIC SURGERY

## 2022-12-08 PROCEDURE — 7100000010 HC PHASE II RECOVERY - FIRST 15 MIN: Performed by: ORTHOPAEDIC SURGERY

## 2022-12-08 PROCEDURE — 2709999900 HC NON-CHARGEABLE SUPPLY: Performed by: ORTHOPAEDIC SURGERY

## 2022-12-08 PROCEDURE — 6360000002 HC RX W HCPCS: Performed by: ORTHOPAEDIC SURGERY

## 2022-12-08 PROCEDURE — 2500000003 HC RX 250 WO HCPCS: Performed by: ORTHOPAEDIC SURGERY

## 2022-12-08 PROCEDURE — 6360000002 HC RX W HCPCS: Performed by: ANESTHESIOLOGY

## 2022-12-08 PROCEDURE — C1713 ANCHOR/SCREW BN/BN,TIS/BN: HCPCS | Performed by: ORTHOPAEDIC SURGERY

## 2022-12-08 PROCEDURE — 3700000001 HC ADD 15 MINUTES (ANESTHESIA): Performed by: ORTHOPAEDIC SURGERY

## 2022-12-08 PROCEDURE — 6360000002 HC RX W HCPCS

## 2022-12-08 PROCEDURE — 2580000003 HC RX 258: Performed by: REGISTERED NURSE

## 2022-12-08 PROCEDURE — 64415 NJX AA&/STRD BRCH PLXS IMG: CPT | Performed by: ANESTHESIOLOGY

## 2022-12-08 DEVICE — IMPLANTABLE DEVICE: Type: IMPLANTABLE DEVICE | Site: SHOULDER | Status: FUNCTIONAL

## 2022-12-08 DEVICE — ANCHOR SUTURE BIOCOMP 4.75X19.1 MM SWIVELOCK C: Type: IMPLANTABLE DEVICE | Site: SHOULDER | Status: FUNCTIONAL

## 2022-12-08 RX ORDER — BUPIVACAINE HYDROCHLORIDE 2.5 MG/ML
INJECTION, SOLUTION EPIDURAL; INFILTRATION; INTRACAUDAL
Status: COMPLETED | OUTPATIENT
Start: 2022-12-08 | End: 2022-12-08

## 2022-12-08 RX ORDER — FENTANYL CITRATE 50 UG/ML
25 INJECTION, SOLUTION INTRAMUSCULAR; INTRAVENOUS EVERY 5 MIN PRN
Status: DISCONTINUED | OUTPATIENT
Start: 2022-12-08 | End: 2022-12-08 | Stop reason: HOSPADM

## 2022-12-08 RX ORDER — ROCURONIUM BROMIDE 10 MG/ML
INJECTION, SOLUTION INTRAVENOUS PRN
Status: DISCONTINUED | OUTPATIENT
Start: 2022-12-08 | End: 2022-12-08 | Stop reason: SDUPTHER

## 2022-12-08 RX ORDER — BUPIVACAINE HYDROCHLORIDE 5 MG/ML
INJECTION, SOLUTION EPIDURAL; INTRACAUDAL
Status: COMPLETED | OUTPATIENT
Start: 2022-12-08 | End: 2022-12-08

## 2022-12-08 RX ORDER — LABETALOL HYDROCHLORIDE 5 MG/ML
10 INJECTION, SOLUTION INTRAVENOUS
Status: DISCONTINUED | OUTPATIENT
Start: 2022-12-08 | End: 2022-12-08 | Stop reason: HOSPADM

## 2022-12-08 RX ORDER — LIDOCAINE HYDROCHLORIDE 20 MG/ML
INJECTION, SOLUTION EPIDURAL; INFILTRATION; INTRACAUDAL; PERINEURAL PRN
Status: DISCONTINUED | OUTPATIENT
Start: 2022-12-08 | End: 2022-12-08 | Stop reason: SDUPTHER

## 2022-12-08 RX ORDER — SODIUM CHLORIDE, SODIUM LACTATE, POTASSIUM CHLORIDE, CALCIUM CHLORIDE 600; 310; 30; 20 MG/100ML; MG/100ML; MG/100ML; MG/100ML
INJECTION, SOLUTION INTRAVENOUS CONTINUOUS
Status: DISCONTINUED | OUTPATIENT
Start: 2022-12-08 | End: 2022-12-08 | Stop reason: HOSPADM

## 2022-12-08 RX ORDER — ONDANSETRON 2 MG/ML
INJECTION INTRAMUSCULAR; INTRAVENOUS PRN
Status: DISCONTINUED | OUTPATIENT
Start: 2022-12-08 | End: 2022-12-08 | Stop reason: SDUPTHER

## 2022-12-08 RX ORDER — FENTANYL CITRATE 50 UG/ML
50 INJECTION, SOLUTION INTRAMUSCULAR; INTRAVENOUS ONCE
Status: COMPLETED | OUTPATIENT
Start: 2022-12-08 | End: 2022-12-08

## 2022-12-08 RX ORDER — SUCCINYLCHOLINE/SOD CL,ISO/PF 200MG/10ML
SYRINGE (ML) INTRAVENOUS PRN
Status: DISCONTINUED | OUTPATIENT
Start: 2022-12-08 | End: 2022-12-08 | Stop reason: SDUPTHER

## 2022-12-08 RX ORDER — OXYCODONE HYDROCHLORIDE 5 MG/1
5 TABLET ORAL
Status: DISCONTINUED | OUTPATIENT
Start: 2022-12-08 | End: 2022-12-08 | Stop reason: HOSPADM

## 2022-12-08 RX ORDER — SODIUM CHLORIDE 9 MG/ML
INJECTION, SOLUTION INTRAVENOUS CONTINUOUS
Status: DISCONTINUED | OUTPATIENT
Start: 2022-12-08 | End: 2022-12-08 | Stop reason: HOSPADM

## 2022-12-08 RX ORDER — ALBUTEROL SULFATE 2.5 MG/3ML
SOLUTION RESPIRATORY (INHALATION)
Status: COMPLETED
Start: 2022-12-08 | End: 2022-12-08

## 2022-12-08 RX ORDER — ONDANSETRON 2 MG/ML
4 INJECTION INTRAMUSCULAR; INTRAVENOUS
Status: DISCONTINUED | OUTPATIENT
Start: 2022-12-08 | End: 2022-12-08 | Stop reason: HOSPADM

## 2022-12-08 RX ORDER — OXYCODONE HYDROCHLORIDE 5 MG/1
5 TABLET ORAL EVERY 6 HOURS PRN
Qty: 28 TABLET | Refills: 0 | Status: SHIPPED | OUTPATIENT
Start: 2022-12-08 | End: 2022-12-15

## 2022-12-08 RX ORDER — LIDOCAINE HYDROCHLORIDE 10 MG/ML
0.5 INJECTION, SOLUTION EPIDURAL; INFILTRATION; INTRACAUDAL; PERINEURAL ONCE
Status: DISCONTINUED | OUTPATIENT
Start: 2022-12-08 | End: 2022-12-08 | Stop reason: HOSPADM

## 2022-12-08 RX ORDER — DEXAMETHASONE SODIUM PHOSPHATE 4 MG/ML
INJECTION, SOLUTION INTRA-ARTICULAR; INTRALESIONAL; INTRAMUSCULAR; INTRAVENOUS; SOFT TISSUE PRN
Status: DISCONTINUED | OUTPATIENT
Start: 2022-12-08 | End: 2022-12-08 | Stop reason: SDUPTHER

## 2022-12-08 RX ORDER — HYDRALAZINE HYDROCHLORIDE 20 MG/ML
10 INJECTION INTRAMUSCULAR; INTRAVENOUS
Status: DISCONTINUED | OUTPATIENT
Start: 2022-12-08 | End: 2022-12-08 | Stop reason: HOSPADM

## 2022-12-08 RX ORDER — EPHEDRINE SULFATE/0.9% NACL/PF 50 MG/5 ML
SYRINGE (ML) INTRAVENOUS PRN
Status: DISCONTINUED | OUTPATIENT
Start: 2022-12-08 | End: 2022-12-08 | Stop reason: SDUPTHER

## 2022-12-08 RX ORDER — LIDOCAINE HYDROCHLORIDE 10 MG/ML
1 INJECTION, SOLUTION EPIDURAL; INFILTRATION; INTRACAUDAL; PERINEURAL
Status: CANCELLED | OUTPATIENT
Start: 2022-12-08 | End: 2022-12-09

## 2022-12-08 RX ORDER — FENTANYL CITRATE 50 UG/ML
INJECTION, SOLUTION INTRAMUSCULAR; INTRAVENOUS PRN
Status: DISCONTINUED | OUTPATIENT
Start: 2022-12-08 | End: 2022-12-08 | Stop reason: SDUPTHER

## 2022-12-08 RX ORDER — HYDROMORPHONE HCL 110MG/55ML
0.5 PATIENT CONTROLLED ANALGESIA SYRINGE INTRAVENOUS EVERY 5 MIN PRN
Status: DISCONTINUED | OUTPATIENT
Start: 2022-12-08 | End: 2022-12-08 | Stop reason: HOSPADM

## 2022-12-08 RX ORDER — ACETAMINOPHEN 500 MG
1000 TABLET ORAL 3 TIMES DAILY
COMMUNITY
Start: 2022-12-08

## 2022-12-08 RX ORDER — PROCHLORPERAZINE EDISYLATE 5 MG/ML
5 INJECTION INTRAMUSCULAR; INTRAVENOUS
Status: DISCONTINUED | OUTPATIENT
Start: 2022-12-08 | End: 2022-12-08 | Stop reason: HOSPADM

## 2022-12-08 RX ORDER — MAGNESIUM HYDROXIDE 1200 MG/15ML
LIQUID ORAL CONTINUOUS PRN
Status: COMPLETED | OUTPATIENT
Start: 2022-12-08 | End: 2022-12-08

## 2022-12-08 RX ORDER — PROPOFOL 10 MG/ML
INJECTION, EMULSION INTRAVENOUS PRN
Status: DISCONTINUED | OUTPATIENT
Start: 2022-12-08 | End: 2022-12-08 | Stop reason: SDUPTHER

## 2022-12-08 RX ADMIN — SODIUM CHLORIDE, POTASSIUM CHLORIDE, SODIUM LACTATE AND CALCIUM CHLORIDE: 600; 310; 30; 20 INJECTION, SOLUTION INTRAVENOUS at 13:09

## 2022-12-08 RX ADMIN — PHENYLEPHRINE HYDROCHLORIDE 200 MCG: 10 INJECTION INTRAVENOUS at 13:47

## 2022-12-08 RX ADMIN — FENTANYL CITRATE 50 MCG: 50 INJECTION, SOLUTION INTRAMUSCULAR; INTRAVENOUS at 13:59

## 2022-12-08 RX ADMIN — PHENYLEPHRINE HYDROCHLORIDE 100 MCG: 10 INJECTION INTRAVENOUS at 13:42

## 2022-12-08 RX ADMIN — ALBUTEROL SULFATE: 2.5 SOLUTION RESPIRATORY (INHALATION) at 12:50

## 2022-12-08 RX ADMIN — PROPOFOL 160 MG: 10 INJECTION, EMULSION INTRAVENOUS at 13:16

## 2022-12-08 RX ADMIN — PHENYLEPHRINE HYDROCHLORIDE 20 MCG/MIN: 10 INJECTION INTRAVENOUS at 13:42

## 2022-12-08 RX ADMIN — Medication 10 MG: at 14:04

## 2022-12-08 RX ADMIN — Medication 100 MG: at 13:17

## 2022-12-08 RX ADMIN — SODIUM CHLORIDE: 9 INJECTION, SOLUTION INTRAVENOUS at 12:42

## 2022-12-08 RX ADMIN — DEXAMETHASONE SODIUM PHOSPHATE 8 MG: 4 INJECTION, SOLUTION INTRAMUSCULAR; INTRAVENOUS at 13:22

## 2022-12-08 RX ADMIN — LIDOCAINE HYDROCHLORIDE 90 MG: 20 INJECTION, SOLUTION EPIDURAL; INFILTRATION; INTRACAUDAL; PERINEURAL at 13:16

## 2022-12-08 RX ADMIN — Medication 10 MG: at 14:36

## 2022-12-08 RX ADMIN — BUPIVACAINE HYDROCHLORIDE 20 ML: 5 INJECTION, SOLUTION EPIDURAL; INTRACAUDAL at 13:01

## 2022-12-08 RX ADMIN — FENTANYL CITRATE 25 MCG: 50 INJECTION, SOLUTION INTRAMUSCULAR; INTRAVENOUS at 14:26

## 2022-12-08 RX ADMIN — ONDANSETRON 4 MG: 2 INJECTION INTRAMUSCULAR; INTRAVENOUS at 14:33

## 2022-12-08 RX ADMIN — ROCURONIUM BROMIDE 5 MG: 10 INJECTION, SOLUTION INTRAVENOUS at 13:16

## 2022-12-08 RX ADMIN — Medication 10 MG: at 14:40

## 2022-12-08 RX ADMIN — Medication 5 MG: at 14:48

## 2022-12-08 RX ADMIN — CEFAZOLIN 2000 MG: 2 INJECTION, POWDER, FOR SOLUTION INTRAMUSCULAR; INTRAVENOUS at 13:23

## 2022-12-08 RX ADMIN — Medication 5 MG: at 13:45

## 2022-12-08 RX ADMIN — FENTANYL CITRATE 25 MCG: 50 INJECTION, SOLUTION INTRAMUSCULAR; INTRAVENOUS at 13:16

## 2022-12-08 RX ADMIN — Medication 10 MG: at 13:47

## 2022-12-08 RX ADMIN — FENTANYL CITRATE 25 MCG: 50 INJECTION INTRAMUSCULAR; INTRAVENOUS at 12:56

## 2022-12-08 ASSESSMENT — PAIN - FUNCTIONAL ASSESSMENT: PAIN_FUNCTIONAL_ASSESSMENT: NONE - DENIES PAIN

## 2022-12-08 ASSESSMENT — ENCOUNTER SYMPTOMS: SHORTNESS OF BREATH: 0

## 2022-12-08 ASSESSMENT — PAIN SCALES - GENERAL
PAINLEVEL_OUTOF10: 0
PAINLEVEL_OUTOF10: 5
PAINLEVEL_OUTOF10: 6

## 2022-12-08 NOTE — H&P
Date of Surgery Update:      Starr Andrade  was seen, history and physical examination reviewed, and patient examined by me today. There have been no significant clinical changes since the completion of the previous history and physical.    The patient and I discussed that this is an elective procedure/surgery. We discussed the risks of the procedure/surgery, including but not limited to what is outlined in the signed informed consent. We also discussed the risk of jaspreet COVID 19 while in the facility. We discussed the increased risk of a bad outcome should the patient contract COVID 19 during the post-procedural/post-operative period, given the patients current health condition, chronic conditions, and the added risk of COVID 19 in light of these conditions. The patient and I also discussed the risk of further postponing the procedure/surgery and other treatment alternatives, including non-procedural/surgical treatments. The risk, benefits, and alternatives of the proposed procedure have been explained to the patient (or appropriate guardian) and understanding verbalized. All questions answered. Patient wishes to proceed.         Electronically signed by: Devang Lr MD,12/8/2022,11:59 AM

## 2022-12-08 NOTE — OP NOTE
Operative Note      Patient: Yolanda Mcmillan  YOB: 1955  MRN: [id number]    Date of Procedure: 12/8/2022    Pre-Op Diagnosis: Impingement syndrome of right shoulder [M75.41]  Superior glenoid labrum lesion of right shoulder, initial encounter [S43.431A]  Sprain of right rotator cuff capsule, initial encounter [L76.670R]    Post-Op Diagnosis: Same       Procedure(s):  RIGHT SHOULDER ARTHROSCOPIC SUBACROMIAL DECOMPRESSION ANA LIMITED DEBRIDEMENT ROTATOR CUFF REPAIR-INTERSCALENE BLOCK-ARTHREX Open biceps tenodesis    Surgeon(s):  Yuliya Kim MD    Assistant:   Surgical Assistant: Vanessa Peralta    Anesthesia: General    Estimated Blood Loss (mL): less than 50     Complications: None    Specimens:   * No specimens in log *    Implants:  Implant Name Type Inv. Item Serial No.  Lot No. LRB No. Used Action   ANCHOR SUTURE BIOCOMP 4.75X19.1 MM Ashok James [id number]  ANCHOR SUTURE BIOCOMP 4.75X19.1 MM [facility] [id number] Right 1 Implanted   [id number] IMPLANT DELIVERY SYSTEM BISTAL BICEPTS REPAIR      Right 1 Implanted         Drains: * No LDAs found *    Findings: RTC tear, AC joint OA, impingement type 3 acromion, SLAP tear    Detailed Description of Procedure:   Operative Report    Patient: Yolanda Mcmillan  YOB: 1955  Age: 74 y.o. Sex: male  MRN: [id number]    DATE OF OPERATION : 12/8/2022      PREOPERATIVE DIAGNOSES:  1. Right  rotator cuff tear. 2. Right  shoulder impingement syndrome. 3. AC joint OA  4. Superior labral tear      POSTOPERATIVE DIAGNOSES:   Same    PROCEDURES PERFORMED:  1. Right  shoulder arthroscopy with arthroscopic rotator cuff repair. 2. Right  shoulder arthroscopy with subacromial decompression. 3. Right  shoulder distal clavicle resection  4. Right shoulder open biceps tenodesis    COMPLICATIONS: None. CONSULTATIONS: None.      ANESTHESIA: Preoperative interscalene block and a general anesthetic. SURGEON:  Devang Lr MD    ASSISTANT: PRABHJOT Roland is a board certified physician assistant who is medically necessary as a first assistant in the operating room with me. He is responsible for assisting with positioning of the patient,retraction,cauterization ,manipulation of the involved extremity and triangulaton of the arthroscopic equipment to allow for optimal visualization of suture and anchor placement. His presence in the operating room with me as a first assistant during shoulder procedures enables me to perform the surgical procedure in a more timely and efficient manner. The [facility] does not provide me with a first assistant in the operating room who has the same surgical skills as my physician assistant. INDICATIONS FOR SURGERY: Starr Andrade, is a 74 y.o. male patient who complains of worsening Right shoulder pain and weakness. The patient's  clinical examination was consistent with a tear of the rotator cuff. MRI  confirmed the diagnosis. The patient failed to improve with conservative  treatment, so the risks and benefits of Right shoulder arthroscopy with arthroscopic rotator cuff repair and subacromial decompression and indicated procedures were carefully  explained in great detail and the patient elected to proceed to surgery. OPERATIVE FINDINGS: The glenohumeral joint was smooth with intact articular  surfaces and a Type 4 SLAP tear. The undersurface of the rotator cuff  demonstrated a small full thickness tear. The subacromial space was  evaluated and was consistent with marked bursitis and a large anterolateral  acromial spur. The [facility] joint had end stage OA. There was a  1.5 cm in diameter tear of the supraspinatus tendon  at its insertion. DETAILS OF SURGERY: The patient had an interscalene block and was brought to  the operating room and placed supine on the operating room table.  The patient was  induced under general endotracheal anesthesia. The patient was then placed in the  beach chair position on the [employer] table. Great care was taken to stabilize  the head and neck. The Right shoulder was prepped and draped in the usual sterile fashion. The standard posterior arthroscopic portal was obtained. Arthroscopic findings were as stated above. The anterior portal was  established 1 cm proximal and lateral to the coracoid process. Through this  portal, the contents of the glenohumeral joint were thoroughly palpated. The large SLAP tear and biceps tendon tear were debrided. The  scope was removed and placed in the subacromial space. A lateral portal was  established in the anterolateral corner of the acromion. Through this  portal, the Mitek heat probe was used to debride the undersurface of the  acromion. The 5.5 mm acromionizer was used to perform a subacromial  decompression, removing a large anterolateral acromial spur in line with the  posterior slope. 1 cm of distal clavicle was resection. The lateral edge of the humerus was debrided. The rotator  cuff edges were grasped with a FiberTape. It was then tacked down with a  Arthrex SwiveLock. Tremendous compression was noted with restoration of the  rotator cuff to the footprint. The shoulder was brought through full range of  motion and the repair was stable. Attention was turned to the subpectoral region where a 3 cm incision was made. Blunt disection was performed demonstrating the LHBT.  2 jennifer retractors were placed. The Arthrex biceps tenodesis kit was utilized. The tendon was securely tenodesed into a 7.5 mm hole with a tight rope and tenodesis screw. The wound was irrigated with copious  amounts of normal saline. The skin was reapproximated with 3-0 subcutaneous  Vicryl sutures and 4-0 subcuticular stitch followed by mastisol and Steri-Strips. A  dry sterile dressing was applied and sling/swathe with abduction pillow.  The patient was awakened and taken in stable condition to the recovery room for postoperative care and pain management. The patient is to be discharged home with pain medication, and instructions to followup in the office for recheck and continued therapy in 2 days time.        ELECTRONICALLY SIGNED BY: Giovanna Resendiz MD, 12/8/2022     Electronically signed by Giovanna Resendiz MD on 12/8/2022 at 2:36 PM

## 2022-12-08 NOTE — ANESTHESIA PROCEDURE NOTES
Peripheral Block    Patient location during procedure: pre-op  Reason for block: post-op pain management and at surgeon's request  Start time: 12/8/2022 1:01 PM  End time: 12/8/2022 1:03 PM  Staffing  Performed: resident/CRNA   Anesthesiologist: Pavel Medina MD  Resident/CRNA: CHAYITO Faust CRNA  Preanesthetic Checklist  Completed: patient identified, IV checked, site marked, risks and benefits discussed, surgical/procedural consents, equipment checked, pre-op evaluation, timeout performed, anesthesia consent given, oxygen available and monitors applied/VS acknowledged  Peripheral Block   Patient position: sitting  Prep: ChloraPrep  Provider prep: mask and sterile gloves  Patient monitoring: cardiac monitor, continuous pulse ox, frequent blood pressure checks and IV access  Block type: Brachial plexus  Interscalene  Laterality: right  Injection technique: single-shot  Guidance: nerve stimulator and ultrasound guided  Local infiltration: lidocaine  Infiltration strength: 1 %  Local infiltration: lidocaine  Dose: 3 mL    Needle   Needle type: short-bevel   Needle gauge: 22 G  Needle localization: ultrasound guidance and nerve stimulator  Needle length: 10 cm  Assessment   Injection assessment: negative aspiration for heme, no paresthesia on injection and local visualized surrounding nerve on ultrasound  Paresthesia pain: none  Slow fractionated injection: yes  Hemodynamics: stable  Real-time US image taken/store: yes  Outcomes: uncomplicated and patient tolerated procedure well    Additional Notes  U/S [id number]. (1) Under ultrasound guidance, a 22 gauge needle was inserted and placed in close proximity to the BP nerve. (2) Ultrasound was also used to visualize the spread of the anesthetic in close proximity to the nerve being blocked.  (3) The nerve appeared anatomically normal, and (4 there were no apparent abnormal pathological findings on the image that were readily visible and related to the nerve being blocked. (5) A permanent ultrasound image was saved in the patient's record.             Medications Administered  bupivacaine (PF) 0.5 % - Perineural   20 mL - 12/8/2022 1:01:00 PM

## 2022-12-08 NOTE — ANESTHESIA POSTPROCEDURE EVALUATION
Department of Anesthesiology  Postprocedure Note    Patient: Lawrence Villela  MRN: [id number]  YOB: 1955  Date of evaluation: 12/8/2022      Procedure Summary     Date: 12/08/22 Room / Location: [address]    Anesthesia Start: 1309 Anesthesia Stop: 7151    Procedure: RIGHT SHOULDER ARTHROSCOPIC SUBACROMIAL DECOMPRESSION ANA LIMITED DEBRIDEMENT ROTATOR CUFF REPAIR, BICEPS TENODESIS-INTERSCALENE BLOCK-ARTHREX (Right: Shoulder) Diagnosis:       Impingement syndrome of right shoulder      Superior glenoid labrum lesion of right shoulder, initial encounter      Sprain of right rotator cuff capsule, initial encounter      (Impingement syndrome of right shoulder [M75.41])      (Superior glenoid labrum lesion of right shoulder, initial encounter [K48.032J])      (Sprain of right rotator cuff capsule, initial encounter [G43.381E])    Surgeons: Sheldon Lynn MD Responsible Provider: Hue Fisher MD    Anesthesia Type: general, regional ASA Status: 3          Anesthesia Type: No value filed.     Carolyn Phase I: Carolyn Score: 7    Carolyn Phase II: Carolyn Score: 10      Anesthesia Post Evaluation    Patient location during evaluation: PACU  Patient participation: complete - patient participated  Level of consciousness: awake and alert  Airway patency: patent  Nausea & Vomiting: no nausea and no vomiting  Complications: no  Cardiovascular status: hemodynamically stable  Respiratory status: acceptable  Hydration status: stable  Multimodal analgesia pain management approach

## 2022-12-08 NOTE — PROGRESS NOTES
Awake, VSS tolerating po, vascular checks wnl, rates pain 5/10, denies pain medication, wife at bedside

## 2022-12-08 NOTE — ANESTHESIA PRE PROCEDURE
Department of Anesthesiology  Preprocedure Note       Name:  Jenny Aviles   Age:  74 y.o.  :  1955                                          MRN:  [id number]         Date:  2022      Surgeon: Earlene Johnson):  Wilton Dixon MD    Procedure: Procedure(s):  RIGHT SHOULDER ARTHROSCOPIC SUBACROMIAL DECOMPRESSION [place] LIMITED DEBRIDEMENT ROTATOR CUFF REPAIR-INTERSCALENE BLOCK-ARTHREX    Medications prior to admission:   Prior to Admission medications    Medication Sig Start Date End Date Taking? Authorizing Provider   oxyCODONE (ROXICODONE) 5 MG immediate release tablet Take 1 tablet by mouth every 6 hours as needed for Pain for up to 7 days. Intended supply: 7 days. Take lowest dose possible to manage pain 12/8/22 12/15/22 Yes Celena Camacho   acetaminophen (TYLENOL) 500 MG tablet Take 2 tablets by mouth 3 times daily 22  Yes Celena Camacho   lovastatin (MEVACOR) 10 MG tablet TAKE 1 TABLET NIGHTLY 22   Joo Tuttle MD   glycopyrrolate-formoterol (BEVESPI AEROSPHERE) 9-4.8 MCG/ACT AERO Inhale 2 puffs into the lungs 2 times daily 22   Viridiana Forrester MD   apixaban (ELIQUIS) 5 MG TABS tablet Take 1 tablet by mouth 2 times daily Take 5 mg by mouth 2 times daily 22   Joo Tuttle MD   losartan (COZAAR) 100 MG tablet Take 100 mg by mouth at bedtime 22   Joo Tuttle MD   colchicine (COLCRYS) 0.6 MG tablet Take 2 tabs first day and then another tablet few hours later then 1 tab daily until symptoms resolved. 22   Arnel Woodruff MD   fluticasone (CUTIVATE) 0.05 % cream Apply topically 2 times daily as needed.  10/17/22   Joo Tuttle MD   NIFEdipine (PROCARDIA XL) 60 MG extended release tablet Take by mouth daily 22   Historical MD [place]   levothyroxine (SYNTHROID) 50 MCG tablet TAKE 1 TABLET DAILY 7/15/22   Joo Tuttle MD   Coenzyme Q10 (CO Q 10 PO) Take by mouth    Historical Provider, MD   albuterol sulfate HFA (PROAIR HFA) 108 (90 Base) MCG/ACT inhaler Inhale 2 puffs into the lungs every 6 hours as needed for Wheezing 8/24/21   Jesus Jenkins MD   azelastine (ASTELIN) 0.1 % nasal spray 1 spray by Nasal route daily Use in each nostril as directed 6/14/21   Chris Sands MD   fluticasone [facility]) 50 MCG/ACT nasal spray 2 sprays by Nasal route daily 12/29/20   Historical Provider, MD       Current medications:    Current Facility-Administered Medications   Medication Dose Route Frequency Provider Last Rate Last Admin    lidocaine PF 1 % injection 0.5 mL  0.5 mL IntraDERmal Once Jaci Bloch, MD        lactated ringers infusion   IntraVENous Continuous Jaci Bloch, MD        ceFAZolin (ANCEF) 2,000 mg in dextrose 5 % 50 mL IVPB (mini-bag)  2,000 mg IntraVENous On Call to Penelope Maravilla MD        0.9 % sodium chloride infusion   IntraVENous Continuous Jaci Bloch, MD           Allergies:     Allergies   Allergen Reactions    Diovan [Valsartan] Itching    Ezetimibe-Simvastatin      Can't remember reaction    Ezetimibe-Simvastatin     Clarithromycin Rash     itching  itching  itching  itching       Problem List:    Patient Active Problem List   Diagnosis Code    Anxiety state F41.1    Essential hypertension I10    Generalized osteoarthrosis, involving multiple sites M15.9    Mixed hyperlipidemia E78.2    Rosacea L71.9    COPD (chronic obstructive pulmonary disease) ([facility]) J44.9    Hypothyroid E03.9    Chest pain R07.9    Urinary hesitancy due to benign prostatic hyperplasia N40.1, R39.11    Primary osteoarthritis of right knee M17.11    Chronic renal failure, stage 3 (moderate) ([facility]) N18.30    Pinguecula H11.159    Paroxysmal atrial fibrillation ([facility]) I48.0    PVD (peripheral vascular disease) ([facility]) I73.9    H/O thrombosis Z86.718       Past Medical History:        Diagnosis Date    A-fib ([facility])     Anxiety state, unspecified     Asthma     Back problem     CAD (coronary artery disease)     Chronic airway obstruction, not elsewhere classified     Chronic kidney disease     Generalized osteoarthrosis, involving multiple sites     Gout     History of blood transfusion     Hx of blood clots     Insomnia, unspecified     Other and unspecified hyperlipidemia     Rosacea     Thyroid disease     Unspecified essential hypertension        Past Surgical History:        Procedure Laterality Date    ABDOMINAL EXPLORATION SURGERY  2020    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    ARM SURGERY Right     Forearm fracture surgery    BALLOON ANGIOPLASTY, ARTERY Left     left popliteal    CLAVICLE EXCISION Left        Social History:    Social History     Tobacco Use    Smoking status: Former     Packs/day: 2.50     Years: 33.00     Pack years: 82.50     Types: Cigarettes     Quit date: 2006     Years since quittin.0    Smokeless tobacco: Never    Tobacco comments:     smoked for 33 yrs / smoked up to 2.5   Substance Use Topics    Alcohol use: Not Currently     Comment: occasional alcohol use                                 Counseling given: Not Answered  Tobacco comments: smoked for 33 yrs / smoked up to 2.5      Vital Signs (Current):   Vitals:    22 1312   Weight: 192 lb (87.1 kg)   Height: 5' 10\" (1.778 m)                                              BP Readings from Last 3 Encounters:   22 138/76   22 132/64   22 130/60       NPO Status: Time of last liquid consumption:                         Time of last solid consumption:                         Date of last liquid consumption: 22                        Date of last solid food consumption: 22    BMI:   Wt Readings from Last 3 Encounters:   22 192 lb (87.1 kg)   22 201 lb 9.6 oz (91.4 kg)   22 197 lb 6 oz (89.5 kg)     Body mass index is 27.55 kg/m².    CBC:   Lab Results   Component Value Date/Time    WBC 8.2 11/30/2022 07:37 AM    RBC 5.12 11/30/2022 07:37 AM    HGB 15.2 11/30/2022 07:37 AM    HCT 46.8 11/30/2022 07:37 AM    MCV 91.3 11/30/2022 07:37 AM    RDW 15.2 11/30/2022 07:37 AM     11/30/2022 07:37 AM       CMP:   Lab Results   Component Value Date/Time     11/01/2022 08:09 AM    K 4.4 11/01/2022 08:09 AM     11/01/2022 08:09 AM    CO2 24 11/01/2022 08:09 AM    BUN 14 11/01/2022 08:09 AM    CREATININE 1.1 11/01/2022 08:09 AM    GFRAA >60 07/13/2022 07:54 AM    AGRATIO 2.0 07/13/2022 07:54 AM    LABGLOM >60 11/01/2022 08:09 AM    GLUCOSE 109 11/01/2022 08:09 AM    PROT 7.0 07/13/2022 07:54 AM    CALCIUM 9.6 11/01/2022 08:09 AM    BILITOT 0.8 07/13/2022 07:54 AM    ALKPHOS 63 07/13/2022 07:54 AM    AST 26 07/13/2022 07:54 AM    ALT 18 07/13/2022 07:54 AM       POC Tests: No results for input(s): POCGLU, POCNA, POCK, POCCL, POCBUN, POCHEMO, POCHCT in the last 72 hours.     Coags: No results found for: PROTIME, INR, APTT    HCG (If Applicable): No results found for: PREGTESTUR, PREGSERUM, HCG, HCGQUANT     ABGs: No results found for: PHART, PO2ART, TIO7MNY, ADN0YHB, BEART, K5URAPDX     Type & Screen (If Applicable):  No results found for: LABABO, LABRH    Drug/Infectious Status (If Applicable):  No results found for: HIV, HEPCAB    COVID-19 Screening (If Applicable):   Lab Results   Component Value Date/Time    COVID19 Detected 11/29/2021 01:43 PM           Anesthesia Evaluation  Patient summary reviewed and Nursing notes reviewed no history of anesthetic complications:   Airway: Mallampati: I  TM distance: >3 FB   Neck ROM: full  Mouth opening: > = 3 FB   Dental:    (+) edentulous      Pulmonary:   (+) COPD:  asthma:     (-) shortness of breath                           Cardiovascular:    (+) hypertension:, CAD:, dysrhythmias: atrial fibrillation,     (-)  angina          Echocardiogram reviewed                  Neuro/Psych: (-) CVA           GI/Hepatic/Renal:        (-) GERD and liver disease       Endo/Other:    (+) hypothyroidism::., .    (-) diabetes mellitus               Abdominal:             Vascular:   + DVT, .  - PVD. Other Findings:           Anesthesia Plan      general and regional     ASA 3     (Pt consented for interscalene nerve block for post-operative pain control. Discussed risks/benefits of procedure, including bleeding/infection, nerve injury, LAST. Pt understood and expressed understanding to continue.)  Induction: intravenous. MIPS: Postoperative opioids intended and Prophylactic antiemetics administered. Anesthetic plan and risks discussed with patient. Use of blood products discussed with patient whom. Plan discussed with CRNA.                     Stefanie Burch MD   12/8/2022

## 2022-12-27 ENCOUNTER — HOSPITAL ENCOUNTER (OUTPATIENT)
Dept: PULMONOLOGY | Age: 67
Discharge: HOME | End: 2022-12-27
Payer: COMMERCIAL

## 2022-12-27 VITALS — OXYGEN SATURATION: 98 % | RESPIRATION RATE: 16 BRPM | HEART RATE: 76 BPM

## 2022-12-27 LAB
DLCO %PRED: 101 %
DLCO PRED: NORMAL
DLCO/VA %PRED: NORMAL
DLCO/VA PRED: NORMAL
DLCO/VA: NORMAL
DLCO: NORMAL
EXPIRATORY TIME: NORMAL
FEF 25-75% %PRED-PRE: NORMAL
FEF 25-75% PRED: NORMAL
FEF 25-75%-PRE: NORMAL
FEV1 %PRED-PRE: 56 %
FEV1 PRED: NORMAL
FEV1/FVC %PRED-PRE: NORMAL
FEV1/FVC PRED: 83 %
FEV1/FVC: NORMAL
FEV1: NORMAL
FVC %PRED-PRE: NORMAL
FVC PRED: NORMAL
FVC: NORMAL
GAW %PRED: NORMAL
GAW PRED: NORMAL
GAW: NORMAL
IC %PRED: NORMAL
IC PRED: NORMAL
IC: NORMAL
MVV %PRED-PRE: NORMAL
MVV PRED: NORMAL
MVV-PRE: NORMAL
PEF %PRED-PRE: NORMAL
PEF PRED: NORMAL
PEF-PRE: NORMAL
RAW %PRED: NORMAL
RAW PRED: NORMAL
RAW: NORMAL
RV %PRED: NORMAL
RV PRED: NORMAL
RV: NORMAL
SVC %PRED: NORMAL
SVC PRED: NORMAL
SVC: NORMAL
TLC %PRED: 98 %
TLC PRED: NORMAL
TLC: NORMAL
VA %PRED: NORMAL
VA PRED: NORMAL
VA: NORMAL
VTG %PRED: NORMAL
VTG PRED: NORMAL
VTG: NORMAL

## 2022-12-27 PROCEDURE — 94729 DIFFUSING CAPACITY: CPT

## 2022-12-27 PROCEDURE — 94726 PLETHYSMOGRAPHY LUNG VOLUMES: CPT

## 2022-12-27 PROCEDURE — 94760 N-INVAS EAR/PLS OXIMETRY 1: CPT

## 2022-12-27 PROCEDURE — 6370000000 HC RX 637 (ALT 250 FOR IP): Performed by: INTERNAL MEDICINE

## 2022-12-27 PROCEDURE — 94060 EVALUATION OF WHEEZING: CPT

## 2022-12-27 RX ORDER — ALBUTEROL SULFATE 90 UG/1
4 AEROSOL, METERED RESPIRATORY (INHALATION) ONCE
Status: COMPLETED | OUTPATIENT
Start: 2022-12-27 | End: 2022-12-27

## 2022-12-27 RX ADMIN — Medication 4 PUFF: at 08:57

## 2022-12-27 ASSESSMENT — PULMONARY FUNCTION TESTS
FEV1_PERCENT_PREDICTED_PRE: 56
FEV1/FVC_PREDICTED: 83

## 2022-12-29 NOTE — PROCEDURES
Pulmonary Function Testing      Patient name:  Crissy Fregoso     [address] [place] [address] Unit #:   [id number]   Date of test:  12/27/2022  Date of interpretation:   12/29/2022    Mr. Crissy Fregoso is a 74y.o. year-old former smoker. The spirometry data were acceptable and reproducible. Spirometry:  Flow volume loops were obstructed. The FEV-1/FVC ratio was decreased. The FEV-1 was 1.94 liters (56% of predicted), which was moderately decreased. The FVC was 3.15 liters (68% of predicted), which was decreased. Response to inhaled bronchodilators (albuterol) was significant. Lung volumes:  Lung volumes were tested by plethysmography. The total lung capacity was 6.48 liters (98% of predicted), which was normal. The residual volume was 2.9 liters (118% of predicted), which was increased. The ratio of residual volume to total lung capacity (RV/TLC) was 45, which was increased. Diffusion capacity was found to be 101% which is Normal.      Interpretation:  Moderate obstructive airway disease with significant bronchodilator reversibility.      Comments:

## 2023-01-01 NOTE — PROGRESS NOTES
Congratulations on your baby!!!    General Information:  Discharge Date: 2023  Feeding method: Breast  Last time baby ate:    Last wet diaper: 1 (23 0250)  Last stool: 1 (23 07)    Birth weight: 6 lb 13.7 oz (3110 g)    Discharge weight: 6 lb 7.7 oz (2940 g)  Weight change since birth: -5%  Birth length: Length: 49.5 cm     Head circumference measurement: Head Circumference: 33.2 cm (13.07\")      Screenings:  Infant blood type:   Bilirubin:   Bilirubin, Total (mg/dL)   Date Value   2023 8.0 (H)       Screen: Done   Congenital heart disease (CHD) screening: Done (03/10/23 1800)   Right hand reading %: 97 %   Foot reading %: 97 %   CHD: Normal  Hearing test: [facility] Hearing Test Machine: Auditory Brainstem Response (Algo) (03/10/23 1500)   Hearing Test Results: Pass R, Pass L;Final result (03/10/23 1500)  Car Seat Challenge results:      Immunizations:   Most Recent Immunizations   Administered Date(s) Administered    Hep B, adolescent or pediatric 2023        Procedures: Circumcision    Safe Sleep:   Reduce your baby’s risk of SIDS (Sudden Infant Death Syndrome) by practicing Safe Sleep during naps and at night.  Always place your baby on his/her back in a safety approved crib, bassinet, or portable play area. DO NOT use a carseat, adult bed, swing, carrier, or similar products for everyday sleep.    Place your baby on a firm surface, covered with a fitted sheet, and NEVER on a couch, pillow, quilt, or with fluffy materials.  No pillows, stuffed animals, toys, or crib bumpers.     Carseat Safety:   It is recommended that children under the age of two should always be in a rear-facing seat that is installed in the back seat.   Proper fit: Harness straps should lie flat and be snug when clipped.  The chest clip should be at armpit level. Adjust as your baby grows    Feeding your baby:  Your baby should be fed on cue (wakes up, sucking on hands, turning head with mouth open,  Instructed on Lexiscan Stress Test Procedure including possible side effects/ adverse reactions. Patient verbalizes  understanding and denies having any questions . See [facility]                Aminophylline given per lexiscan protocol in stress lab for c/o persistant nausea. and/or then cries).  Your baby will eat every 2-3 hours day and night, or  8-12 times in 24 hours.  Your baby will let you know he/she is full when they detach off nipple (mother’s or a bottle), suck less vigorously, or becomes sleepy and relaxed.   Keep a record of your baby’s feedings and diapers just like you did in the hospital until the baby is seen by the pediatrician.    For additional breastfeeding support, contact [facility] at [phone].  Tummy Time:  Allow your alert and awake baby to have 30-60 minutes of supervised tummy time each day.    Safety at Home:   Accidental infant falls can happen; the key to avoid a fall is prevention.    Remember as you are recovering the medications you may be taking may make you more tired.  Keep this in mind when holding your baby.  If you are feeling sleepy or plan on sleeping place your baby in a safe place such as their crib or bassinet.     Babies wiggle, move, and push against things with their feet.  Even these early movements can result in a fall.  Do not leave your baby alone on a changing table, bed, sofa, or chair.  If you cannot hold your baby put them in a safe place such as their crib or playpen.    If you have other children please be cautious and assist your older children while they are holding baby.  If your child has a serious fall or does not act normally after a fall, call your doctor.      Call your Pediatrician:  Fever 100 degrees F or above  Forceful vomiting (not spitting up)  Several feedings when infant does not suck  Watery, runny stools (mucous, blood or foul odor)  Infant injury (fall from bed or table, dropped or severely shaken)  Constant crying  Any unusual rash  Yellow color of the eyes or skin  Has less than 4 wet diapers in a 24 hr period in the first week of life, and less than 6 wet diapers in a 24 hr period after the baby is 7 days old  No stool for 48 hours  Redness, drainage or foul odor from the  umbilical cord.    If you have additional questions about these discharge instructions, please call your pediatrician.    Reducing the risk for sudden infant death syndrome (SIDS) and other sleep-related infant deaths  Here are recommendations from the American Academy of Pediatrics (AAP) on how to reduce the risk for SIDS and sleep-related deaths from birth to 1 year old:  Have your baby immunized. An infant who is fully immunized may reduce his or her risk for SIDS.  Breastfeed your baby. The AAP recommends breastmilk only for at least 6 months.  Place your baby on their back for all sleep and naps until they are 1 year old. This can reduce the risk for SIDS, breathing in food or a foreign object (aspiration), and choking. Never place your baby on their side or stomach for sleep or naps. If your baby is awake, give your child time on their tummy as long as you are watching. This can reduce the chance that your child will develop a flat head.  Always talk with your baby's healthcare provider before raising the head of the crib if your baby has been diagnosed with gastroesophageal reflux.  Offer your baby a pacifier for sleeping or naps. If your baby is breastfeeding, don't use a pacifier until breastfeeding has been fully established.  Use a firm mattress that is covered by a tightly fitted sheet. This can prevent gaps between the mattress and the sides of a crib, a play yard, or a bassinet. That can reduce the risk of the baby getting stuck between the mattress and the sides (entrapment). It can also reduce the risk of suffocation and SIDS.  Share your room instead of your bed with your baby. Putting your baby in bed with you raises the risk for strangulation, suffocation, entrapment, and SIDS. Bed sharing is not recommended for twins or other multiples. The AAP recommends that infants sleep in the same room as their parents, close to their parents' bed. But babies should be in a separate bed or crib appropriate  for infants. This sleeping arrangement is recommended ideally for the baby's first year. But it should at least be maintained for the first 6 months.  Don't use infant seats, car seats, strollers, infant carriers, and infant swings for routine sleep and daily naps. These may lead to blockage of an infant's airway or suffocation.  Don't put infants on a couch or armchair for sleep. Sleeping on a couch or armchair puts the baby at a much higher risk of death, including SIDS.  Don't use illegal drugs and alcohol, and don't smoke during pregnancy or after birth. Keep your baby away from others who are smoking and places where others smoke.  Don't overbundle, overdress, or cover your baby's face or head. This will prevent them from getting overheated, reducing the risk for SIDS.  Don't use loose bedding or soft objects (bumper pads, pillows, comforters, blankets) in your baby's crib or bassinet. This can help prevent suffocation, strangulation, entrapment, or SIDS.  Don't use home cardiorespiratory monitors and commercial devices (wedges, positioners, and special mattresses) to help reduce the risk for SIDS and sleep-related infant deaths. These devices have never been shown to reduce the risk of SIDS. In rare cases, they have caused infant deaths.  Always place cribs, bassinets, and play yards in places with no dangling cords, wires, or window coverings. This can reduce the risk for strangulation.     © 1352-6804 [employer]. [address] [place] [address], [place] [address]. All rights reserved. This information is not intended as a substitute for professional medical care. Always follow your healthcare professional's instructions.

## 2023-01-16 ENCOUNTER — OFFICE VISIT (OUTPATIENT)
Dept: FAMILY MEDICINE CLINIC | Age: 68
End: 2023-01-16
Payer: COMMERCIAL

## 2023-01-16 VITALS
SYSTOLIC BLOOD PRESSURE: 128 MMHG | HEART RATE: 63 BPM | BODY MASS INDEX: 29.01 KG/M2 | OXYGEN SATURATION: 98 % | RESPIRATION RATE: 12 BRPM | DIASTOLIC BLOOD PRESSURE: 68 MMHG | TEMPERATURE: 97.2 F | WEIGHT: 202.19 LBS

## 2023-01-16 DIAGNOSIS — R10.10 UPPER ABDOMINAL PAIN: ICD-10-CM

## 2023-01-16 DIAGNOSIS — J43.9 PULMONARY EMPHYSEMA, UNSPECIFIED EMPHYSEMA TYPE (HCC): ICD-10-CM

## 2023-01-16 DIAGNOSIS — I10 ESSENTIAL HYPERTENSION: ICD-10-CM

## 2023-01-16 DIAGNOSIS — N18.30 CHRONIC RENAL FAILURE, STAGE 3 (MODERATE), UNSPECIFIED WHETHER STAGE 3A OR 3B CKD (HCC): Primary | ICD-10-CM

## 2023-01-16 DIAGNOSIS — F51.01 PRIMARY INSOMNIA: ICD-10-CM

## 2023-01-16 PROCEDURE — 99214 OFFICE O/P EST MOD 30 MIN: CPT | Performed by: FAMILY MEDICINE

## 2023-01-16 PROCEDURE — 3074F SYST BP LT 130 MM HG: CPT | Performed by: FAMILY MEDICINE

## 2023-01-16 PROCEDURE — 1123F ACP DISCUSS/DSCN MKR DOCD: CPT | Performed by: FAMILY MEDICINE

## 2023-01-16 PROCEDURE — 3078F DIAST BP <80 MM HG: CPT | Performed by: FAMILY MEDICINE

## 2023-01-16 ASSESSMENT — PATIENT HEALTH QUESTIONNAIRE - PHQ9
SUM OF ALL RESPONSES TO PHQ QUESTIONS 1-9: 0
SUM OF ALL RESPONSES TO PHQ9 QUESTIONS 1 & 2: 0
SUM OF ALL RESPONSES TO PHQ QUESTIONS 1-9: 0
1. LITTLE INTEREST OR PLEASURE IN DOING THINGS: 0
SUM OF ALL RESPONSES TO PHQ QUESTIONS 1-9: 0
2. FEELING DOWN, DEPRESSED OR HOPELESS: 0
SUM OF ALL RESPONSES TO PHQ QUESTIONS 1-9: 0

## 2023-01-16 NOTE — PROGRESS NOTES
Subjective:      Patient ID: Yecenia Duong is a 74 y.o. male. CC: Patient presents for re-evaluation of chronic health problems including chronic renal failure, hypertension, upper abdominal pain, insomnia and emphysema. HPI pt is here for a follow up in accompaniment of wife. Since last appointment time he has undergone successful surgery of his rotator cuff tear and he is doing recovery. He did have pulmonary function testing performed which demonstrated moderate COPD but good reversibility with bronchodilation. He is using the Bevespi inhaler at nighttime but not through the day. He has not used any rescue inhaler since he is been back on the [employer] inhaler. Patient states he does not sleep well at nighttime he typically goes to bed at 8:00 this. To let 10 and he awakens at 3 AM and from thereon he is awake until he wakes up with his wife at 4:00. Through the day he does not take any daytime naps and avoid the caffeine late in the day. He is also been complaining of some upper abdominal pain he points to the mid epigastric area. No particular rhyme or reason when this occurs and not related to food. He states his bowels are working normal for him once or twice a day. Patient's had prior abdominal surgery in the past for mesenteric ischemia. .    Review of Systems  Patient Active Problem List   Diagnosis    Anxiety state    Essential hypertension    Generalized osteoarthrosis, involving multiple sites    Mixed hyperlipidemia    Rosacea    COPD (chronic obstructive pulmonary disease) (HCC)    Hypothyroid    Chest pain    Urinary hesitancy due to benign prostatic hyperplasia    Primary osteoarthritis of right knee    Chronic renal failure, stage 3 (moderate) (HCC)    Pinguecula    Paroxysmal atrial fibrillation (HCC)    PVD (peripheral vascular disease) ([facility] [address]    H/O thrombosis       Outpatient Medications Marked as Taking for the 1/16/23 encounter (Office Visit) with Elin Ochoa MD Medication Sig Dispense Refill    acetaminophen (TYLENOL) 500 MG tablet Take 2 tablets by mouth 3 times daily      lovastatin (MEVACOR) 10 MG tablet TAKE 1 TABLET NIGHTLY 90 tablet 1    glycopyrrolate-formoterol (BEVESPI AEROSPHERE) 9-4.8 MCG/ACT AERO Inhale 2 puffs into the lungs 2 times daily 3 each 3    apixaban (ELIQUIS) 5 MG TABS tablet Take 1 tablet by mouth 2 times daily Take 5 mg by mouth 2 times daily 180 tablet 3    losartan (COZAAR) 100 MG tablet Take 100 mg by mouth at bedtime      colchicine (COLCRYS) 0.6 MG tablet Take 2 tabs first day and then another tablet few hours later then 1 tab daily until symptoms resolved. 30 tablet 3    fluticasone (CUTIVATE) 0.05 % cream Apply topically 2 times daily as needed.  60 g 3    NIFEdipine (PROCARDIA XL) 60 MG extended release tablet Take by mouth daily      levothyroxine (SYNTHROID) 50 MCG tablet TAKE 1 TABLET DAILY 90 tablet 3    Coenzyme Q10 (CO Q 10 PO) Take by mouth      albuterol sulfate HFA (PROAIR HFA) 108 (90 Base) MCG/ACT inhaler Inhale 2 puffs into the lungs every 6 hours as needed for Wheezing 1 Inhaler 5    azelastine (ASTELIN) 0.1 % nasal spray 1 spray by Nasal route daily Use in each nostril as directed 1 Bottle     fluticasone (FLONASE) 50 MCG/ACT nasal spray 2 sprays by Nasal route daily         Allergies   Allergen Reactions    Diovan [Valsartan] Itching    Ezetimibe-Simvastatin      Can't remember reaction    Ezetimibe-Simvastatin     Clarithromycin Rash     itching  itching  itching  itching       Social History     Tobacco Use    Smoking status: Former     Packs/day: 2.50     Years: 33.00     Pack years: 82.50     Types: Cigarettes     Quit date: 2006     Years since quittin.1    Smokeless tobacco: Never    Tobacco comments:     smoked for 33 yrs / smoked up to 2.5   Substance Use Topics    Alcohol use: Not Currently     Comment: occasional alcohol use        /68 (Site: Left Upper Arm, Position: Sitting, Cuff Size: Large Adult)   Pulse 63   Temp 97.2 °F (36.2 °C) (Infrared)   Resp 12   Wt 202 lb 3 oz (91.7 kg)   SpO2 98%   BMI 29.01 kg/m²      Objective:   Physical Exam  Constitutional:       General: He is not in acute distress. Appearance: Normal appearance. He is well-developed. Neck:      Vascular: No carotid bruit. Cardiovascular:      Rate and Rhythm: Normal rate and regular rhythm. Pulses:           Dorsalis pedis pulses are 2+ on the right side and 2+ on the left side. Posterior tibial pulses are 2+ on the right side and 2+ on the left side. Heart sounds: Normal heart sounds. No murmur heard. No friction rub. No gallop. Pulmonary:      Effort: Pulmonary effort is normal.      Breath sounds: Rhonchi present. No decreased breath sounds or wheezing. Abdominal:      General: Bowel sounds are normal. There is no distension. Palpations: Abdomen is soft. Abdomen is not rigid. There is no hepatomegaly, splenomegaly or mass. Tenderness: There is no abdominal tenderness. There is no right CVA tenderness, left CVA tenderness, guarding or rebound. Hernia: No hernia is present. Musculoskeletal:         General: No tenderness. Normal range of motion. Right lower leg: No edema. Left lower leg: No edema. Skin:     General: Skin is warm. Findings: No rash. Neurological:      Mental Status: He is alert and oriented to person, place, and time. Mental status is at baseline. Sensory: Sensation is intact. Motor: Motor function is intact. Psychiatric:         Behavior: Behavior is cooperative. Assessment:      Feliz Garay was seen today for follow-up, hyperlipidemia and hypertension. Diagnoses and all orders for this visit:    Chronic renal failure, stage 3 (moderate), unspecified whether stage 3a or 3b CKD ([facility] [address]  -     CBC; Future  -     Comprehensive Metabolic Panel; Future    Essential hypertension  -     CBC;  Future  -     Comprehensive Metabolic Panel; Future    Upper abdominal pain    Pulmonary emphysema, unspecified emphysema type (HCC)    Primary insomnia    Other orders  -     glycopyrrolate-formoterol (BEVESPI AEROSPHERE) 9-4.8 MCG/ACT AERO; Inhale 2 puffs into the lungs daily          Plan:      Proceed with laboratory testing regards to chronic renal failure and hypertension.    Maintain current medications.    The upper abdominal pain is probably secondary to underlying scarring.  Advised patient and wife that his lungs that he was eating healthy and not have any issues with his digestive tract then no additional intervention should be performed at this time.  He just had a colonoscopy in 2018.  Abdominal CT scan was done less than a year ago which demonstrated prior scarring from surgery.    The insomnia does require any additional intervention.  We discussed the rules of sleep hygiene    Continue with pulmonary care    RTC 6 months        Please note that this chart was generated using Dragon dictation software. Although every effort was made to ensure the accuracy of this automated transcription, some errors in transcription may have occurred.

## 2023-02-28 ENCOUNTER — TELEPHONE (OUTPATIENT)
Dept: PULMONOLOGY | Age: 68
End: 2023-02-28

## 2023-02-28 DIAGNOSIS — R91.1 LUNG NODULE: ICD-10-CM

## 2023-02-28 DIAGNOSIS — Z87.891 SMOKING HISTORY: Primary | ICD-10-CM

## 2023-02-28 NOTE — TELEPHONE ENCOUNTER
Pt's wife called and said insurance won't pay for the ct lung screen, it's been longer than 15 yrs, it needs to be a regular ct scan. Call her to let her know when order is changed.     Ph [phone]

## 2023-03-02 DIAGNOSIS — R91.1 LUNG NODULE: Primary | ICD-10-CM

## 2023-03-02 NOTE — TELEPHONE ENCOUNTER
Pt's wife called and said the new order needs to be changed. Remove code P54.760 and keep R91.1    She asked for someone to call her.

## 2023-03-14 RX ORDER — LEVOTHYROXINE SODIUM 0.05 MG/1
TABLET ORAL
Qty: 90 TABLET | Refills: 1 | Status: SHIPPED | OUTPATIENT
Start: 2023-03-14

## 2023-03-14 RX ORDER — LOVASTATIN 10 MG/1
TABLET ORAL
Qty: 90 TABLET | Refills: 1 | Status: SHIPPED | OUTPATIENT
Start: 2023-03-14

## 2023-03-14 NOTE — TELEPHONE ENCOUNTER
Medication:   Requested Prescriptions     Pending Prescriptions Disp Refills    levothyroxine (SYNTHROID) 50 MCG tablet 90 tablet 3     Sig: TAKE 1 TABLET DAILY    lovastatin (MEVACOR) 10 MG tablet 90 tablet 1     Sig: TAKE 1 TABLET NIGHTLY      Last Filled:      Patient Phone Number: [phone] (home) [phone] (work)    Last appt: 1/16/2023   Next appt: 7/21/2023    Last OARRS: No flowsheet data found.   PDMP Monitoring:    Last PDMP Krzysztof warner Reviewed [facility]):  Review User Review Instant Review Result          Preferred Pharmacy:   [facility] , [address] [phone] - F [phone]  [address] [place] [address]  Phone: [phone] Fax: [phone]

## 2023-03-16 ENCOUNTER — TELEPHONE (OUTPATIENT)
Dept: FAMILY MEDICINE CLINIC | Age: 68
End: 2023-03-16

## 2023-03-16 NOTE — TELEPHONE ENCOUNTER
Called to let provider know coverage denied medication (SYNTHROID) wondered if there was another medication that may get approved.   Please Anant Aceves

## 2023-03-21 NOTE — TELEPHONE ENCOUNTER
Talked to [place] at [address] and she said Synthroid brand is not on formulary and not covered. The Levothyroxine is covered on the formulary. The patient/family is ok for the generic.

## 2023-03-23 ENCOUNTER — HOSPITAL ENCOUNTER (OUTPATIENT)
Dept: CT IMAGING | Age: 68
Discharge: HOME | End: 2023-03-23
Payer: COMMERCIAL

## 2023-03-23 DIAGNOSIS — R91.1 LUNG NODULE: ICD-10-CM

## 2023-03-23 PROCEDURE — 71250 CT THORAX DX C-: CPT

## 2023-03-27 ENCOUNTER — TELEPHONE (OUTPATIENT)
Dept: PULMONOLOGY | Age: 68
End: 2023-03-27

## 2023-03-27 NOTE — TELEPHONE ENCOUNTER
Pt wife called to get results of ct scan, and wanted to know if pt should come in sooner?      # [phone]

## 2023-03-30 ENCOUNTER — TELEPHONE (OUTPATIENT)
Dept: PULMONOLOGY | Age: 68
End: 2023-03-30

## 2023-03-30 RX ORDER — DOXYCYCLINE HYCLATE 100 MG/1
100 CAPSULE ORAL 2 TIMES DAILY
COMMUNITY

## 2023-04-17 ENCOUNTER — TELEPHONE (OUTPATIENT)
Dept: CARDIOLOGY CLINIC | Age: 68
End: 2023-04-17

## 2023-04-17 ENCOUNTER — OFFICE VISIT (OUTPATIENT)
Dept: PULMONOLOGY | Age: 68
End: 2023-04-17
Payer: COMMERCIAL

## 2023-04-17 VITALS
HEIGHT: 70 IN | BODY MASS INDEX: 29.06 KG/M2 | WEIGHT: 203 LBS | DIASTOLIC BLOOD PRESSURE: 60 MMHG | SYSTOLIC BLOOD PRESSURE: 134 MMHG | OXYGEN SATURATION: 97 % | HEART RATE: 64 BPM

## 2023-04-17 DIAGNOSIS — R91.8 MULTIPLE LUNG NODULES ON CT: Primary | ICD-10-CM

## 2023-04-17 DIAGNOSIS — E78.2 MIXED HYPERLIPIDEMIA: Primary | ICD-10-CM

## 2023-04-17 DIAGNOSIS — J44.9 COPD, MODERATE (HCC): ICD-10-CM

## 2023-04-17 PROBLEM — R06.02 SOB (SHORTNESS OF BREATH): Status: ACTIVE | Noted: 2023-04-17

## 2023-04-17 PROCEDURE — 3078F DIAST BP <80 MM HG: CPT | Performed by: INTERNAL MEDICINE

## 2023-04-17 PROCEDURE — 99214 OFFICE O/P EST MOD 30 MIN: CPT | Performed by: INTERNAL MEDICINE

## 2023-04-17 PROCEDURE — 3075F SYST BP GE 130 - 139MM HG: CPT | Performed by: INTERNAL MEDICINE

## 2023-04-17 PROCEDURE — 1123F ACP DISCUSS/DSCN MKR DOCD: CPT | Performed by: INTERNAL MEDICINE

## 2023-04-17 ASSESSMENT — ENCOUNTER SYMPTOMS
CHEST TIGHTNESS: 0
SHORTNESS OF BREATH: 1
APNEA: 0
RHINORRHEA: 0
BLOOD IN STOOL: 0
SINUS PRESSURE: 0
ABDOMINAL PAIN: 0
CHOKING: 0
STRIDOR: 0
WHEEZING: 0
DIARRHEA: 0
CONSTIPATION: 0
VOICE CHANGE: 0
ANAL BLEEDING: 0
COUGH: 0
ABDOMINAL DISTENTION: 0
BACK PAIN: 0
SORE THROAT: 0

## 2023-04-17 NOTE — PROGRESS NOTES
Anjana Levine    YOB: 1955     Date of Service:  4/17/2023     Chief Complaint   Patient presents with    COPD    Pulmonary Nodule    Results     CT chest          HPI has been accompanied by his wife to our office today. States that he had chest cold like symptoms 2 to 3 weeks ago associated with a mild fever. He had significant cough with clear phlegm, which cleared up with course of doxycycline that was prescribed for abnormal LDCT. Currently patient states that he is at his baseline with no significant with LÓPEZ and minimal cough. Allergies   Allergen Reactions    Diovan [Valsartan] Itching    Ezetimibe-Simvastatin      Can't remember reaction    Ezetimibe-Simvastatin     Clarithromycin Rash     itching  itching  itching  itching     Outpatient Medications Marked as Taking for the 4/17/23 encounter (Office Visit) with Radha Epperson MD   Medication Sig Dispense Refill    levothyroxine (SYNTHROID) 50 MCG tablet TAKE 1 TABLET DAILY 90 tablet 1    lovastatin (MEVACOR) 10 MG tablet TAKE 1 TABLET NIGHTLY 90 tablet 1    glycopyrrolate-formoterol (BEVESPI AEROSPHERE) 9-4.8 MCG/ACT AERO Inhale 2 puffs into the lungs daily 3 each 3    acetaminophen (TYLENOL) 500 MG tablet Take 2 tablets by mouth 3 times daily      apixaban (ELIQUIS) 5 MG TABS tablet Take 1 tablet by mouth 2 times daily Take 5 mg by mouth 2 times daily 180 tablet 3    losartan (COZAAR) 100 MG tablet Take 1 tablet by mouth at bedtime      colchicine (COLCRYS) 0.6 MG tablet Take 2 tabs first day and then another tablet few hours later then 1 tab daily until symptoms resolved. 30 tablet 3    fluticasone (CUTIVATE) 0.05 % cream Apply topically 2 times daily as needed.  60 g 3    NIFEdipine (PROCARDIA XL) 60 MG extended release tablet Take by mouth daily      Coenzyme Q10 (CO Q 10 PO) Take by mouth      albuterol sulfate HFA (PROAIR HFA) 108 (90 Base) MCG/ACT inhaler Inhale 2 puffs into the lungs every 6 hours as needed for

## 2023-04-17 NOTE — TELEPHONE ENCOUNTER
Called pt to remind them to check cholesterol before next appt. No answer. LMOM with callback. Orders are in to have cholesterol checked. [facility] DOES need to be fasting for this lab.

## 2023-04-25 DIAGNOSIS — E78.2 MIXED HYPERLIPIDEMIA: ICD-10-CM

## 2023-04-25 DIAGNOSIS — N18.30 CHRONIC RENAL FAILURE, STAGE 3 (MODERATE), UNSPECIFIED WHETHER STAGE 3A OR 3B CKD (HCC): ICD-10-CM

## 2023-04-25 DIAGNOSIS — I10 ESSENTIAL HYPERTENSION: ICD-10-CM

## 2023-04-25 LAB
ALBUMIN SERPL-MCNC: 4.4 G/DL (ref 3.4–5)
ALBUMIN/GLOB SERPL: 1.6 {RATIO} (ref 1.1–2.2)
ALP SERPL-CCNC: 79 U/L (ref 40–129)
ALT SERPL-CCNC: 58 U/L (ref 10–40)
ANION GAP SERPL CALCULATED.3IONS-SCNC: 12 MMOL/L (ref 3–16)
AST SERPL-CCNC: 37 U/L (ref 15–37)
BILIRUB DIRECT SERPL-MCNC: 0.3 MG/DL (ref 0–0.3)
BILIRUB INDIRECT SERPL-MCNC: 0.7 MG/DL (ref 0–1)
BILIRUB SERPL-MCNC: 1 MG/DL (ref 0–1)
BUN SERPL-MCNC: 13 MG/DL (ref 7–20)
CALCIUM SERPL-MCNC: 9.4 MG/DL (ref 8.3–10.6)
CHLORIDE SERPL-SCNC: 106 MMOL/L (ref 99–110)
CHOLEST SERPL-MCNC: 103 MG/DL (ref 0–199)
CO2 SERPL-SCNC: 22 MMOL/L (ref 21–32)
CREAT SERPL-MCNC: 1.2 MG/DL (ref 0.8–1.3)
DEPRECATED RDW RBC AUTO: 15.7 % (ref 12.4–15.4)
GFR SERPLBLD CREATININE-BSD FMLA CKD-EPI: >60 ML/MIN/{1.73_M2}
GLUCOSE SERPL-MCNC: 104 MG/DL (ref 70–99)
HCT VFR BLD AUTO: 42.5 % (ref 40.5–52.5)
HDLC SERPL-MCNC: 35 MG/DL (ref 40–60)
HGB BLD-MCNC: 14 G/DL (ref 13.5–17.5)
LDL CHOLESTEROL CALCULATED: 45 MG/DL
MCH RBC QN AUTO: 29.6 PG (ref 26–34)
MCHC RBC AUTO-ENTMCNC: 33 G/DL (ref 31–36)
MCV RBC AUTO: 89.8 FL (ref 80–100)
PLATELET # BLD AUTO: 231 K/UL (ref 135–450)
PMV BLD AUTO: 9.3 FL (ref 5–10.5)
POTASSIUM SERPL-SCNC: 3.6 MMOL/L (ref 3.5–5.1)
PROT SERPL-MCNC: 7.2 G/DL (ref 6.4–8.2)
RBC # BLD AUTO: 4.73 M/UL (ref 4.2–5.9)
SODIUM SERPL-SCNC: 140 MMOL/L (ref 136–145)
TRIGL SERPL-MCNC: 115 MG/DL (ref 0–150)
VLDLC SERPL CALC-MCNC: 23 MG/DL
WBC # BLD AUTO: 9.7 K/UL (ref 4–11)

## 2023-04-28 ENCOUNTER — OFFICE VISIT (OUTPATIENT)
Dept: CARDIOLOGY CLINIC | Age: 68
End: 2023-04-28
Payer: COMMERCIAL

## 2023-04-28 VITALS
SYSTOLIC BLOOD PRESSURE: 130 MMHG | HEIGHT: 70 IN | DIASTOLIC BLOOD PRESSURE: 56 MMHG | BODY MASS INDEX: 28.86 KG/M2 | OXYGEN SATURATION: 98 % | WEIGHT: 201.6 LBS | HEART RATE: 67 BPM

## 2023-04-28 DIAGNOSIS — R07.89 OTHER CHEST PAIN: Primary | ICD-10-CM

## 2023-04-28 DIAGNOSIS — I10 ESSENTIAL HYPERTENSION: ICD-10-CM

## 2023-04-28 DIAGNOSIS — I48.0 PAROXYSMAL ATRIAL FIBRILLATION (HCC): ICD-10-CM

## 2023-04-28 DIAGNOSIS — R00.1 BRADYCARDIA: ICD-10-CM

## 2023-04-28 DIAGNOSIS — E78.2 MIXED HYPERLIPIDEMIA: ICD-10-CM

## 2023-04-28 DIAGNOSIS — R06.02 SOB (SHORTNESS OF BREATH): ICD-10-CM

## 2023-04-28 DIAGNOSIS — J43.9 PULMONARY EMPHYSEMA, UNSPECIFIED EMPHYSEMA TYPE (HCC): ICD-10-CM

## 2023-04-28 PROCEDURE — 3078F DIAST BP <80 MM HG: CPT | Performed by: INTERNAL MEDICINE

## 2023-04-28 PROCEDURE — 99214 OFFICE O/P EST MOD 30 MIN: CPT | Performed by: INTERNAL MEDICINE

## 2023-04-28 PROCEDURE — 1123F ACP DISCUSS/DSCN MKR DOCD: CPT | Performed by: INTERNAL MEDICINE

## 2023-04-28 PROCEDURE — 3075F SYST BP GE 130 - 139MM HG: CPT | Performed by: INTERNAL MEDICINE

## 2023-06-09 ENCOUNTER — HOSPITAL ENCOUNTER (OUTPATIENT)
Dept: CT IMAGING | Age: 68
Discharge: HOME | End: 2023-06-09
Payer: COMMERCIAL

## 2023-06-09 DIAGNOSIS — R91.8 MULTIPLE LUNG NODULES ON CT: ICD-10-CM

## 2023-06-09 PROCEDURE — 71250 CT THORAX DX C-: CPT

## 2023-07-13 NOTE — TELEPHONE ENCOUNTER
Medication:   Requested Prescriptions     Pending Prescriptions Disp Refills    levothyroxine (SYNTHROID) 50 MCG tablet [Pharmacy Med Name: L-THYROXINE (SYNTHROID) TABS 50MCG] 90 tablet 0     Sig: TAKE 1 TABLET DAILY      Last Filled:      Patient Phone Number: [phone] (home) [phone] (work)    Last appt: 1/16/2023   Next appt: 7/21/2023    Last OARRS: No flowsheet data found.   PDMP Monitoring:    Last PDMP Delmar Edge as Reviewed [facility]):  Review User Review Instant Review Result          Preferred Pharmacy:   [address] [place] [address] [phone] - F [phone]  [address] [place] [address] Drive [address]  Phone: [phone] Fax: [phone]

## 2023-07-14 RX ORDER — LEVOTHYROXINE SODIUM 0.05 MG/1
TABLET ORAL
Qty: 90 TABLET | Refills: 0 | Status: SHIPPED | OUTPATIENT
Start: 2023-07-14

## 2023-07-18 RX ORDER — LOVASTATIN 10 MG/1
TABLET ORAL
Qty: 90 TABLET | Refills: 0 | Status: SHIPPED | OUTPATIENT
Start: 2023-07-18

## 2023-07-20 SDOH — ECONOMIC STABILITY: INCOME INSECURITY: HOW HARD IS IT FOR YOU TO PAY FOR THE VERY BASICS LIKE FOOD, HOUSING, MEDICAL CARE, AND HEATING?: NOT VERY HARD

## 2023-07-20 SDOH — ECONOMIC STABILITY: FOOD INSECURITY: WITHIN THE PAST 12 MONTHS, THE FOOD YOU BOUGHT JUST DIDN'T LAST AND YOU DIDN'T HAVE MONEY TO GET MORE.: NEVER TRUE

## 2023-07-20 SDOH — ECONOMIC STABILITY: FOOD INSECURITY: WITHIN THE PAST 12 MONTHS, YOU WORRIED THAT YOUR FOOD WOULD RUN OUT BEFORE YOU GOT MONEY TO BUY MORE.: NEVER TRUE

## 2023-07-20 SDOH — ECONOMIC STABILITY: HOUSING INSECURITY
IN THE LAST 12 MONTHS, WAS THERE A TIME WHEN YOU DID NOT HAVE A STEADY PLACE TO SLEEP OR SLEPT IN A SHELTER (INCLUDING NOW)?: NO

## 2023-07-20 SDOH — ECONOMIC STABILITY: TRANSPORTATION INSECURITY
IN THE PAST 12 MONTHS, HAS LACK OF TRANSPORTATION KEPT YOU FROM MEETINGS, WORK, OR FROM GETTING THINGS NEEDED FOR DAILY LIVING?: NO

## 2023-07-21 ENCOUNTER — OFFICE VISIT (OUTPATIENT)
Dept: FAMILY MEDICINE CLINIC | Age: 68
End: 2023-07-21
Payer: COMMERCIAL

## 2023-07-21 VITALS
TEMPERATURE: 97.8 F | SYSTOLIC BLOOD PRESSURE: 128 MMHG | RESPIRATION RATE: 12 BRPM | OXYGEN SATURATION: 98 % | WEIGHT: 199.38 LBS | HEART RATE: 63 BPM | DIASTOLIC BLOOD PRESSURE: 60 MMHG | BODY MASS INDEX: 28.61 KG/M2

## 2023-07-21 DIAGNOSIS — R73.9 HYPERGLYCEMIA: ICD-10-CM

## 2023-07-21 DIAGNOSIS — I48.0 PAROXYSMAL ATRIAL FIBRILLATION (HCC): ICD-10-CM

## 2023-07-21 DIAGNOSIS — K55.069 SUPERIOR MESENTERIC ARTERY THROMBOSIS (HCC): ICD-10-CM

## 2023-07-21 DIAGNOSIS — E03.9 ACQUIRED HYPOTHYROIDISM: ICD-10-CM

## 2023-07-21 DIAGNOSIS — I10 ESSENTIAL HYPERTENSION: Primary | ICD-10-CM

## 2023-07-21 DIAGNOSIS — E78.2 MIXED HYPERLIPIDEMIA: ICD-10-CM

## 2023-07-21 PROCEDURE — 3078F DIAST BP <80 MM HG: CPT | Performed by: FAMILY MEDICINE

## 2023-07-21 PROCEDURE — 99214 OFFICE O/P EST MOD 30 MIN: CPT | Performed by: FAMILY MEDICINE

## 2023-07-21 PROCEDURE — 3074F SYST BP LT 130 MM HG: CPT | Performed by: FAMILY MEDICINE

## 2023-07-21 PROCEDURE — 1123F ACP DISCUSS/DSCN MKR DOCD: CPT | Performed by: FAMILY MEDICINE

## 2023-07-21 SDOH — ECONOMIC STABILITY: INCOME INSECURITY: HOW HARD IS IT FOR YOU TO PAY FOR THE VERY BASICS LIKE FOOD, HOUSING, MEDICAL CARE, AND HEATING?: NOT HARD AT ALL

## 2023-07-21 SDOH — ECONOMIC STABILITY: FOOD INSECURITY: WITHIN THE PAST 12 MONTHS, YOU WORRIED THAT YOUR FOOD WOULD RUN OUT BEFORE YOU GOT MONEY TO BUY MORE.: NEVER TRUE

## 2023-07-21 SDOH — ECONOMIC STABILITY: FOOD INSECURITY: WITHIN THE PAST 12 MONTHS, THE FOOD YOU BOUGHT JUST DIDN'T LAST AND YOU DIDN'T HAVE MONEY TO GET MORE.: NEVER TRUE

## 2023-11-21 RX ORDER — APIXABAN 5 MG/1
5 TABLET, FILM COATED ORAL 2 TIMES DAILY
Qty: 180 TABLET | Refills: 0 | Status: SHIPPED | OUTPATIENT
Start: 2023-11-21

## 2023-12-14 DIAGNOSIS — R91.1 LUNG NODULE: Primary | ICD-10-CM

## 2023-12-14 DIAGNOSIS — Z87.891 SMOKING HISTORY: ICD-10-CM

## 2023-12-22 ENCOUNTER — TELEPHONE (OUTPATIENT)
Dept: FAMILY MEDICINE CLINIC | Age: 68
End: 2023-12-22

## 2023-12-22 NOTE — TELEPHONE ENCOUNTER
----- Message from Peggy Londono sent at 12/22/2023 10:25 AM EST -----  Subject: Referral Request    Reason for referral request? PSA blood work  Provider patient wants to be referred to(if known):     Provider Phone Number(if known): Additional Information for Provider?  Pt's wife thought PCP had mentioned   wanting this test done for upcoming appt, please order if needed  ---------------------------------------------------------------------------  --------------  Alexandria Marker INFO    [phone]; OK to leave message on voicemail  ---------------------------------------------------------------------------  --------------

## 2023-12-28 DIAGNOSIS — E03.9 ACQUIRED HYPOTHYROIDISM: Primary | ICD-10-CM

## 2023-12-28 DIAGNOSIS — Z12.5 SCREENING FOR PROSTATE CANCER: ICD-10-CM

## 2023-12-28 NOTE — TELEPHONE ENCOUNTER
Would recommend PSA screening and TSH with a diagnosis of prostate cancer screening and hypothyroidism

## 2024-01-03 DIAGNOSIS — E03.9 ACQUIRED HYPOTHYROIDISM: ICD-10-CM

## 2024-01-03 DIAGNOSIS — R73.9 HYPERGLYCEMIA: ICD-10-CM

## 2024-01-03 DIAGNOSIS — Z12.5 SCREENING FOR PROSTATE CANCER: ICD-10-CM

## 2024-01-03 DIAGNOSIS — I10 ESSENTIAL HYPERTENSION: ICD-10-CM

## 2024-01-03 LAB
ALBUMIN SERPL-MCNC: 4.5 G/DL (ref 3.4–5)
ALBUMIN/GLOB SERPL: 1.5 {RATIO} (ref 1.1–2.2)
ALP SERPL-CCNC: 72 U/L (ref 40–129)
ALT SERPL-CCNC: 22 U/L (ref 10–40)
ANION GAP SERPL CALCULATED.3IONS-SCNC: 11 MMOL/L (ref 3–16)
AST SERPL-CCNC: 23 U/L (ref 15–37)
BILIRUB SERPL-MCNC: 0.7 MG/DL (ref 0–1)
BUN SERPL-MCNC: 14 MG/DL (ref 7–20)
CALCIUM SERPL-MCNC: 9.2 MG/DL (ref 8.3–10.6)
CHLORIDE SERPL-SCNC: 106 MMOL/L (ref 99–110)
CO2 SERPL-SCNC: 26 MMOL/L (ref 21–32)
CREAT SERPL-MCNC: 1 MG/DL (ref 0.8–1.3)
GFR SERPLBLD CREATININE-BSD FMLA CKD-EPI: >60 ML/MIN/{1.73_M2}
GLUCOSE SERPL-MCNC: 67 MG/DL (ref 70–99)
POTASSIUM SERPL-SCNC: 4.1 MMOL/L (ref 3.5–5.1)
PROT SERPL-MCNC: 7.5 G/DL (ref 6.4–8.2)
SODIUM SERPL-SCNC: 143 MMOL/L (ref 136–145)

## 2024-01-04 LAB
EST. AVERAGE GLUCOSE BLD GHB EST-MCNC: 102.5 MG/DL
HBA1C MFR BLD: 5.2 %
PSA SERPL DL<=0.01 NG/ML-MCNC: 1.27 NG/ML (ref 0–4)
TSH SERPL DL<=0.005 MIU/L-ACNC: 3.38 UIU/ML (ref 0.27–4.2)

## 2024-01-15 ENCOUNTER — OFFICE VISIT (OUTPATIENT)
Dept: FAMILY MEDICINE CLINIC | Age: 69
End: 2024-01-15
Payer: COMMERCIAL

## 2024-01-15 VITALS
RESPIRATION RATE: 12 BRPM | TEMPERATURE: 97.3 F | WEIGHT: 199.13 LBS | HEART RATE: 71 BPM | BODY MASS INDEX: 28.57 KG/M2 | OXYGEN SATURATION: 98 % | SYSTOLIC BLOOD PRESSURE: 126 MMHG | DIASTOLIC BLOOD PRESSURE: 64 MMHG

## 2024-01-15 DIAGNOSIS — K55.069 SUPERIOR MESENTERIC ARTERY THROMBOSIS (HCC): ICD-10-CM

## 2024-01-15 DIAGNOSIS — N18.30 CHRONIC RENAL FAILURE, STAGE 3 (MODERATE), UNSPECIFIED WHETHER STAGE 3A OR 3B CKD (HCC): Primary | ICD-10-CM

## 2024-01-15 DIAGNOSIS — E78.2 MIXED HYPERLIPIDEMIA: ICD-10-CM

## 2024-01-15 DIAGNOSIS — E03.9 ACQUIRED HYPOTHYROIDISM: ICD-10-CM

## 2024-01-15 DIAGNOSIS — I48.0 PAROXYSMAL ATRIAL FIBRILLATION (HCC): ICD-10-CM

## 2024-01-15 DIAGNOSIS — J43.9 PULMONARY EMPHYSEMA, UNSPECIFIED EMPHYSEMA TYPE (HCC): ICD-10-CM

## 2024-01-15 DIAGNOSIS — M75.01 ADHESIVE CAPSULITIS OF RIGHT SHOULDER: ICD-10-CM

## 2024-01-15 PROBLEM — R06.02 SOB (SHORTNESS OF BREATH): Status: RESOLVED | Noted: 2023-04-17 | Resolved: 2024-01-15

## 2024-01-15 PROCEDURE — 1123F ACP DISCUSS/DSCN MKR DOCD: CPT | Performed by: FAMILY MEDICINE

## 2024-01-15 PROCEDURE — 99214 OFFICE O/P EST MOD 30 MIN: CPT | Performed by: FAMILY MEDICINE

## 2024-01-15 PROCEDURE — 3078F DIAST BP <80 MM HG: CPT | Performed by: FAMILY MEDICINE

## 2024-01-15 PROCEDURE — 3074F SYST BP LT 130 MM HG: CPT | Performed by: FAMILY MEDICINE

## 2024-01-15 RX ORDER — LEVOTHYROXINE SODIUM 0.05 MG/1
50 TABLET ORAL DAILY
Qty: 90 TABLET | Refills: 3 | Status: SHIPPED | OUTPATIENT
Start: 2024-01-15

## 2024-01-15 RX ORDER — FLUTICASONE PROPIONATE 0.05 %
CREAM (GRAM) TOPICAL
Qty: 60 G | Refills: 3 | Status: SHIPPED | OUTPATIENT
Start: 2024-01-15

## 2024-01-15 RX ORDER — LOVASTATIN 10 MG/1
10 TABLET ORAL NIGHTLY
Qty: 90 TABLET | Refills: 1 | Status: SHIPPED | OUTPATIENT
Start: 2024-01-15

## 2024-01-15 ASSESSMENT — PATIENT HEALTH QUESTIONNAIRE - PHQ9
SUM OF ALL RESPONSES TO PHQ QUESTIONS 1-9: 0
2. FEELING DOWN, DEPRESSED OR HOPELESS: 0
SUM OF ALL RESPONSES TO PHQ9 QUESTIONS 1 & 2: 0
1. LITTLE INTEREST OR PLEASURE IN DOING THINGS: 0
SUM OF ALL RESPONSES TO PHQ QUESTIONS 1-9: 0

## 2024-01-15 NOTE — PROGRESS NOTES
Subjective:      Patient ID: Ramona Gibbs is a 68 y.o. male.  CC: Patient presents for re-evaluation of chronic health problems including chronic renal failure, mesenteric artery thrombosis, paroxysmal atrial fibrillation, hypothyroidism and shoulder capsulitis.    HPI pt is here for a follow up, med refill, test results, and will like to discuss if he can take turmeric with eliquis.  Patient was evaluated by orthopedic specialist and was referred on to shoulder specialist but he is never followed through in that regards.  He still continues to have right shoulder pain and stiffness.  He had a recent evaluation with urology and recommendation was to start tamsulosin the patient denies any prostate issues and does not want to take additional medications.  He has nocturia x 1 and no daytime frequency or urgency.  Patient feels his asthma is well-controlled although the Bevespi inhaler medication is not covered under his new insurance plan and he has a call into pulmonology for a change.Patient is very compliant with medications with no adverse reactions.    Review of Systems  Patient Active Problem List   Diagnosis    Anxiety state    Essential hypertension    Generalized osteoarthrosis, involving multiple sites    Mixed hyperlipidemia    Rosacea    COPD (chronic obstructive pulmonary disease) ([facility])    Hypothyroid    Chest pain    Urinary hesitancy due to benign prostatic hyperplasia    Primary osteoarthritis of right knee    Chronic renal failure, stage 3 (moderate) ([facility])    Pinguecula    Paroxysmal atrial fibrillation ([facility])    PVD (peripheral vascular disease) ([facility])    H/O thrombosis    Bradycardia    SOB (shortness of breath)    Superior mesenteric artery thrombosis ([facility])       Outpatient Medications Marked as Taking for the 1/15/24 encounter (Office Visit) with Reece Hernandes MD   Medication Sig Dispense Refill    glycopyrrolate-formoterol (BEVESPI AEROSPHERE) 9-4.8 MCG/ACT AERO Inhale 2 puffs into the

## 2024-06-14 ENCOUNTER — HOSPITAL ENCOUNTER (OUTPATIENT)
Dept: CT IMAGING | Age: 69
Discharge: HOME | End: 2024-06-14
Attending: INTERNAL MEDICINE
Payer: COMMERCIAL

## 2024-06-14 DIAGNOSIS — Z87.891 SMOKING HISTORY: ICD-10-CM

## 2024-06-14 DIAGNOSIS — R91.1 LUNG NODULE: ICD-10-CM

## 2024-06-14 PROCEDURE — 71250 CT THORAX DX C-: CPT

## 2024-06-28 ENCOUNTER — TELEPHONE (OUTPATIENT)
Dept: PULMONOLOGY | Age: 69
End: 2024-06-28

## 2024-06-28 ENCOUNTER — OFFICE VISIT (OUTPATIENT)
Dept: PULMONOLOGY | Age: 69
End: 2024-06-28
Payer: COMMERCIAL

## 2024-06-28 VITALS
SYSTOLIC BLOOD PRESSURE: 128 MMHG | BODY MASS INDEX: 28.49 KG/M2 | WEIGHT: 199 LBS | HEART RATE: 61 BPM | DIASTOLIC BLOOD PRESSURE: 66 MMHG | HEIGHT: 70 IN | OXYGEN SATURATION: 98 %

## 2024-06-28 DIAGNOSIS — R91.8 LUNG INFILTRATE ON CT: ICD-10-CM

## 2024-06-28 DIAGNOSIS — J44.1 COPD WITH ACUTE EXACERBATION (HCC): Primary | ICD-10-CM

## 2024-06-28 PROCEDURE — 3078F DIAST BP <80 MM HG: CPT | Performed by: INTERNAL MEDICINE

## 2024-06-28 PROCEDURE — 1123F ACP DISCUSS/DSCN MKR DOCD: CPT | Performed by: INTERNAL MEDICINE

## 2024-06-28 PROCEDURE — 99214 OFFICE O/P EST MOD 30 MIN: CPT | Performed by: INTERNAL MEDICINE

## 2024-06-28 PROCEDURE — 3074F SYST BP LT 130 MM HG: CPT | Performed by: INTERNAL MEDICINE

## 2024-06-28 RX ORDER — PREDNISONE 10 MG/1
TABLET ORAL
Qty: 30 TABLET | Refills: 0 | Status: SHIPPED | OUTPATIENT
Start: 2024-06-28 | End: 2024-07-08

## 2024-06-28 RX ORDER — DOXYCYCLINE HYCLATE 100 MG
100 TABLET ORAL 2 TIMES DAILY
Qty: 14 TABLET | Refills: 0 | Status: SHIPPED | OUTPATIENT
Start: 2024-06-28 | End: 2024-07-05

## 2024-06-28 RX ORDER — UMECLIDINIUM BROMIDE AND VILANTEROL TRIFENATATE 62.5; 25 UG/1; UG/1
1 POWDER RESPIRATORY (INHALATION) DAILY
Qty: 1 EACH | Refills: 3 | Status: SHIPPED | OUTPATIENT
Start: 2024-06-28 | End: 2024-06-28

## 2024-06-28 RX ORDER — FLUTICASONE FUROATE, UMECLIDINIUM BROMIDE AND VILANTEROL TRIFENATATE 100; 62.5; 25 UG/1; UG/1; UG/1
1 POWDER RESPIRATORY (INHALATION) DAILY
Qty: 180 EACH | Refills: 2 | Status: SHIPPED | OUTPATIENT
Start: 2024-06-28

## 2024-06-28 ASSESSMENT — ENCOUNTER SYMPTOMS
STRIDOR: 0
CHOKING: 0
BACK PAIN: 0
RHINORRHEA: 0
BLOOD IN STOOL: 0
SINUS PRESSURE: 0
COUGH: 1
DIARRHEA: 0
ABDOMINAL DISTENTION: 0
VOICE CHANGE: 0
CHEST TIGHTNESS: 1
COLOR CHANGE: 0
SORE THROAT: 0
WHEEZING: 1
ABDOMINAL PAIN: 0
SHORTNESS OF BREATH: 1
VOMITING: 0
APNEA: 0
CONSTIPATION: 0

## 2024-06-28 NOTE — PROGRESS NOTES
Ramona Gibbs    YOB: 1955     Date of Service:  6/28/2024     Chief Complaint   Patient presents with    COPD    Pulmonary Nodule    Cough     Productive with clear mucus         HPI patient accompanied by his wife to our office today.  He has been having a congested cough with mucoid phlegm and wheezing for the last 3 to 4 months per report.  Denies any chest pain or fevers.  Tested positive for COVID-19 late December 2023.    Allergies   Allergen Reactions    Diovan [Valsartan] Itching    Ezetimibe-Simvastatin      Can't remember reaction    Ezetimibe-Simvastatin     Clarithromycin Rash     itching  itching  itching  itching     Outpatient Medications Marked as Taking for the 6/28/24 encounter (Office Visit) with Jus Orosco MD   Medication Sig Dispense Refill    doxycycline hyclate (VIBRA-TABS) 100 MG tablet Take 1 tablet by mouth 2 times daily for 7 days 14 tablet 0    predniSONE (DELTASONE) 10 MG tablet Take 40 mg by mouth for 3 days 30 mg for 3 days 20 mg for 3 days 10 mg for 3 days. 30 tablet 0    umeclidinium-vilanterol (ANORO ELLIPTA) 62.5-25 MCG/ACT inhaler Inhale 1 puff into the lungs daily 1 each 3    levothyroxine (SYNTHROID) 50 MCG tablet Take 1 tablet by mouth daily 90 tablet 3    fluticasone (CUTIVATE) 0.05 % cream Apply topically 2 times daily as needed. 60 g 3    lovastatin (MEVACOR) 10 MG tablet Take 1 tablet by mouth nightly 90 tablet 1    apixaban (ELIQUIS) 5 MG TABS tablet Take 1 tablet by mouth 2 times daily 180 tablet 0    glycopyrrolate-formoterol (BEVESPI AEROSPHERE) 9-4.8 MCG/ACT AERO Inhale 2 puffs into the lungs 2 times daily 3 each 3    acetaminophen (TYLENOL) 500 MG tablet Take 2 tablets by mouth 3 times daily (Patient taking differently: Take 2 tablets by mouth as needed)      losartan (COZAAR) 100 MG tablet Take 1 tablet by mouth at bedtime      colchicine (COLCRYS) 0.6 MG tablet Take 2 tabs first day and then another tablet few hours later then 1

## 2024-06-28 NOTE — TELEPHONE ENCOUNTER
Patients wife called and said [facility] can't get Anoro shipped out till July 7. Patients wife mentioned that Trelegy 100 and Anoro are the same cost and needs to be for a 90 day supply    [id number] - [facility] [address] - P [phone] - F [phone]    PH: [phone]

## 2024-07-01 RX ORDER — APIXABAN 5 MG/1
5 TABLET, FILM COATED ORAL 2 TIMES DAILY
Qty: 180 TABLET | Refills: 0 | Status: SHIPPED | OUTPATIENT
Start: 2024-07-01

## 2024-07-15 DIAGNOSIS — N18.30 CHRONIC RENAL FAILURE, STAGE 3 (MODERATE), UNSPECIFIED WHETHER STAGE 3A OR 3B CKD (HCC): ICD-10-CM

## 2024-07-15 DIAGNOSIS — E78.2 MIXED HYPERLIPIDEMIA: ICD-10-CM

## 2024-07-15 RX ORDER — LOVASTATIN 10 MG/1
10 TABLET ORAL NIGHTLY
Qty: 90 TABLET | Refills: 0 | Status: SHIPPED | OUTPATIENT
Start: 2024-07-15

## 2024-07-15 NOTE — TELEPHONE ENCOUNTER
Requested Prescriptions     Pending Prescriptions Disp Refills    lovastatin (MEVACOR) 10 MG tablet [Pharmacy Med Name: LOVASTATIN TABS 10MG] 90 tablet 3     Sig: TAKE 1 TABLET NIGHTLY     Last OV - 1/15/24  Next OV - 7/18/24  Last refill - 1/15/24  Last labs - 1/3/24

## 2024-07-16 LAB
ALBUMIN SERPL-MCNC: 4.5 G/DL (ref 3.4–5)
ALBUMIN/GLOB SERPL: 1.4 {RATIO} (ref 1.1–2.2)
ALP SERPL-CCNC: 71 U/L (ref 40–129)
ALT SERPL-CCNC: 21 U/L (ref 10–40)
ANION GAP SERPL CALCULATED.3IONS-SCNC: 13 MMOL/L (ref 3–16)
AST SERPL-CCNC: 23 U/L (ref 15–37)
BILIRUB SERPL-MCNC: 0.7 MG/DL (ref 0–1)
BUN SERPL-MCNC: 22 MG/DL (ref 7–20)
CALCIUM SERPL-MCNC: 9.7 MG/DL (ref 8.3–10.6)
CHLORIDE SERPL-SCNC: 102 MMOL/L (ref 99–110)
CHOLEST SERPL-MCNC: 127 MG/DL (ref 0–199)
CO2 SERPL-SCNC: 24 MMOL/L (ref 21–32)
CREAT SERPL-MCNC: 1.3 MG/DL (ref 0.8–1.3)
GFR SERPLBLD CREATININE-BSD FMLA CKD-EPI: 60 ML/MIN/{1.73_M2}
GLUCOSE SERPL-MCNC: 101 MG/DL (ref 70–99)
HDLC SERPL-MCNC: 44 MG/DL (ref 40–60)
LDLC SERPL CALC-MCNC: 59 MG/DL
POTASSIUM SERPL-SCNC: 4.4 MMOL/L (ref 3.5–5.1)
PROT SERPL-MCNC: 7.8 G/DL (ref 6.4–8.2)
SODIUM SERPL-SCNC: 139 MMOL/L (ref 136–145)
TRIGL SERPL-MCNC: 119 MG/DL (ref 0–150)
VLDLC SERPL CALC-MCNC: 24 MG/DL

## 2024-07-18 ENCOUNTER — OFFICE VISIT (OUTPATIENT)
Dept: FAMILY MEDICINE CLINIC | Age: 69
End: 2024-07-18
Payer: COMMERCIAL

## 2024-07-18 VITALS
BODY MASS INDEX: 28.98 KG/M2 | WEIGHT: 202 LBS | HEART RATE: 60 BPM | OXYGEN SATURATION: 98 % | TEMPERATURE: 97.4 F | DIASTOLIC BLOOD PRESSURE: 60 MMHG | SYSTOLIC BLOOD PRESSURE: 136 MMHG

## 2024-07-18 DIAGNOSIS — I48.0 PAROXYSMAL ATRIAL FIBRILLATION (HCC): ICD-10-CM

## 2024-07-18 DIAGNOSIS — K55.069 SUPERIOR MESENTERIC ARTERY THROMBOSIS (HCC): ICD-10-CM

## 2024-07-18 DIAGNOSIS — D68.69 SECONDARY HYPERCOAGULABLE STATE (HCC): ICD-10-CM

## 2024-07-18 DIAGNOSIS — Z12.5 SCREENING PSA (PROSTATE SPECIFIC ANTIGEN): ICD-10-CM

## 2024-07-18 DIAGNOSIS — F51.02 ADJUSTMENT INSOMNIA: ICD-10-CM

## 2024-07-18 DIAGNOSIS — I73.9 PVD (PERIPHERAL VASCULAR DISEASE) (HCC): ICD-10-CM

## 2024-07-18 DIAGNOSIS — R73.9 HYPERGLYCEMIA: ICD-10-CM

## 2024-07-18 DIAGNOSIS — E03.9 ACQUIRED HYPOTHYROIDISM: ICD-10-CM

## 2024-07-18 DIAGNOSIS — N18.30 CHRONIC RENAL FAILURE, STAGE 3 (MODERATE), UNSPECIFIED WHETHER STAGE 3A OR 3B CKD (HCC): Primary | ICD-10-CM

## 2024-07-18 DIAGNOSIS — J43.9 PULMONARY EMPHYSEMA, UNSPECIFIED EMPHYSEMA TYPE (HCC): ICD-10-CM

## 2024-07-18 PROCEDURE — G2211 COMPLEX E/M VISIT ADD ON: HCPCS | Performed by: FAMILY MEDICINE

## 2024-07-18 PROCEDURE — 3075F SYST BP GE 130 - 139MM HG: CPT | Performed by: FAMILY MEDICINE

## 2024-07-18 PROCEDURE — 99214 OFFICE O/P EST MOD 30 MIN: CPT | Performed by: FAMILY MEDICINE

## 2024-07-18 PROCEDURE — 3078F DIAST BP <80 MM HG: CPT | Performed by: FAMILY MEDICINE

## 2024-07-18 PROCEDURE — 1123F ACP DISCUSS/DSCN MKR DOCD: CPT | Performed by: FAMILY MEDICINE

## 2024-07-18 RX ORDER — ZOLPIDEM TARTRATE 5 MG/1
5 TABLET ORAL NIGHTLY PRN
Qty: 30 TABLET | Refills: 0 | Status: SHIPPED | OUTPATIENT
Start: 2024-07-18 | End: 2024-08-17

## 2024-07-18 NOTE — PROGRESS NOTES
Subjective   Patient ID: Ramona Gibbs is a 68 y.o. male.  CC: Patient presents for re-evaluation of chronic health problems including situational insomnia, chronic renal failure, hyperglycemia, and persistent respiratory issues..    HPI patient presents in accompaniment of wife stated has had more stress recently.  His daughter suddenly  of drug overdose.  He is having trouble sleeping with that.  He has a good support system and does not feel he is not depressed but very anxious.  He has good support system with family and wife.  He does request Ambien to take at nighttime for short-term.  He was recently on antibiotic therapy for left lower lobe infiltrate he still feels he is coughing.  He was prescribed Trelegy inhaler but he thought to cause him more heart racing episodes.  He is doing the Bevespi medication but he is unsure if he should continue with respiratory medications.    Review of Systems     Patient Active Problem List   Diagnosis    Anxiety state    Essential hypertension    Generalized osteoarthrosis, involving multiple sites    Mixed hyperlipidemia    Rosacea    COPD (chronic obstructive pulmonary disease) ([facility])    Hypothyroid    Urinary hesitancy due to benign prostatic hyperplasia    Primary osteoarthritis of right knee    Chronic renal failure, stage 3 (moderate) ([facility])    Paroxysmal atrial fibrillation (HCC)    PVD (peripheral vascular disease) ([facility])    H/O thrombosis    Bradycardia    Superior mesenteric artery thrombosis (HCC)    Adhesive capsulitis of right shoulder    Secondary hypercoagulable state ([facility])       Outpatient Medications Marked as Taking for the 24 encounter (Office Visit) with Reece Hernandes MD   Medication Sig Dispense Refill    apixaban (ELIQUIS) 5 MG TABS tablet Take 1 tablet by mouth 2 times daily 180 tablet 3       0    lovastatin (MEVACOR) 10 MG tablet TAKE 1 TABLET NIGHTLY 90 tablet 0    levothyroxine (SYNTHROID) 50 MCG tablet Take 1 tablet by mouth

## 2024-07-30 ENCOUNTER — TELEPHONE (OUTPATIENT)
Dept: PULMONOLOGY | Age: 69
End: 2024-07-30

## 2024-07-30 NOTE — TELEPHONE ENCOUNTER
Pt wife called and said that the pt isn't going to take Trelegy due to side effects, wants to go back on Bevespi.  She said Bevespi with need a PA.  [phone] [facility]

## 2024-08-01 NOTE — TELEPHONE ENCOUNTER
Called to let the patient that Bevespi was approved    Approved today by [facility] 2017  CaseId:[id number];Status:Approved;Review Type:Prior Auth;Coverage Start Date:07/02/2024;Coverage End Date:08/01/2025;

## 2024-08-02 ENCOUNTER — OFFICE VISIT (OUTPATIENT)
Dept: PULMONOLOGY | Age: 69
End: 2024-08-02

## 2024-08-02 ENCOUNTER — HOSPITAL ENCOUNTER (OUTPATIENT)
Age: 69
Discharge: HOME | End: 2024-08-02
Payer: COMMERCIAL

## 2024-08-02 ENCOUNTER — HOSPITAL ENCOUNTER (OUTPATIENT)
Dept: GENERAL RADIOLOGY | Age: 69
Discharge: HOME | End: 2024-08-02
Payer: COMMERCIAL

## 2024-08-02 VITALS
WEIGHT: 204 LBS | DIASTOLIC BLOOD PRESSURE: 66 MMHG | OXYGEN SATURATION: 98 % | HEART RATE: 59 BPM | SYSTOLIC BLOOD PRESSURE: 122 MMHG | HEIGHT: 70 IN | BODY MASS INDEX: 29.2 KG/M2

## 2024-08-02 DIAGNOSIS — J44.9 COPD, MODERATE (HCC): Primary | ICD-10-CM

## 2024-08-02 DIAGNOSIS — J21.9 BRONCHIOLITIS: ICD-10-CM

## 2024-08-02 DIAGNOSIS — J44.9 COPD, MODERATE (HCC): ICD-10-CM

## 2024-08-02 PROCEDURE — 71046 X-RAY EXAM CHEST 2 VIEWS: CPT

## 2024-08-02 ASSESSMENT — ENCOUNTER SYMPTOMS
APNEA: 0
CHOKING: 0
COLOR CHANGE: 0
BACK PAIN: 0
COUGH: 1
CONSTIPATION: 0
VOICE CHANGE: 0
CHEST TIGHTNESS: 0
RHINORRHEA: 0
SORE THROAT: 0
VOMITING: 0
BLOOD IN STOOL: 0
ABDOMINAL DISTENTION: 0
STRIDOR: 0
ABDOMINAL PAIN: 0
SINUS PRESSURE: 0
WHEEZING: 0
DIARRHEA: 0
SHORTNESS OF BREATH: 1

## 2024-08-02 NOTE — PROGRESS NOTES
prednisone.  States that his cough has pretty much remained the same throughout, but his dyspnea has not been an issue.  He states that he has had this cough chronically for over 2 years and wonders if he can use any medication for this.  Moderate obstruction noted on PFT from December 2022, FEV1 1.94 L [56% predicted] with significant bronchodilator reversibility.  Repeat PFT study will be requested today.    Patient attempted Trelegy 100 following his last visit but had symptoms related to dizziness/chest tightness and blurry vision lasting couple of hours after use of inhaler.  Hence, patient has gone back to using Bevespi inhaler and has been doing good.  Breztri inhaler could be an option, but we will hold off at this time.  Patient would use Mucinex OTC for cough/expectoration, could consider Daliresp if it becomes necessary    Left lower lobe bronchiolitis-faint infiltrates noted on LDCT from 6/14.  Will obtain chest x-ray today to confirm absence of progressive pneumonia.  Clinical exam did not reveal any wheeze or crackles.  If chest x-ray is okay, then we would only plan repeat LDCT in 1 year.    Return in about 3 months (around 11/2/2024).

## 2024-10-02 RX ORDER — NIFEDIPINE 60 MG/1
60 TABLET, EXTENDED RELEASE ORAL DAILY
Qty: 90 TABLET | Refills: 0 | Status: SHIPPED | OUTPATIENT
Start: 2024-10-02

## 2024-10-02 RX ORDER — LOSARTAN POTASSIUM 100 MG/1
100 TABLET ORAL DAILY
Qty: 90 TABLET | Refills: 0 | Status: SHIPPED | OUTPATIENT
Start: 2024-10-02

## 2024-10-02 RX ORDER — LOVASTATIN 10 MG
10 TABLET ORAL NIGHTLY
Qty: 90 TABLET | Refills: 0 | Status: SHIPPED | OUTPATIENT
Start: 2024-10-02

## 2024-10-02 RX ORDER — LEVOTHYROXINE SODIUM 50 UG/1
50 TABLET ORAL DAILY
Qty: 90 TABLET | Refills: 0 | Status: SHIPPED | OUTPATIENT
Start: 2024-10-02

## 2024-10-16 DIAGNOSIS — J42 CHRONIC BRONCHITIS, UNSPECIFIED CHRONIC BRONCHITIS TYPE (HCC): Primary | ICD-10-CM

## 2024-10-16 RX ORDER — LOSARTAN POTASSIUM 100 MG/1
100 TABLET ORAL DAILY
Qty: 90 TABLET | Refills: 1 | Status: SHIPPED | OUTPATIENT
Start: 2024-10-16

## 2024-10-16 RX ORDER — LOVASTATIN 10 MG
10 TABLET ORAL NIGHTLY
Qty: 90 TABLET | Refills: 1 | Status: SHIPPED | OUTPATIENT
Start: 2024-10-16

## 2024-10-16 RX ORDER — LEVOTHYROXINE SODIUM 50 UG/1
50 TABLET ORAL DAILY
Qty: 90 TABLET | Refills: 1 | Status: SHIPPED | OUTPATIENT
Start: 2024-10-16

## 2024-10-16 RX ORDER — NIFEDIPINE 60 MG/1
60 TABLET, EXTENDED RELEASE ORAL DAILY
Qty: 90 TABLET | Refills: 1 | Status: SHIPPED | OUTPATIENT
Start: 2024-10-16

## 2024-10-16 NOTE — TELEPHONE ENCOUNTER
Pt needs refills on:      glycopyrrolate-formoterol (BEVESPI AEROSPHERE) 9-4.8 MCG/ACT AERO     Send to:      [id number] - [place], [place] - [address] - P [phone] - F [phone] [address] [address]  Phone: [phone]  Fax: [phone]

## 2024-12-13 DIAGNOSIS — J44.9 CHRONIC OBSTRUCTIVE PULMONARY DISEASE, UNSPECIFIED COPD TYPE (HCC): Primary | ICD-10-CM

## 2024-12-30 DIAGNOSIS — J42 CHRONIC BRONCHITIS, UNSPECIFIED CHRONIC BRONCHITIS TYPE (HCC): ICD-10-CM

## 2024-12-30 NOTE — TELEPHONE ENCOUNTER
Patients wife called and patient needs a refill on medication    glycopyrrolate-formoterol (BEVESPI AEROSPHERE) 9-4.8 MCG/ACT AERO [[id number]]    90 day supply  1 refill     [id number] - [facility] [address] - P [phone] - F [phone]     PH: [phone]

## 2024-12-30 NOTE — TELEPHONE ENCOUNTER
Last appointment:  8/2/2024    Next appointment:  7/15/2025    Last refill: [unfilled]

## 2025-01-20 DIAGNOSIS — Z12.5 SCREENING PSA (PROSTATE SPECIFIC ANTIGEN): ICD-10-CM

## 2025-01-20 DIAGNOSIS — E03.9 ACQUIRED HYPOTHYROIDISM: ICD-10-CM

## 2025-01-20 DIAGNOSIS — N18.30 CHRONIC RENAL FAILURE, STAGE 3 (MODERATE), UNSPECIFIED WHETHER STAGE 3A OR 3B CKD (HCC): ICD-10-CM

## 2025-01-20 DIAGNOSIS — R73.9 HYPERGLYCEMIA: ICD-10-CM

## 2025-01-20 LAB
ANION GAP SERPL CALCULATED.3IONS-SCNC: 12 MMOL/L (ref 3–16)
BUN SERPL-MCNC: 17 MG/DL (ref 7–20)
CALCIUM SERPL-MCNC: 9.7 MG/DL (ref 8.3–10.6)
CHLORIDE SERPL-SCNC: 103 MMOL/L (ref 99–110)
CO2 SERPL-SCNC: 25 MMOL/L (ref 21–32)
CREAT SERPL-MCNC: 1.2 MG/DL (ref 0.8–1.3)
EST. AVERAGE GLUCOSE BLD GHB EST-MCNC: 96.8 MG/DL
GFR SERPLBLD CREATININE-BSD FMLA CKD-EPI: 66 ML/MIN/{1.73_M2}
GLUCOSE SERPL-MCNC: 95 MG/DL (ref 70–99)
HBA1C MFR BLD: 5 %
POTASSIUM SERPL-SCNC: 4.4 MMOL/L (ref 3.5–5.1)
PSA SERPL DL<=0.01 NG/ML-MCNC: 1.14 NG/ML (ref 0–4)
SODIUM SERPL-SCNC: 140 MMOL/L (ref 136–145)
TSH SERPL DL<=0.005 MIU/L-ACNC: 3.35 UIU/ML (ref 0.27–4.2)

## 2025-01-30 ENCOUNTER — OFFICE VISIT (OUTPATIENT)
Dept: PRIMARY CARE CLINIC | Age: 70
End: 2025-01-30

## 2025-01-30 VITALS
BODY MASS INDEX: 29.06 KG/M2 | HEART RATE: 70 BPM | WEIGHT: 203 LBS | HEIGHT: 70 IN | TEMPERATURE: 98 F | DIASTOLIC BLOOD PRESSURE: 68 MMHG | OXYGEN SATURATION: 98 % | SYSTOLIC BLOOD PRESSURE: 136 MMHG

## 2025-01-30 DIAGNOSIS — I10 PRIMARY HYPERTENSION: ICD-10-CM

## 2025-01-30 DIAGNOSIS — N18.30 STAGE 3 CHRONIC KIDNEY DISEASE, UNSPECIFIED WHETHER STAGE 3A OR 3B CKD (HCC): ICD-10-CM

## 2025-01-30 DIAGNOSIS — Z00.01 ENCOUNTER FOR ROUTINE ADULT HEALTH EXAMINATION WITH ABNORMAL FINDINGS: Primary | ICD-10-CM

## 2025-01-30 DIAGNOSIS — I73.9 PVD (PERIPHERAL VASCULAR DISEASE) (HCC): ICD-10-CM

## 2025-01-30 DIAGNOSIS — M10.00 IDIOPATHIC GOUT, UNSPECIFIED CHRONICITY, UNSPECIFIED SITE: ICD-10-CM

## 2025-01-30 DIAGNOSIS — J44.9 COPD, MODERATE (HCC): ICD-10-CM

## 2025-01-30 DIAGNOSIS — M75.01 ADHESIVE CAPSULITIS OF RIGHT SHOULDER: ICD-10-CM

## 2025-01-30 DIAGNOSIS — K55.069 SUPERIOR MESENTERIC ARTERY THROMBOSIS (HCC): ICD-10-CM

## 2025-01-30 DIAGNOSIS — E78.5 HYPERLIPIDEMIA, UNSPECIFIED HYPERLIPIDEMIA TYPE: ICD-10-CM

## 2025-01-30 DIAGNOSIS — E03.9 ACQUIRED HYPOTHYROIDISM: ICD-10-CM

## 2025-01-30 DIAGNOSIS — J43.9 PULMONARY EMPHYSEMA, UNSPECIFIED EMPHYSEMA TYPE (HCC): ICD-10-CM

## 2025-01-30 DIAGNOSIS — D68.69 SECONDARY HYPERCOAGULABLE STATE (HCC): ICD-10-CM

## 2025-01-30 DIAGNOSIS — I48.0 PAROXYSMAL ATRIAL FIBRILLATION (HCC): ICD-10-CM

## 2025-01-30 PROBLEM — I48.91 UNSPECIFIED ATRIAL FIBRILLATION (HCC): Status: ACTIVE | Noted: 2021-02-05

## 2025-01-30 RX ORDER — LOVASTATIN 10 MG/1
10 TABLET ORAL NIGHTLY
Qty: 90 TABLET | Refills: 1 | Status: SHIPPED | OUTPATIENT
Start: 2025-01-30

## 2025-01-30 RX ORDER — NIFEDIPINE 60 MG/1
60 TABLET, EXTENDED RELEASE ORAL DAILY
Qty: 90 TABLET | Refills: 1 | Status: SHIPPED | OUTPATIENT
Start: 2025-01-30

## 2025-01-30 RX ORDER — LOSARTAN POTASSIUM 100 MG/1
100 TABLET ORAL DAILY
Qty: 90 TABLET | Refills: 1 | Status: SHIPPED | OUTPATIENT
Start: 2025-01-30

## 2025-01-30 RX ORDER — LEVOTHYROXINE SODIUM 50 UG/1
50 TABLET ORAL DAILY
Qty: 90 TABLET | Refills: 1 | Status: SHIPPED | OUTPATIENT
Start: 2025-01-30

## 2025-01-30 SDOH — ECONOMIC STABILITY: FOOD INSECURITY: WITHIN THE PAST 12 MONTHS, THE FOOD YOU BOUGHT JUST DIDN'T LAST AND YOU DIDN'T HAVE MONEY TO GET MORE.: NEVER TRUE

## 2025-01-30 SDOH — ECONOMIC STABILITY: FOOD INSECURITY: WITHIN THE PAST 12 MONTHS, YOU WORRIED THAT YOUR FOOD WOULD RUN OUT BEFORE YOU GOT MONEY TO BUY MORE.: NEVER TRUE

## 2025-01-30 ASSESSMENT — PATIENT HEALTH QUESTIONNAIRE - PHQ9
SUM OF ALL RESPONSES TO PHQ QUESTIONS 1-9: 0
1. LITTLE INTEREST OR PLEASURE IN DOING THINGS: NOT AT ALL
SUM OF ALL RESPONSES TO PHQ9 QUESTIONS 1 & 2: 0
2. FEELING DOWN, DEPRESSED OR HOPELESS: NOT AT ALL

## 2025-01-30 ASSESSMENT — ENCOUNTER SYMPTOMS
VOMITING: 0
ABDOMINAL PAIN: 0
COUGH: 0
NAUSEA: 0
DIARRHEA: 0
SHORTNESS OF BREATH: 0

## 2025-01-30 NOTE — PROGRESS NOTES
Hepatitis B vaccine  Aged Out    Hib vaccine  Aged Out    Polio vaccine  Aged Out    Meningococcal (ACWY) vaccine  Aged Out    Diabetes screen  Discontinued    Lung Cancer Screening &/or Counseling  Discontinued    Prostate Specific Antigen (PSA) Screening or Monitoring  Discontinued       OBJECTIVE  Vitals:    01/30/25 0738   BP: 136/68   Site: Left Upper Arm   Position: Sitting   Cuff Size: Medium Adult   Pulse: 70   Temp: 98 °F (36.7 °C)   SpO2: 98%   Weight: 92.1 kg (203 lb)   Height: 1.79 m (5' 10.47\")     Estimated body mass index is 28.74 kg/m² as calculated from the following:    Height as of this encounter: 1.79 m (5' 10.47\").    Weight as of this encounter: 92.1 kg (203 lb).    Physical Exam  Vitals reviewed.   Constitutional:       General: He is not in acute distress.     Appearance: Normal appearance. He is not ill-appearing or toxic-appearing.   HENT:      Head: Normocephalic and atraumatic.      Right Ear: Tympanic membrane, ear canal and external ear normal. There is no impacted cerumen.      Left Ear: Tympanic membrane, ear canal and external ear normal. There is no impacted cerumen.      Nose: Nose normal.      Mouth/Throat:      Mouth: Mucous membranes are moist.      Pharynx: Oropharynx is clear.   Cardiovascular:      Rate and Rhythm: Normal rate and regular rhythm.      Heart sounds: Normal heart sounds. No murmur heard.     No friction rub. No gallop.   Pulmonary:      Effort: Pulmonary effort is normal.      Breath sounds: Normal breath sounds. No wheezing, rhonchi or rales.   Abdominal:      General: Abdomen is flat. There is no distension.      Palpations: Abdomen is soft. There is no mass.      Tenderness: There is no abdominal tenderness. There is no guarding or rebound.   Musculoskeletal:      Cervical back: Normal range of motion.      Comments: Decreased ROM right shoulder   Skin:     General: Skin is warm and dry.   Neurological:      General: No focal deficit present.      Mental

## 2025-01-30 NOTE — ASSESSMENT & PLAN NOTE
Stable  - He is on half of low intensity statin, given history of PVD and SMA thrombosis he would likely benefit from high intensity statin  - Vascular referral to discuss further/establish care

## 2025-01-30 NOTE — ASSESSMENT & PLAN NOTE
Cancer Screenings  Colorectal Cancer  Start at (45)-50 years and continuing until age 75  - High-sensitivity guaiac FOBT every year  - Stool DNA-FIT (Cologuard) every 3 years  - Flexible sigmoidoscopy every 5 years  - Colonoscopy every 10 years  UTD, due 2028    Smokers  Former- quit 19 years ago    Vaccinations  Influenza vaccine:  recommended yearly, but declined, Pneumonia vaccine: recommended, but declined, Tetanus vaccine:  recommended but declined, Shingles vaccine:  recommended, but declined    Lab Work  Routine labs UTD and normal. Reviewed with patient.     Depression Screening  PHQ-9 Total Score: 0 (1/30/2025  7:36 AM)

## 2025-01-30 NOTE — ASSESSMENT & PLAN NOTE
- BP today: 136/88, Goal BP: <150/90 per JNC8 Guidelines  - BP Log: No  - Current medication regimen: Losartan, nifedipine  - Diet/Exercise: No  - Tobacco Use: No  - Labs Reviewed:  BMP:   Lab Results   Component Value Date/Time     01/20/2025 07:40 AM    K 4.4 01/20/2025 07:40 AM     01/20/2025 07:40 AM    CO2 25 01/20/2025 07:40 AM    BUN 17 01/20/2025 07:40 AM    CREATININE 1.2 01/20/2025 07:40 AM    GLUCOSE 95 01/20/2025 07:40 AM    CALCIUM 9.7 01/20/2025 07:40 AM      Micro/Creatinine: No results found for: \"LABCREA\", \"MALBCR\"   Lipid:   Lab Results   Component Value Date/Time    TRIG 119 07/15/2024 07:20 AM    HDL 44 07/15/2024 07:20 AM     Plan  - Reviewed labs  - Continue losartan  - Continue following with Cardiology

## 2025-01-30 NOTE — ASSESSMENT & PLAN NOTE
Discussed starting allopurinol, patient would like to hold off for now as he has not had a flare up in years

## 2025-03-01 PROBLEM — Z00.01 ENCOUNTER FOR ROUTINE ADULT HEALTH EXAMINATION WITH ABNORMAL FINDINGS: Status: RESOLVED | Noted: 2025-01-30 | Resolved: 2025-03-01

## 2025-03-05 ENCOUNTER — TELEPHONE (OUTPATIENT)
Dept: CARDIOLOGY CLINIC | Age: 70
End: 2025-03-05

## 2025-03-05 NOTE — TELEPHONE ENCOUNTER
Julissa, wife called to inform the [facility] that the Pt bp is elevating and down coming down.    163/62      68        03/05     1:40pm  178/63      68        03/05     1:39pm  169/63      67        03/04     4:48pm  166/62      62        03/04     1:20pm    Pt has swelling in both legs.  Please call to discuss what can be done. Thank you

## 2025-03-05 NOTE — TELEPHONE ENCOUNTER
Spoke with patients wife while talking with her patient stated in the background that he was not having chest pain or dizziness. Patients legs have some swelling patient is also SOB after climbing around 20 steps.

## 2025-03-05 NOTE — TELEPHONE ENCOUNTER
Called and spoke with Ramona. BP has been up and down. Reports he has not missed any doses of medications. Sometimes his BP is in the 120-130 systolic range, sometimes it is 160s-170s. He has multiple BP cuffs at home. His wife feels his ankles are swollen, he is not certain if they are. States when he goes to his PCP his BP is normal but then it will be elevated at home. Recommend he come in for OV with home BP cuff. Scheduled to see NPTS tomorrow at [facility].

## 2025-03-06 ENCOUNTER — OFFICE VISIT (OUTPATIENT)
Dept: CARDIOLOGY CLINIC | Age: 70
End: 2025-03-06
Payer: COMMERCIAL

## 2025-03-06 VITALS
SYSTOLIC BLOOD PRESSURE: 162 MMHG | HEIGHT: 70 IN | BODY MASS INDEX: 30.06 KG/M2 | DIASTOLIC BLOOD PRESSURE: 62 MMHG | HEART RATE: 70 BPM | OXYGEN SATURATION: 98 % | WEIGHT: 210 LBS

## 2025-03-06 DIAGNOSIS — R00.1 BRADYCARDIA: ICD-10-CM

## 2025-03-06 DIAGNOSIS — I10 PRIMARY HYPERTENSION: Primary | ICD-10-CM

## 2025-03-06 DIAGNOSIS — E78.2 MIXED HYPERLIPIDEMIA: ICD-10-CM

## 2025-03-06 PROCEDURE — 3077F SYST BP >= 140 MM HG: CPT | Performed by: NURSE PRACTITIONER

## 2025-03-06 PROCEDURE — 93000 ELECTROCARDIOGRAM COMPLETE: CPT | Performed by: NURSE PRACTITIONER

## 2025-03-06 PROCEDURE — 3078F DIAST BP <80 MM HG: CPT | Performed by: NURSE PRACTITIONER

## 2025-03-06 PROCEDURE — 1123F ACP DISCUSS/DSCN MKR DOCD: CPT | Performed by: NURSE PRACTITIONER

## 2025-03-06 PROCEDURE — 99214 OFFICE O/P EST MOD 30 MIN: CPT | Performed by: NURSE PRACTITIONER

## 2025-03-06 RX ORDER — OLMESARTAN MEDOXOMIL 40 MG/1
40 TABLET ORAL DAILY
Qty: 30 TABLET | Refills: 0 | Status: SHIPPED | OUTPATIENT
Start: 2025-03-06

## 2025-03-06 NOTE — PROGRESS NOTES
17 01/20/2025     01/20/2025    K 4.4 01/20/2025     01/20/2025    CO2 25 01/20/2025     Lab Results   Component Value Date    TSH 3.35 01/20/2025     Lab Results   Component Value Date    WBC 9.7 04/25/2023    HGB 14.0 04/25/2023    HCT 42.5 04/25/2023    MCV 89.8 04/25/2023     04/25/2023     No components found for: \"CHLPL\"  Lab Results   Component Value Date    TRIG 119 07/15/2024    TRIG 115 07/13/2022     Lab Results   Component Value Date    HDL 44 07/15/2024    HDL 35 (L) 04/25/2023    HDL 45 07/13/2022       Radiology Review:  Pertinent images / reports were reviewed as a part of this visit and reveals the following:    Last Echo: July '08      Echo: Dec '20  Summary:   The left ventricular function is normal.   Overall left ventricular ejection fraction is estimated to be 60-65%.   There is moderate concentric left ventricular hypertrophy.   The left ventricular wall motion is normal.   Injection of agitated saline showed no interatrial shunt.      Last Stress Test: Oct '22   Summary   Normal myocardial perfusion study.   Normal LV size and systolic function.      Assessment:      Diagnosis Orders   1. Primary hypertension   ~suboptimal / elevated in office and comparable to home readings  ~normal LV systolic function on echo '08  ~mod CLVH on echo '20       2. Bradycardia   ~AP controlled  ~denies light headedness / fatigue EKG 12 lead      3. Mixed hyperlipidemia   ~controlled  ~low dose lovastatin 10 mg daily   ~TSH WNL ; A1c 5.0%         I had the opportunity to review the clinical symptoms and presentation of Ramona Gibbs.   Plan:     Stop losartan and begin benicar 40 mg daily  EKG: sinus rhythm 64  F/U as scheduled in 2 weeks with Dr. Baeza    Overall the patient is stable from CV standpoint    I have addresed the patient's cardiac risk factors and adjusted pharmacologic treatment as needed. In addition, I have reinforced the need for patient directed risk factor

## 2025-03-06 NOTE — PATIENT INSTRUCTIONS
Stop losartan and begin benicar 40 mg daily     Keep appt with Dr. Baeza 3/20    FYI: Nifedipine can cause swelling

## 2025-03-11 NOTE — PROGRESS NOTES
for 30+ yrs   Continues to be in cessation     COPD  PFTs 12/2022 moderate  On inhalers   Continue management with Dr. Bloom     Liver function   Hx elevated liver function  From excess tylenol use      Follow up 6 months with NP    No orders of the defined types were placed in this encounter.          Scott Baeza MD      Thank you for allowing to me to participate in the care of Ramona Gibbs.     Scribe's Attestation: This note was scribed in the presence of Dr. Scott Baeza MD by Nena Melo RN      I, Dr. Scott Baeza, personally performed the services described in this documentation, as scribed by the above signed scribe in my presence. It is both accurate and complete to my knowledge. I agree with the details independently gathered by the clinical support staff, while the remaining scribed note accurately describes my personal service to the patient.

## 2025-03-20 ENCOUNTER — OFFICE VISIT (OUTPATIENT)
Dept: CARDIOLOGY CLINIC | Age: 70
End: 2025-03-20
Payer: COMMERCIAL

## 2025-03-20 VITALS
SYSTOLIC BLOOD PRESSURE: 132 MMHG | OXYGEN SATURATION: 98 % | HEIGHT: 70 IN | DIASTOLIC BLOOD PRESSURE: 62 MMHG | WEIGHT: 205 LBS | BODY MASS INDEX: 29.35 KG/M2 | HEART RATE: 68 BPM

## 2025-03-20 DIAGNOSIS — N18.31 STAGE 3A CHRONIC KIDNEY DISEASE (HCC): ICD-10-CM

## 2025-03-20 DIAGNOSIS — R07.89 OTHER CHEST PAIN: Primary | ICD-10-CM

## 2025-03-20 DIAGNOSIS — I48.0 PAROXYSMAL ATRIAL FIBRILLATION (HCC): ICD-10-CM

## 2025-03-20 DIAGNOSIS — E78.2 MIXED HYPERLIPIDEMIA: ICD-10-CM

## 2025-03-20 DIAGNOSIS — I10 PRIMARY HYPERTENSION: ICD-10-CM

## 2025-03-20 DIAGNOSIS — J43.9 PULMONARY EMPHYSEMA, UNSPECIFIED EMPHYSEMA TYPE (HCC): ICD-10-CM

## 2025-03-20 DIAGNOSIS — R06.02 SOB (SHORTNESS OF BREATH): ICD-10-CM

## 2025-03-20 DIAGNOSIS — R00.1 BRADYCARDIA: ICD-10-CM

## 2025-03-20 PROCEDURE — 99214 OFFICE O/P EST MOD 30 MIN: CPT | Performed by: INTERNAL MEDICINE

## 2025-03-20 PROCEDURE — 3078F DIAST BP <80 MM HG: CPT | Performed by: INTERNAL MEDICINE

## 2025-03-20 PROCEDURE — 3075F SYST BP GE 130 - 139MM HG: CPT | Performed by: INTERNAL MEDICINE

## 2025-03-20 PROCEDURE — 1123F ACP DISCUSS/DSCN MKR DOCD: CPT | Performed by: INTERNAL MEDICINE

## 2025-03-20 RX ORDER — HYDROCHLOROTHIAZIDE 25 MG/1
25 TABLET ORAL EVERY MORNING
Qty: 90 TABLET | Refills: 3 | Status: SHIPPED | OUTPATIENT
Start: 2025-03-20 | End: 2025-03-20 | Stop reason: SINTOL

## 2025-03-20 RX ORDER — OLMESARTAN MEDOXOMIL 40 MG/1
40 TABLET ORAL DAILY
Qty: 90 TABLET | Refills: 3 | Status: SHIPPED | OUTPATIENT
Start: 2025-03-20

## 2025-03-20 RX ORDER — NIFEDIPINE 90 MG/1
90 TABLET, EXTENDED RELEASE ORAL DAILY
Qty: 90 TABLET | Refills: 3 | Status: SHIPPED | OUTPATIENT
Start: 2025-03-20

## 2025-03-28 NOTE — PROGRESS NOTES
reports that he does not use drugs.     Family History:  family history includes Cancer in his father; Emphysema in his mother; High Blood Pressure in his brother; Kidney Disease in his brother; Prostate Cancer in his brother and brother; Stroke (age of onset: 75) in his father.    Vital Signs  There were no vitals filed for this visit.    Physical Examination  General:  no apparent distress  Psychiatric: affect appropriate  Head/Eyes/Ears/Nose/Throat:  Atraumatic, vision and hearing intact, face symmetric  Neck:  supple  Chest/Lungs: clear to auscultation bilaterally  Cardiac:  Regular rate and rhythm  Abdomen: soft, nontender  Extremities: warm and well perfused  1+ edema  - bilateral upper extremity motorsensory intact  - bilateral lower extremity motorsensory intact  Vascular exam:  2+      Labs  Lab Results   Component Value Date/Time    WBC 9.7 04/25/2023 07:12 AM    HGB 14.0 04/25/2023 07:12 AM    HCT 42.5 04/25/2023 07:12 AM    MCV 89.8 04/25/2023 07:12 AM     04/25/2023 07:12 AM     Lab Results   Component Value Date/Time     01/20/2025 07:40 AM    K 4.4 01/20/2025 07:40 AM     01/20/2025 07:40 AM    CO2 25 01/20/2025 07:40 AM    BUN 17 01/20/2025 07:40 AM    CREATININE 1.2 01/20/2025 07:40 AM      No results found for: \"GLU\"    Imaging:         OSH (5/8/2023):    o Bilateral LEOBARDO (0.9-1.35) indicative of normal lower extremity arterial        pressure(s).      o No evidence of abdominal aortic aneurysm.      o Right common iliac artery is 1-49%.      o Left common iliac artery is 1-49%.      o Left external iliac artery is 1-49%.     Impression:       o Bilateral LEOBARDO (0.9-1.35) indicative of normal lower extremity arterial        pressure(s).      o Scattered plaque in bilateral lower extremity(s). no flow limiting        arterial stenosis seen.     OSH (4/25/2022):  IMPRESSION:   1. Focal superior mesenteric artery dissection, stable.   2. No significant aortoiliac inflow disease.   3.

## 2025-04-01 ENCOUNTER — OFFICE VISIT (OUTPATIENT)
Dept: VASCULAR SURGERY | Age: 70
End: 2025-04-01
Payer: COMMERCIAL

## 2025-04-01 VITALS
BODY MASS INDEX: 28.77 KG/M2 | HEIGHT: 70 IN | WEIGHT: 201 LBS | DIASTOLIC BLOOD PRESSURE: 78 MMHG | SYSTOLIC BLOOD PRESSURE: 140 MMHG

## 2025-04-01 DIAGNOSIS — K55.069 SUPERIOR MESENTERIC ARTERY THROMBOSIS: Primary | ICD-10-CM

## 2025-04-01 PROCEDURE — 1123F ACP DISCUSS/DSCN MKR DOCD: CPT | Performed by: SURGERY

## 2025-04-01 PROCEDURE — 3077F SYST BP >= 140 MM HG: CPT | Performed by: SURGERY

## 2025-04-01 PROCEDURE — 3078F DIAST BP <80 MM HG: CPT | Performed by: SURGERY

## 2025-04-01 PROCEDURE — 99203 OFFICE O/P NEW LOW 30 MIN: CPT | Performed by: SURGERY

## 2025-04-06 DIAGNOSIS — J42 CHRONIC BRONCHITIS, UNSPECIFIED CHRONIC BRONCHITIS TYPE (HCC): ICD-10-CM

## 2025-04-07 RX ORDER — GLYCOPYRROLATE AND FORMOTEROL FUMARATE 9; 4.8 UG/1; UG/1
AEROSOL, METERED RESPIRATORY (INHALATION)
Qty: 32.1 G | Refills: 3 | Status: SHIPPED | OUTPATIENT
Start: 2025-04-07

## 2025-04-07 NOTE — TELEPHONE ENCOUNTER
Patient's wife called for refill on Bevespi. Notified her that refill request was pu tin this morning, just awaiting Dr. Gorman to sign it. 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 She also wants to make sure its for 90days.

## 2025-06-03 ENCOUNTER — OFFICE VISIT (OUTPATIENT)
Dept: PRIMARY CARE CLINIC | Age: 70
End: 2025-06-03

## 2025-06-03 VITALS
OXYGEN SATURATION: 97 % | SYSTOLIC BLOOD PRESSURE: 134 MMHG | WEIGHT: 206 LBS | DIASTOLIC BLOOD PRESSURE: 80 MMHG | HEART RATE: 64 BPM | BODY MASS INDEX: 29.56 KG/M2

## 2025-06-03 DIAGNOSIS — I48.91 ATRIAL FIBRILLATION, UNSPECIFIED TYPE (HCC): ICD-10-CM

## 2025-06-03 DIAGNOSIS — E55.9 VITAMIN D DEFICIENCY: ICD-10-CM

## 2025-06-03 DIAGNOSIS — E78.5 HYPERLIPIDEMIA, UNSPECIFIED HYPERLIPIDEMIA TYPE: ICD-10-CM

## 2025-06-03 DIAGNOSIS — N18.2 STAGE 2 CHRONIC KIDNEY DISEASE: Primary | ICD-10-CM

## 2025-06-03 DIAGNOSIS — G47.00 INSOMNIA, UNSPECIFIED TYPE: ICD-10-CM

## 2025-06-03 DIAGNOSIS — I10 PRIMARY HYPERTENSION: ICD-10-CM

## 2025-06-03 RX ORDER — CHOLECALCIFEROL (VITAMIN D3) 1250 MCG
1 CAPSULE ORAL WEEKLY
COMMUNITY

## 2025-06-03 RX ORDER — OMEGA-3-ACID ETHYL ESTERS 1 G/1
2 CAPSULE, LIQUID FILLED ORAL DAILY
COMMUNITY
Start: 2025-05-16 | End: 2025-06-03 | Stop reason: ALTCHOICE

## 2025-06-03 RX ORDER — ATORVASTATIN CALCIUM 20 MG/1
20 TABLET, FILM COATED ORAL DAILY
Qty: 30 TABLET | Refills: 0 | Status: SHIPPED | OUTPATIENT
Start: 2025-06-03

## 2025-06-03 RX ORDER — TRAZODONE HYDROCHLORIDE 50 MG/1
50 TABLET ORAL NIGHTLY
Qty: 90 TABLET | Refills: 1 | Status: SHIPPED | OUTPATIENT
Start: 2025-06-03

## 2025-06-03 ASSESSMENT — ENCOUNTER SYMPTOMS
ABDOMINAL PAIN: 0
VOMITING: 0
DIARRHEA: 0
NAUSEA: 0
SHORTNESS OF BREATH: 0
COUGH: 0

## 2025-06-03 NOTE — PROGRESS NOTES
6/3/2025    SUBJECTIVE  Chief Complaint   Patient presents with    Other     Wants to discuss medications. 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 male w/ PMHx of HTN, HLD, hypothyroidism, COPD, pAFIB, Hx of SMA thrombosis, PVD, gout  presenting as an established patient for follow up of employee health lab results which showed Vit D deficiency.  He is requesting refill for eliquis and ambien.     Patient Active Problem List   Diagnosis    Primary hypertension    Generalized osteoarthrosis, involving multiple sites    Hyperlipidemia, unspecified    Insomnia    Rosacea    Chronic obstructive pulmonary disease, unspecified (HCC)    Hypothyroid    Urinary hesitancy due to benign prostatic hyperplasia    Primary osteoarthritis of right knee    Stage 2 chronic kidney disease    Unspecified atrial fibrillation (HCC)    PVD (peripheral vascular disease)    H/O thrombosis    Bradycardia    Superior mesenteric artery thrombosis    Adhesive capsulitis of right shoulder    Secondary hypercoagulable state    Idiopathic gout    Vitamin D deficiency       Review of Systems   Constitutional:  Negative for chills, fatigue and fever.   Respiratory:  Negative for cough and shortness of breath.    Cardiovascular:  Negative for chest pain.   Gastrointestinal:  Negative for abdominal pain, diarrhea, nausea and vomiting.   Musculoskeletal:  Negative for arthralgias.   Skin:  Negative for rash.   Neurological:  Negative for dizziness, syncope, weakness, light-headedness, numbness and headaches.       Prior to Visit Medications    Medication Sig Taking? Authorizing Provider   traZODone (DESYREL) 50 MG tablet Take 1 tablet by mouth nightly Yes Giovanni Portillo MD   atorvastatin (LIPITOR) 20 MG tablet Take 1 tablet by mouth daily Yes Giovanni Portillo MD   apixaban (ELIQUIS) 5 MG TABS tablet Take 1 tablet by mouth 2 times daily Yes Giovanni Portillo MD   BEVESPI AEROSPHERE 9-4.8

## 2025-06-03 NOTE — ASSESSMENT & PLAN NOTE
- BP today: 134/80, Goal BP: <150/90 per JNC8 Guidelines  - BP Log: No  - Current medication regimen: Losartan, nifedipine  - Diet/Exercise: No  - Tobacco Use: No  - Labs Reviewed:  BMP:   Lab Results   Component Value Date/Time     01/20/2025 07:40 AM    K 4.4 01/20/2025 07:40 AM     01/20/2025 07:40 AM    CO2 25 01/20/2025 07:40 AM    BUN 17 01/20/2025 07:40 AM    CREATININE 1.2 01/20/2025 07:40 AM    GLUCOSE 95 01/20/2025 07:40 AM    CALCIUM 9.7 01/20/2025 07:40 AM      Micro/Creatinine: No results found for: \"LABCREA\", \"MALBCR\"   Lipid:   Lab Results   Component Value Date/Time    TRIG 119 07/15/2024 07:20 AM    HDL 44 07/15/2024 07:20 AM     Plan  - Reviewed labs  - Continue losartan  - Continue following with Cardiology

## 2025-06-03 NOTE — ASSESSMENT & PLAN NOTE
We discussed Ambien not being a strong medication for insomnia especially in elderly patients  -We discussed over-the-counter melatonin being very safe and he could continue to use this  -Will transition him to 50 mg trazodone  -Follow-up in 1 month

## 2025-06-03 NOTE — ASSESSMENT & PLAN NOTE
Discussed Vit D results, risks and benefits of Vit D supplementation. He was prescribed the appropriate dosing, which he may start taking

## 2025-06-03 NOTE — ASSESSMENT & PLAN NOTE
Stable  - He is on half of low intensity statin, given history of PVD and SMA thrombosis he would likely benefit from high intensity statin  - Today he is more interested in making this change. Will transition to atorvastatin 20 mg for now, increase to 40 mg at next appt

## 2025-06-05 ENCOUNTER — TELEPHONE (OUTPATIENT)
Dept: CARDIOLOGY CLINIC | Age: 70
End: 2025-06-05

## 2025-06-05 DIAGNOSIS — I10 PRIMARY HYPERTENSION: Primary | ICD-10-CM

## 2025-06-05 RX ORDER — NIFEDIPINE 90 MG/1
90 TABLET, EXTENDED RELEASE ORAL DAILY
Qty: 90 TABLET | Refills: 3 | Status: SHIPPED | OUTPATIENT
Start: 2025-06-05

## 2025-06-05 NOTE — TELEPHONE ENCOUNTER
Medication Refill    Medication needing refilled:  NIFEdipine (PROCARDIA XL) 90 MG extended release tablet [[id number]]     Dosage of the medication:  90mg    How are you taking this medication (QD, BID, TID, QID, PRN):    1 tablet my mouth daily     30 or 90 day supply called in:  90    When will you run out of your medication:  Unsure    Which Pharmacy are we sending the medication to?:  [facility]  Fax: [phone]

## 2025-06-11 ENCOUNTER — TELEPHONE (OUTPATIENT)
Dept: PRIMARY CARE CLINIC | Age: 70
End: 2025-06-11

## 2025-06-11 NOTE — TELEPHONE ENCOUNTER
Returned call to wife Julissa who requests nifedipine 60mg  refill  and advised that  has not ever prescribed nifedipine and  increased Nifedipine to 90mg on 3/20/25 after in office visit. Wife objected to that stating  the way she understood the medication instructions was at first  discussed increasing dose of Nifedipine  to 90mg but decided to switch cholesterol medication to olmesartan instead. The olmesartan was sent to the pharmacy but so was the increased dose of Nifedipine. Wife states , previous pcp, managed the Nifedipine and would like  to mange it and return the dose to 60mg. Advised based on the cardiologists notes it's unlikely  will do so but wife requested clinician send the request stating if provider does not manage it and reduce dose she will then contact nephrologist.

## 2025-06-11 NOTE — TELEPHONE ENCOUNTER
Julissa called to say that Ramona needs a refill of his nifedipine 60 mg and not 90 mg. She returned the 90 mg that Dr. Baeza filled. She didn't know why he filled the wrong amount and why he refilled it when it should be Ramona's PCP. Please call when refill is sent to pharmacy.

## 2025-06-26 ENCOUNTER — HOSPITAL ENCOUNTER (OUTPATIENT)
Dept: CT IMAGING | Age: 70
Discharge: HOME | End: 2025-06-26
Attending: INTERNAL MEDICINE
Payer: MEDICARE

## 2025-06-26 DIAGNOSIS — J44.9 CHRONIC OBSTRUCTIVE PULMONARY DISEASE, UNSPECIFIED COPD TYPE (HCC): ICD-10-CM

## 2025-06-26 PROCEDURE — 71250 CT THORAX DX C-: CPT

## 2025-06-29 ENCOUNTER — RESULTS FOLLOW-UP (OUTPATIENT)
Dept: PULMONOLOGY | Age: 70
End: 2025-06-29

## 2025-07-01 ENCOUNTER — OFFICE VISIT (OUTPATIENT)
Dept: INTERNAL MEDICINE CLINIC | Age: 70
End: 2025-07-01

## 2025-07-01 VITALS
BODY MASS INDEX: 29.13 KG/M2 | HEART RATE: 62 BPM | WEIGHT: 203 LBS | DIASTOLIC BLOOD PRESSURE: 84 MMHG | SYSTOLIC BLOOD PRESSURE: 124 MMHG | OXYGEN SATURATION: 97 %

## 2025-07-01 DIAGNOSIS — I10 PRIMARY HYPERTENSION: ICD-10-CM

## 2025-07-01 DIAGNOSIS — M10.00 IDIOPATHIC GOUT, UNSPECIFIED CHRONICITY, UNSPECIFIED SITE: ICD-10-CM

## 2025-07-01 DIAGNOSIS — E55.9 VITAMIN D DEFICIENCY: ICD-10-CM

## 2025-07-01 DIAGNOSIS — I48.91 ATRIAL FIBRILLATION, UNSPECIFIED TYPE (HCC): ICD-10-CM

## 2025-07-01 DIAGNOSIS — R73.03 PREDIABETES: ICD-10-CM

## 2025-07-01 DIAGNOSIS — Z00.00 WELCOME TO MEDICARE PREVENTIVE VISIT: ICD-10-CM

## 2025-07-01 DIAGNOSIS — I73.9 PVD (PERIPHERAL VASCULAR DISEASE): ICD-10-CM

## 2025-07-01 DIAGNOSIS — E03.9 ACQUIRED HYPOTHYROIDISM: ICD-10-CM

## 2025-07-01 DIAGNOSIS — E78.5 HYPERLIPIDEMIA, UNSPECIFIED HYPERLIPIDEMIA TYPE: Primary | ICD-10-CM

## 2025-07-01 PROBLEM — R00.1 BRADYCARDIA: Status: RESOLVED | Noted: 2022-09-13 | Resolved: 2025-07-01

## 2025-07-01 PROBLEM — K55.069 SUPERIOR MESENTERIC ARTERY THROMBOSIS: Status: RESOLVED | Noted: 2023-07-21 | Resolved: 2025-07-01

## 2025-07-01 PROBLEM — Z86.718 H/O THROMBOSIS: Status: RESOLVED | Noted: 2022-01-03 | Resolved: 2025-07-01

## 2025-07-01 RX ORDER — NIFEDIPINE 60 MG/1
60 TABLET, EXTENDED RELEASE ORAL DAILY
Qty: 90 TABLET | Refills: 1 | Status: SHIPPED | OUTPATIENT
Start: 2025-07-01

## 2025-07-01 RX ORDER — LEVOTHYROXINE SODIUM 50 UG/1
50 TABLET ORAL DAILY
Qty: 90 TABLET | Refills: 1 | Status: SHIPPED | OUTPATIENT
Start: 2025-07-01

## 2025-07-01 RX ORDER — OLMESARTAN MEDOXOMIL 40 MG/1
40 TABLET ORAL DAILY
Qty: 90 TABLET | Refills: 3 | Status: SHIPPED | OUTPATIENT
Start: 2025-07-01

## 2025-07-01 RX ORDER — LOVASTATIN 10 MG/1
TABLET ORAL
Qty: 90 TABLET | Refills: 1 | Status: SHIPPED | OUTPATIENT
Start: 2025-07-01

## 2025-07-01 RX ORDER — ALBUTEROL SULFATE 90 UG/1
2 INHALANT RESPIRATORY (INHALATION) EVERY 6 HOURS PRN
Qty: 1 EACH | Refills: 5 | Status: CANCELLED | OUTPATIENT
Start: 2025-07-01

## 2025-07-01 ASSESSMENT — PATIENT HEALTH QUESTIONNAIRE - PHQ9
SUM OF ALL RESPONSES TO PHQ QUESTIONS 1-9: 0
1. LITTLE INTEREST OR PLEASURE IN DOING THINGS: NOT AT ALL
SUM OF ALL RESPONSES TO PHQ QUESTIONS 1-9: 0
2. FEELING DOWN, DEPRESSED OR HOPELESS: NOT AT ALL

## 2025-07-01 ASSESSMENT — LIFESTYLE VARIABLES
HOW MANY STANDARD DRINKS CONTAINING ALCOHOL DO YOU HAVE ON A TYPICAL DAY: 1 OR 2
HOW OFTEN DO YOU HAVE A DRINK CONTAINING ALCOHOL: MONTHLY OR LESS

## 2025-07-01 ASSESSMENT — VISUAL ACUITY
OD_CC: 2040
OS_CC: 20/40

## 2025-07-01 NOTE — PROGRESS NOTES
Ramona Gibbs (:  1955) is a 69 y.o. male,Established patient, here for evaluation of the following chief complaint(s):  New Patient, Discuss Labs (Through [facility]), Medication Check (Would like to change to Lovastatin instead of Atorvastatin /He is Taking Total beets which has the Co-Q10 inside of it.), and Medicare AWV      Assessment & Plan   ASSESSMENT/PLAN:  1. Hyperlipidemia, unspecified hyperlipidemia type  -     lovastatin (MEVACOR) 10 MG tablet; TAKE 1 TABLET NIGHTLY, Disp-90 tablet, R-1Normal  -     Lipid Panel; Future  2. Atrial fibrillation, unspecified type (HCC)  -     apixaban (ELIQUIS) 5 MG TABS tablet; Take 1 tablet by mouth 2 times daily, Disp-180 tablet, R-0Normal  3. Acquired hypothyroidism  -     levothyroxine (SYNTHROID) 50 MCG tablet; Take 1 tablet by mouth Daily, Disp-90 tablet, R-1Normal  -     TSH; Future  4. Primary hypertension  -     olmesartan (BENICAR) 40 MG tablet; Take 1 tablet by mouth daily, Disp-90 tablet, R-3Normal  -     NIFEdipine (PROCARDIA XL) 60 MG extended release tablet; Take 1 tablet by mouth daily, Disp-90 tablet, R-1Normal  -     CBC with Auto Differential; Future  5. PVD (peripheral vascular disease)  -     Comprehensive Metabolic Panel; Future  6. Vitamin D deficiency  -     Vitamin D 25 Hydroxy; Future  7. Idiopathic gout, unspecified chronicity, unspecified site  -     Uric Acid; Future  8. Prediabetes  -     Hemoglobin A1C; Future  9. Welcome to Medicare preventive visit    Assessment & Plan  1. Atrial fibrillation.  - He will continue taking Eliquis. A refill will be sent to his pharmacy.    2. Bradycardia.  - His heart rate runs around 50, but it was 62 today. No symptoms of dizziness, lightheadedness, or fainting were reported. No treatment is required at this time.    3. Chronic kidney disease stage 2.  - His kidney function is stable. No immediate changes are needed. Kidney function will be checked during the next visit.    4. Chronic

## 2025-07-01 NOTE — PATIENT INSTRUCTIONS
cholesterol. If you think you may have a problem with alcohol or drug use, talk to your doctor.   Medicines    Take your medicines exactly as prescribed. Call your doctor if you think you are having a problem with your medicine.     If your doctor recommends aspirin, take the amount directed each day. Make sure you take aspirin and not another kind of pain reliever, such as acetaminophen (Tylenol).   When should you call for help?   Call 911 if you have symptoms of a heart attack. These may include:    Chest pain or pressure, or a strange feeling in the chest.     Sweating.     Shortness of breath.     Pain, pressure, or a strange feeling in the back, neck, jaw, or upper belly or in one or both shoulders or arms.     Lightheadedness or sudden weakness.     A fast or irregular heartbeat.   After you call 911, the  may tell you to chew 1 adult-strength or 2 to 4 low-dose aspirin. Wait for an ambulance. Do not try to drive yourself.  Watch closely for changes in your health, and be sure to contact your doctor if you have any problems.  Where can you learn more?  Go to https://www.[employer].net/patientEd and enter F075 to learn more about \"A Healthy Heart: Care Instructions.\"  Current as of: July 31, 2024  Content Version: 14.5  © 7429-6304 [employer].   Care instructions adapted under license by [employer]. If you have questions about a medical condition or this instruction, always ask your healthcare professional. [email or web address], disclaims any warranty or liability for your use of this information.    Personalized Preventive Plan for Ramona Gibbs - 7/1/2025  Medicare offers a range of preventive health benefits. Some of the tests and screenings are paid in full while other may be subject to a deductible, co-insurance, and/or copay.  Some of these benefits include a comprehensive review of your medical history including lifestyle, illnesses that may run in your family, and various

## 2025-07-15 ENCOUNTER — OFFICE VISIT (OUTPATIENT)
Dept: PULMONOLOGY | Age: 70
End: 2025-07-15

## 2025-07-15 VITALS
WEIGHT: 208.6 LBS | HEIGHT: 70 IN | OXYGEN SATURATION: 97 % | SYSTOLIC BLOOD PRESSURE: 120 MMHG | DIASTOLIC BLOOD PRESSURE: 72 MMHG | BODY MASS INDEX: 29.86 KG/M2 | HEART RATE: 62 BPM

## 2025-07-15 DIAGNOSIS — J44.9 COPD, MODERATE (HCC): Primary | ICD-10-CM

## 2025-07-15 ASSESSMENT — ENCOUNTER SYMPTOMS
ABDOMINAL PAIN: 0
DIARRHEA: 0
VOICE CHANGE: 0
VOMITING: 0
STRIDOR: 0
CHEST TIGHTNESS: 0
SORE THROAT: 0
COLOR CHANGE: 0
SINUS PRESSURE: 0
WHEEZING: 0
SHORTNESS OF BREATH: 1
COUGH: 1
ABDOMINAL DISTENTION: 0
CONSTIPATION: 0
APNEA: 0
BACK PAIN: 0
BLOOD IN STOOL: 0
RHINORRHEA: 0
CHOKING: 0

## 2025-07-15 NOTE — PROGRESS NOTES
Ramona Gibbs    YOB: 1955     Date of Service:  7/15/2025     Chief Complaint   Patient presents with    Follow-up       HPI patient accompanied by wife to office today.  Overall he has been doing well, no significant symptoms of note.  Dyspnea/cough at baseline.  No recent COPD exacerbations.    Allergies   Allergen Reactions    Diovan [Valsartan] Itching    Ezetimibe-Simvastatin      Can't remember reaction    Ezetimibe-Simvastatin     Clarithromycin Rash     itching       Outpatient Medications Marked as Taking for the 7/15/25 encounter (Office Visit) with Jus Orosco MD   Medication Sig Dispense Refill    apixaban (ELIQUIS) 5 MG TABS tablet Take 1 tablet by mouth 2 times daily 180 tablet 0    olmesartan (BENICAR) 40 MG tablet Take 1 tablet by mouth daily 90 tablet 3    levothyroxine (SYNTHROID) 50 MCG tablet Take 1 tablet by mouth Daily 90 tablet 1    lovastatin (MEVACOR) 10 MG tablet TAKE 1 TABLET NIGHTLY 90 tablet 1    NIFEdipine (PROCARDIA XL) 60 MG extended release tablet Take 1 tablet by mouth daily 90 tablet 1    NIFEdipine (PROCARDIA XL) 90 MG extended release tablet Take 1 tablet by mouth daily (Patient taking differently: Take 60 mg by mouth daily) 90 tablet 3    Cholecalciferol (VITAMIN D3) 1.25 MG (33212 UT) CAPS Take 1 capsule by mouth once a week      BEVESPI AEROSPHERE 9-4.8 MCG/ACT AERO INHALE 2 PUFFS BY MOUTH 2 TIMES A DAY 32.1 g 3    fluticasone (CUTIVATE) 0.05 % cream Apply topically 2 times daily as needed. 60 g 3    acetaminophen (TYLENOL) 500 MG tablet Take 2 tablets by mouth 3 times daily      Coenzyme Q10 (CO Q 10 PO) Take by mouth      albuterol sulfate HFA (PROAIR HFA) 108 (90 Base) MCG/ACT inhaler Inhale 2 puffs into the lungs every 6 hours as needed for Wheezing 1 Inhaler 5    azelastine (ASTELIN) 0.1 % nasal spray 1 spray by Nasal route as needed Use in each nostril as directed 1 Bottle     fluticasone (FLONASE) 50 MCG/ACT nasal spray 2 sprays by

## 2025-08-20 ENCOUNTER — HOSPITAL ENCOUNTER (OUTPATIENT)
Dept: CT IMAGING | Age: 70
Discharge: HOME | End: 2025-08-20
Attending: SURGERY
Payer: MEDICARE

## 2025-08-20 DIAGNOSIS — K55.069 SUPERIOR MESENTERIC ARTERY THROMBOSIS: ICD-10-CM

## 2025-08-20 LAB
CREAT SERPL-MCNC: 1.3 MG/DL (ref 0.8–1.3)
GFR SERPLBLD CREATININE-BSD FMLA CKD-EPI: 60 ML/MIN/{1.73_M2}

## 2025-08-20 PROCEDURE — 82565 ASSAY OF CREATININE: CPT

## 2025-08-20 PROCEDURE — 36415 COLL VENOUS BLD VENIPUNCTURE: CPT

## 2025-08-20 PROCEDURE — 6360000004 HC RX CONTRAST MEDICATION: Performed by: SURGERY

## 2025-08-20 PROCEDURE — 74174 CTA ABD&PLVS W/CONTRAST: CPT

## 2025-08-20 RX ORDER — IOPAMIDOL 755 MG/ML
75 INJECTION, SOLUTION INTRAVASCULAR
Status: COMPLETED | OUTPATIENT
Start: 2025-08-20 | End: 2025-08-20

## 2025-08-20 RX ADMIN — IOPAMIDOL 75 ML: 755 INJECTION, SOLUTION INTRAVENOUS at 07:17

## 2025-08-26 ENCOUNTER — TELEPHONE (OUTPATIENT)
Dept: VASCULAR SURGERY | Age: 70
End: 2025-08-26

## (undated) DEVICE — GOWN SIRUS NONREIN XL W/TWL: Brand: MEDLINE INDUSTRIES, INC.

## (undated) DEVICE — ARM SLING: Brand: DEROYAL

## (undated) DEVICE — SUTURE VCRL + SZ 4-0 L18IN ABSRB UD L19MM PS-2 3/8 CIR PRIM VCP496H

## (undated) DEVICE — T-MAX DISPOSABLE FACE MASK 8 PER BOX

## (undated) DEVICE — PAD,ABDOMINAL,8"X10",ST,LF: Brand: MEDLINE

## (undated) DEVICE — SUPER TURBOVAC 90 INTEGRATED CABLE WAND ICW: Brand: COBLATION

## (undated) DEVICE — SYRINGE MED 5ML STD CLR PLAS LUERLOCK TIP N CTRL DISP

## (undated) DEVICE — 3M™ STERI-DRAPE™ U-DRAPE 1067 1067 5/BX 4BX/CS/CTN&#X20;: Brand: STERI-DRAPE™

## (undated) DEVICE — ADHESIVE SKIN CLSR 0.7ML TOP DERMBND ADV

## (undated) DEVICE — NEEDLE SUT PASS FOR ROT CUF LABRAL REP SUREFIRE SCORPION

## (undated) DEVICE — NEEDLE SUT PASS FOR ARTHSCP SCORPION

## (undated) DEVICE — 4.5 MM FULL RADIUS ELITE STRAIGHT                                    DISPOSABLE BLADES, MAROON,PACKAGED 6                                    PER BOX, STERILE

## (undated) DEVICE — SUTURE SUTTAPE L40IN DIA1.3MM NONABSORBABLE WHT BLU L26.5MM AR7500

## (undated) DEVICE — DYONICS 25 DAY TUBE SET MUST BE                                    USED WITH 7211008, 3 PER BOX

## (undated) DEVICE — SYRINGE, LUER LOCK, 10ML: Brand: MEDLINE

## (undated) DEVICE — TOWEL,OR,DSP,ST,BLUE,STD,6/PK,12PK/CS: Brand: MEDLINE

## (undated) DEVICE — MASTISOL ADHESIVE LIQ 2/3ML

## (undated) DEVICE — DEVON TUBE HOLDER REMOVABLE TOUCH FASTEN STRAP: Brand: DEVON

## (undated) DEVICE — 3M™ TEGADERM™ TRANSPARENT FILM DRESSING FRAME STYLE, 1626W, 4 IN X 4-3/4 IN (10 CM X 12 CM), 50/CT 4CT/CASE: Brand: 3M™ TEGADERM™

## (undated) DEVICE — HYPODERMIC SAFETY NEEDLE: Brand: MAGELLAN

## (undated) DEVICE — SOLUTION IRRIG 3000ML 0.9% SOD CHL USP UROMATIC PLAS CONT

## (undated) DEVICE — MAJOR SET UP PK

## (undated) DEVICE — GAUZE,SPONGE,4"X4",8PLY,STRL,LF,10/TRAY: Brand: MEDLINE

## (undated) DEVICE — PACK PROCEDURE SURG SHLDR MFFOP CUST

## (undated) DEVICE — LOTION PREP REMV 5OZ IODO CLR TINC OF BENZ DURAPREP

## (undated) DEVICE — NEEDLE SUT KNEE LO PROF SCORPION

## (undated) DEVICE — SLING ORTH COMFORTABLE LG 13-15 IN HND STRP HK ULTRASLING II

## (undated) DEVICE — DYONICS 25 PATIENT TUBE SET MUST                                    BE USED WITH 7211007, 12 PER BOX

## (undated) DEVICE — STRIP,CLOSURE,WOUND,MEDI-STRIP,1/2X4: Brand: MEDLINE

## (undated) DEVICE — COVER LT HNDL BLU PLAS

## (undated) DEVICE — 5.5 MM ELITE ACROMIOBLASTER                                    STRAIGHT DISPOSABLE BURRS, BRICK                                    RED, 10000 MAXIMUM RPM, PACKAGED 6                                    PER BOX, STERILE

## (undated) DEVICE — 4.0 MM ELITE ABRADER STRAIGHT                                    DISPOSABLE BURRS, AQUA, 10000                                    MAXIMUM RPM, PACKAGED 6 PER BOX, STERILE

## (undated) DEVICE — SHOULDER STABILIZATION KIT,                                    DISPOSABLE 12 PER BOX

## (undated) DEVICE — NEEDLE SPNL L3.5IN PNK HUB S STL REG WALL FIT STYL W/ QNCKE

## (undated) DEVICE — APPLICATOR PREP 26ML 0.7% IOD POVACRYLEX 74% ISO ALC ST

## (undated) DEVICE — CANNULA ARTHSCP L7CM ID8.25MM TRNSLUC THRD FLX W/ NO SQUIRT

## (undated) DEVICE — CURITY NON-ADHERENT STRIPS: Brand: CURITY

## (undated) DEVICE — TUBING, SUCTION, 1/4" X 10', STRAIGHT: Brand: MEDLINE

## (undated) DEVICE — FILTER NEEDLE: Brand: MONOJECT

## (undated) DEVICE — SUTURE VCRL SZ 3-0 L18IN ABSRB UD PS-2 L19MM 1/2 CIR J497G

## (undated) DEVICE — KIT INSTR W/ 2.4MM GUIDEPIN SUT PASS WIRE NO2 FIBERWIRE

## (undated) DEVICE — SUTURE MCRYL + SZ 4-0 L18IN ABSRB UD L19MM PS-2 3/8 CIR MCP496G

## (undated) DEVICE — GLOVE 8 LTX ST TRIFLEX POWDER LK

## (undated) DEVICE — ELECTRODE PT RET AD L9FT HI MOIST COND ADH HYDRGEL CORDED

## (undated) DEVICE — 3M™ STERI-STRIP™ REINFORCED ADHESIVE SKIN CLOSURES, R1547, 1/2 IN X 4 IN (12 MM X 100 MM), 6 STRIPS/ENVELOPE: Brand: 3M™ STERI-STRIP™

## (undated) DEVICE — Device

## (undated) DEVICE — MAT FLR W28XL40IN STD GRN 60% RECYCL MAT LO ABSRB SGL LAYR

## (undated) DEVICE — MERCY FAIRFIELD TURNOVER KIT: Brand: MEDLINE INDUSTRIES, INC.

## (undated) DEVICE — 3M™ TEGADERM™ TRANSPARENT FILM DRESSING FRAME STYLE, 1628, 6 IN X 8 IN (15 CM X 20 CM), 10/CT 8CT/CASE: Brand: 3M™ TEGADERM™

## (undated) DEVICE — DRAPE,SHOULDER,BEACH CHAIR,STERILE: Brand: MEDLINE